# Patient Record
Sex: MALE | Race: WHITE | Employment: OTHER | URBAN - METROPOLITAN AREA
[De-identification: names, ages, dates, MRNs, and addresses within clinical notes are randomized per-mention and may not be internally consistent; named-entity substitution may affect disease eponyms.]

---

## 2017-01-11 DIAGNOSIS — E78.00 PURE HYPERCHOLESTEROLEMIA: ICD-10-CM

## 2017-01-11 RX ORDER — ATORVASTATIN CALCIUM 20 MG/1
20 TABLET, FILM COATED ORAL DAILY
Qty: 90 TABLET | Refills: 0 | Status: SHIPPED | OUTPATIENT
Start: 2017-01-11 | End: 2017-05-13 | Stop reason: SDUPTHER

## 2017-02-03 RX ORDER — NIZATIDINE 150 MG/1
150 CAPSULE ORAL NIGHTLY
Qty: 90 CAPSULE | Refills: 1 | Status: SHIPPED | OUTPATIENT
Start: 2017-02-03 | End: 2017-10-26 | Stop reason: SDUPTHER

## 2017-04-06 RX ORDER — CLOPIDOGREL BISULFATE 75 MG/1
75 TABLET ORAL DAILY
Qty: 90 TABLET | Refills: 1 | Status: SHIPPED | OUTPATIENT
Start: 2017-04-06 | End: 2017-11-28 | Stop reason: SDUPTHER

## 2017-05-13 DIAGNOSIS — E78.00 PURE HYPERCHOLESTEROLEMIA: ICD-10-CM

## 2017-05-15 RX ORDER — ATORVASTATIN CALCIUM 20 MG/1
20 TABLET, FILM COATED ORAL DAILY
Qty: 90 TABLET | Refills: 0 | Status: SHIPPED | OUTPATIENT
Start: 2017-05-15 | End: 2017-08-22 | Stop reason: SDUPTHER

## 2017-06-27 DIAGNOSIS — I10 UNSPECIFIED ESSENTIAL HYPERTENSION: ICD-10-CM

## 2017-06-27 RX ORDER — METOPROLOL SUCCINATE 50 MG/1
TABLET, EXTENDED RELEASE ORAL
Qty: 90 TABLET | Refills: 0 | Status: SHIPPED | OUTPATIENT
Start: 2017-06-27 | End: 2018-09-25 | Stop reason: SDUPTHER

## 2017-06-28 DIAGNOSIS — I10 UNSPECIFIED ESSENTIAL HYPERTENSION: ICD-10-CM

## 2017-06-28 RX ORDER — LISINOPRIL 10 MG/1
10 TABLET ORAL DAILY
Qty: 90 TABLET | Refills: 0 | Status: SHIPPED | OUTPATIENT
Start: 2017-06-28 | End: 2017-12-20 | Stop reason: SDUPTHER

## 2017-08-22 DIAGNOSIS — E78.00 PURE HYPERCHOLESTEROLEMIA: ICD-10-CM

## 2017-08-23 RX ORDER — ATORVASTATIN CALCIUM 20 MG/1
TABLET, FILM COATED ORAL
Qty: 90 TABLET | Refills: 0 | Status: SHIPPED | OUTPATIENT
Start: 2017-08-23 | End: 2018-01-10 | Stop reason: SDUPTHER

## 2017-09-26 ENCOUNTER — TELEPHONE (OUTPATIENT)
Dept: FAMILY MEDICINE CLINIC | Age: 67
End: 2017-09-26

## 2017-09-26 ENCOUNTER — OFFICE VISIT (OUTPATIENT)
Dept: FAMILY MEDICINE CLINIC | Age: 67
End: 2017-09-26

## 2017-09-26 VITALS
TEMPERATURE: 97.8 F | SYSTOLIC BLOOD PRESSURE: 125 MMHG | RESPIRATION RATE: 20 BRPM | OXYGEN SATURATION: 97 % | BODY MASS INDEX: 31.77 KG/M2 | HEIGHT: 68 IN | WEIGHT: 209.6 LBS | DIASTOLIC BLOOD PRESSURE: 66 MMHG | HEART RATE: 60 BPM

## 2017-09-26 DIAGNOSIS — M50.30 DEGENERATION OF CERVICAL INTERVERTEBRAL DISC: ICD-10-CM

## 2017-09-26 DIAGNOSIS — E78.00 PURE HYPERCHOLESTEROLEMIA: ICD-10-CM

## 2017-09-26 DIAGNOSIS — Z00.00 ROUTINE GENERAL MEDICAL EXAMINATION AT A HEALTH CARE FACILITY: Primary | ICD-10-CM

## 2017-09-26 DIAGNOSIS — K21.9 GASTROESOPHAGEAL REFLUX DISEASE WITHOUT ESOPHAGITIS: ICD-10-CM

## 2017-09-26 DIAGNOSIS — Z23 NEED FOR PNEUMOCOCCAL VACCINATION: ICD-10-CM

## 2017-09-26 DIAGNOSIS — R73.03 PREDIABETES: ICD-10-CM

## 2017-09-26 DIAGNOSIS — R97.20 ELEVATED PSA: ICD-10-CM

## 2017-09-26 DIAGNOSIS — S01.01XA SCALP LACERATION, INITIAL ENCOUNTER: ICD-10-CM

## 2017-09-26 DIAGNOSIS — I25.10 CORONARY ARTERY DISEASE INVOLVING NATIVE CORONARY ARTERY OF NATIVE HEART WITHOUT ANGINA PECTORIS: ICD-10-CM

## 2017-09-26 DIAGNOSIS — M48.02 SPINAL STENOSIS IN CERVICAL REGION: ICD-10-CM

## 2017-09-26 LAB
A/G RATIO: 1.9 (ref 1.1–2.2)
ALBUMIN SERPL-MCNC: 4.7 G/DL (ref 3.4–5)
ALP BLD-CCNC: 81 U/L (ref 40–129)
ALT SERPL-CCNC: 72 U/L (ref 10–40)
ANION GAP SERPL CALCULATED.3IONS-SCNC: 13 MMOL/L (ref 3–16)
AST SERPL-CCNC: 52 U/L (ref 15–37)
BILIRUB SERPL-MCNC: 1.1 MG/DL (ref 0–1)
BUN BLDV-MCNC: 16 MG/DL (ref 7–20)
CALCIUM SERPL-MCNC: 9.9 MG/DL (ref 8.3–10.6)
CHLORIDE BLD-SCNC: 98 MMOL/L (ref 99–110)
CHOLESTEROL, TOTAL: 202 MG/DL (ref 0–199)
CO2: 28 MMOL/L (ref 21–32)
CREAT SERPL-MCNC: 1 MG/DL (ref 0.8–1.3)
GFR AFRICAN AMERICAN: >60
GFR NON-AFRICAN AMERICAN: >60
GLOBULIN: 2.5 G/DL
GLUCOSE BLD-MCNC: 97 MG/DL (ref 70–99)
HDLC SERPL-MCNC: 42 MG/DL (ref 40–60)
LDL CHOLESTEROL CALCULATED: 135 MG/DL
POTASSIUM SERPL-SCNC: 4.6 MMOL/L (ref 3.5–5.1)
PROSTATE SPECIFIC ANTIGEN: 5.93 NG/ML (ref 0–4)
SODIUM BLD-SCNC: 139 MMOL/L (ref 136–145)
TOTAL PROTEIN: 7.2 G/DL (ref 6.4–8.2)
TRIGL SERPL-MCNC: 127 MG/DL (ref 0–150)
VLDLC SERPL CALC-MCNC: 25 MG/DL

## 2017-09-26 PROCEDURE — G0009 ADMIN PNEUMOCOCCAL VACCINE: HCPCS | Performed by: FAMILY MEDICINE

## 2017-09-26 PROCEDURE — 90670 PCV13 VACCINE IM: CPT | Performed by: FAMILY MEDICINE

## 2017-09-26 PROCEDURE — G0439 PPPS, SUBSEQ VISIT: HCPCS | Performed by: FAMILY MEDICINE

## 2017-09-26 PROCEDURE — 90714 TD VACC NO PRESV 7 YRS+ IM: CPT | Performed by: FAMILY MEDICINE

## 2017-09-26 PROCEDURE — 90471 IMMUNIZATION ADMIN: CPT | Performed by: FAMILY MEDICINE

## 2017-09-26 RX ORDER — SILDENAFIL CITRATE 20 MG/1
TABLET ORAL
Qty: 90 TABLET | Refills: 1 | Status: SHIPPED | OUTPATIENT
Start: 2017-09-26

## 2017-09-27 LAB
ESTIMATED AVERAGE GLUCOSE: 122.6 MG/DL
HBA1C MFR BLD: 5.9 %

## 2017-10-13 ENCOUNTER — TELEPHONE (OUTPATIENT)
Dept: FAMILY MEDICINE CLINIC | Age: 67
End: 2017-10-13

## 2017-10-13 NOTE — TELEPHONE ENCOUNTER
A  scanned  request for a  Prior Authorization for medication  Sildenafil  is attached to this encounter. Please initiate  this request with the patients insurance company.  Thank  You

## 2017-10-13 NOTE — TELEPHONE ENCOUNTER
PA submitted in completion via CoverMyMeds. Awaiting Payor response via office fax. Thank you! CoverMyMeds PA [place]: Maida Jensen is reviewing your PA request. Typically an electronic response will be received within 72 hours. To check for an update later, open this request from your dashboard.

## 2017-10-26 RX ORDER — NIZATIDINE 150 MG/1
CAPSULE ORAL
Qty: 90 CAPSULE | Refills: 1 | Status: SHIPPED | OUTPATIENT
Start: 2017-10-26 | End: 2017-11-08 | Stop reason: SDUPTHER

## 2017-11-08 RX ORDER — NIZATIDINE 150 MG/1
CAPSULE ORAL
Qty: 90 CAPSULE | Refills: 1 | Status: SHIPPED | OUTPATIENT
Start: 2017-11-08 | End: 2018-12-04 | Stop reason: ALTCHOICE

## 2017-11-08 NOTE — TELEPHONE ENCOUNTER
From: Yvette Mata  Sent: 11/7/2017 1:14 PM EST  Subject: Medication Renewal Request    Yvette Mata would like a refill of the following medications:  nizatidine (AXID) 150 MG capsule Bella Domingo MD]    Preferred pharmacy: [facility] , [address] [phone] - F [phone]    Comment:

## 2017-11-29 RX ORDER — CLOPIDOGREL BISULFATE 75 MG/1
TABLET ORAL
Qty: 90 TABLET | Refills: 1 | Status: SHIPPED | OUTPATIENT
Start: 2017-11-29 | End: 2018-06-26 | Stop reason: SDUPTHER

## 2017-12-01 ENCOUNTER — OFFICE VISIT (OUTPATIENT)
Dept: FAMILY MEDICINE CLINIC | Age: 67
End: 2017-12-01

## 2017-12-01 VITALS
OXYGEN SATURATION: 98 % | WEIGHT: 214 LBS | BODY MASS INDEX: 32.54 KG/M2 | TEMPERATURE: 98.2 F | DIASTOLIC BLOOD PRESSURE: 82 MMHG | HEART RATE: 61 BPM | SYSTOLIC BLOOD PRESSURE: 138 MMHG

## 2017-12-01 DIAGNOSIS — H65.01 RIGHT ACUTE SEROUS OTITIS MEDIA, RECURRENCE NOT SPECIFIED: ICD-10-CM

## 2017-12-01 DIAGNOSIS — I25.10 CORONARY ARTERY DISEASE INVOLVING NATIVE CORONARY ARTERY OF NATIVE HEART WITHOUT ANGINA PECTORIS: ICD-10-CM

## 2017-12-01 DIAGNOSIS — R97.20 ELEVATED PSA: ICD-10-CM

## 2017-12-01 DIAGNOSIS — J01.10 ACUTE FRONTAL SINUSITIS, RECURRENCE NOT SPECIFIED: Primary | ICD-10-CM

## 2017-12-01 PROCEDURE — 3017F COLORECTAL CA SCREEN DOC REV: CPT | Performed by: FAMILY MEDICINE

## 2017-12-01 PROCEDURE — G8598 ASA/ANTIPLAT THER USED: HCPCS | Performed by: FAMILY MEDICINE

## 2017-12-01 PROCEDURE — 99214 OFFICE O/P EST MOD 30 MIN: CPT | Performed by: FAMILY MEDICINE

## 2017-12-01 PROCEDURE — 1123F ACP DISCUSS/DSCN MKR DOCD: CPT | Performed by: FAMILY MEDICINE

## 2017-12-01 PROCEDURE — G8427 DOCREV CUR MEDS BY ELIG CLIN: HCPCS | Performed by: FAMILY MEDICINE

## 2017-12-01 PROCEDURE — 4040F PNEUMOC VAC/ADMIN/RCVD: CPT | Performed by: FAMILY MEDICINE

## 2017-12-01 PROCEDURE — G8484 FLU IMMUNIZE NO ADMIN: HCPCS | Performed by: FAMILY MEDICINE

## 2017-12-01 PROCEDURE — 1036F TOBACCO NON-USER: CPT | Performed by: FAMILY MEDICINE

## 2017-12-01 PROCEDURE — G8417 CALC BMI ABV UP PARAM F/U: HCPCS | Performed by: FAMILY MEDICINE

## 2017-12-01 RX ORDER — CEFUROXIME AXETIL 250 MG/1
250 TABLET ORAL 2 TIMES DAILY
COMMUNITY
End: 2018-12-04 | Stop reason: ALTCHOICE

## 2017-12-01 RX ORDER — CEFUROXIME AXETIL 500 MG/1
500 TABLET ORAL 2 TIMES DAILY
Qty: 14 TABLET | Refills: 0 | Status: SHIPPED | OUTPATIENT
Start: 2017-12-01 | End: 2017-12-08

## 2017-12-01 RX ORDER — PREDNISONE 20 MG/1
TABLET ORAL
Qty: 12 TABLET | Refills: 0 | Status: SHIPPED | OUTPATIENT
Start: 2017-12-01 | End: 2017-12-11

## 2017-12-01 NOTE — PROGRESS NOTES
cough.  Diagnoses and all orders for this visit:    Acute frontal sinusitis, recurrence not specified  -     Increase CefUROXime (CEFTIN) 500 MG tablet; Take 1 tablet by mouth 2 times daily for 7 days        -     Push fluids - water. Netti pot prn.         -     PredniSONE (DELTASONE) 20 MG tablet; 60 mg qd X 2d; 40 mg qd X 2d; 20 mg qd X 2d. Right acute serous otitis media, recurrence not specified        -    Restart Flonase NS 2 sprays each nostril qhs. Elevated PSA        -    Follow up with Urologist.   Coronary artery disease involving native coronary artery of native heart without angina pectoris        -    Stable. F/u if no improvement 7d/ prn increased symptoms.

## 2017-12-08 ENCOUNTER — OFFICE VISIT (OUTPATIENT)
Dept: FAMILY MEDICINE CLINIC | Age: 67
End: 2017-12-08

## 2017-12-08 VITALS
DIASTOLIC BLOOD PRESSURE: 80 MMHG | SYSTOLIC BLOOD PRESSURE: 134 MMHG | HEIGHT: 69 IN | HEART RATE: 61 BPM | BODY MASS INDEX: 30.75 KG/M2 | WEIGHT: 207.6 LBS | TEMPERATURE: 98.5 F | OXYGEN SATURATION: 97 %

## 2017-12-08 DIAGNOSIS — J01.10 ACUTE FRONTAL SINUSITIS, RECURRENCE NOT SPECIFIED: Primary | ICD-10-CM

## 2017-12-08 PROCEDURE — 99213 OFFICE O/P EST LOW 20 MIN: CPT | Performed by: FAMILY MEDICINE

## 2017-12-08 PROCEDURE — 4040F PNEUMOC VAC/ADMIN/RCVD: CPT | Performed by: FAMILY MEDICINE

## 2017-12-08 PROCEDURE — 1036F TOBACCO NON-USER: CPT | Performed by: FAMILY MEDICINE

## 2017-12-08 PROCEDURE — 1123F ACP DISCUSS/DSCN MKR DOCD: CPT | Performed by: FAMILY MEDICINE

## 2017-12-08 PROCEDURE — G8598 ASA/ANTIPLAT THER USED: HCPCS | Performed by: FAMILY MEDICINE

## 2017-12-08 PROCEDURE — G8417 CALC BMI ABV UP PARAM F/U: HCPCS | Performed by: FAMILY MEDICINE

## 2017-12-08 PROCEDURE — G8484 FLU IMMUNIZE NO ADMIN: HCPCS | Performed by: FAMILY MEDICINE

## 2017-12-08 PROCEDURE — G8427 DOCREV CUR MEDS BY ELIG CLIN: HCPCS | Performed by: FAMILY MEDICINE

## 2017-12-08 PROCEDURE — 3017F COLORECTAL CA SCREEN DOC REV: CPT | Performed by: FAMILY MEDICINE

## 2017-12-08 RX ORDER — LEVOFLOXACIN 500 MG/1
500 TABLET, FILM COATED ORAL DAILY
Qty: 10 TABLET | Refills: 0 | Status: SHIPPED | OUTPATIENT
Start: 2017-12-08 | End: 2019-11-12 | Stop reason: SDUPTHER

## 2017-12-08 RX ORDER — PROMETHAZINE HYDROCHLORIDE AND CODEINE PHOSPHATE 6.25; 1 MG/5ML; MG/5ML
5 SYRUP ORAL 4 TIMES DAILY PRN
Qty: 240 ML | Refills: 0 | Status: SHIPPED | OUTPATIENT
Start: 2017-12-08 | End: 2017-12-15

## 2017-12-08 ASSESSMENT — PATIENT HEALTH QUESTIONNAIRE - PHQ9
SUM OF ALL RESPONSES TO PHQ QUESTIONS 1-9: 0
SUM OF ALL RESPONSES TO PHQ9 QUESTIONS 1 & 2: 0
2. FEELING DOWN, DEPRESSED OR HOPELESS: 0
1. LITTLE INTEREST OR PLEASURE IN DOING THINGS: 0

## 2017-12-08 NOTE — PROGRESS NOTES
Patient is here for nasal congestion and post nasal drip X 2 -3 weeks. Nasal discharge is yellow and blood tinged. Cough is productive of yellow sputum and blood tinged. Sleeping okay with cough medication , which he is taking every 4-6 hours. No shortness of breath . Some wheezing. No fever . No fever reducer taken. No headaches. Right ear pain / pressure. Muffled hearing. He is off Prednisone and Ceftin as of yesterday. ROS: All other systems were reviewed and are negative . Patient's allergies and medications were reviewed. Patient's past medical, surgical, social , and family history were reviewed. OBJECTIVE:  /80   Pulse 61   Temp 98.5 °F (36.9 °C)   Ht 5' 9\" (1.753 m)   Wt 207 lb 9.6 oz (94.2 kg)   SpO2 97%   BMI 30.66 kg/m²   General: NAD, cooperative, alert and oriented X 3. Mood / affect is good. good insight. well hydrated. HEENT: PERRLA, EOMI, TMs - clear. Nasopharynx clear. Non tender. Neck : no lymphadenopathy, supple, FROM  CV: Regular rate and rhythm , no murmurs/ rub/ gallop. No edema. Lungs : CTA bilaterally, breathing comfortably  Abdomen: positive bowel sounds, soft , non tender, non distended. No hepatosplenomegaly. No CVA tenderness. Skin: no rashes. Non tender. ASSESSMENT/  PLAN:  Nikos Ortiz was seen today for sinusitis. Diagnoses and all orders for this visit:    Acute frontal sinusitis, recurrence not specified  -     Levofloxacin (LEVAQUIN) 500 MG tablet; Take 1 tablet by mouth daily for 10 days        -     Push fluids - water. Netti pot prn.   -     Promethazine-codeine (PHENERGAN WITH CODEINE) 6.25-10 MG/5ML syrup; Take 5 mLs by mouth 4 times daily as needed for Cough . Side effects of medication discussed including sedation and addiction potential.     F/u if no improvement 7-10d/ prn increased symptoms.

## 2017-12-20 DIAGNOSIS — I10 ESSENTIAL HYPERTENSION: ICD-10-CM

## 2017-12-21 RX ORDER — LISINOPRIL 10 MG/1
10 TABLET ORAL DAILY
Qty: 90 TABLET | Refills: 0 | Status: SHIPPED | OUTPATIENT
Start: 2017-12-21 | End: 2018-01-03 | Stop reason: SDUPTHER

## 2018-01-03 DIAGNOSIS — I10 ESSENTIAL HYPERTENSION: ICD-10-CM

## 2018-01-03 RX ORDER — LISINOPRIL 10 MG/1
10 TABLET ORAL DAILY
Qty: 90 TABLET | Refills: 0 | Status: SHIPPED | OUTPATIENT
Start: 2018-01-03 | End: 2018-08-07 | Stop reason: SDUPTHER

## 2018-01-03 NOTE — TELEPHONE ENCOUNTER
From: Gris Stoner  Sent: 1/1/2018 10:39 AM EST  Subject: Medication Renewal Request    Gris Stoner would like a refill of the following medications:  lisinopril (PRINIVIL;ZESTRIL) 10 MG tablet Darby Hernandez MD]    Preferred pharmacy: [facility] [place] [address] [phone] - F [phone]    Comment:      Medication renewals requested in this message routed separately:  atorvastatin (LIPITOR) 20 MG tablet Zeferino Hughes MD]

## 2018-01-10 DIAGNOSIS — E78.00 PURE HYPERCHOLESTEROLEMIA: ICD-10-CM

## 2018-01-10 RX ORDER — ATORVASTATIN CALCIUM 20 MG/1
TABLET, FILM COATED ORAL
Qty: 90 TABLET | Refills: 0 | Status: SHIPPED | OUTPATIENT
Start: 2018-01-10 | End: 2018-04-15 | Stop reason: SDUPTHER

## 2018-01-10 NOTE — TELEPHONE ENCOUNTER
From: Brice Montes  Sent: 1/10/2018 9:13 AM EST  Subject: Medication Renewal Request    Brice Montes would like a refill of the following medications:  atorvastatin (LIPITOR) 20 MG tablet Asa Kline MD]    Preferred pharmacy: [place] [address] [facility] [address] [phone] - F [phone]    Comment:

## 2018-02-12 ENCOUNTER — TELEPHONE (OUTPATIENT)
Dept: FAMILY MEDICINE CLINIC | Age: 68
End: 2018-02-12

## 2018-02-12 RX ORDER — NIZATIDINE 150 MG/1
CAPSULE ORAL
Qty: 90 CAPSULE | Refills: 1 | Status: CANCELLED | OUTPATIENT
Start: 2018-02-12

## 2018-02-12 RX ORDER — RANITIDINE 150 MG/1
150 TABLET ORAL 2 TIMES DAILY
Qty: 60 TABLET | Refills: 3 | Status: SHIPPED | OUTPATIENT
Start: 2018-02-12 | End: 2018-06-01 | Stop reason: SDUPTHER

## 2018-02-12 NOTE — TELEPHONE ENCOUNTER
From: Donnie Villaseñor  Sent: 2/12/2018 1:20 PM EST  Subject: Medication Renewal Request    Donnie Villaseñor would like a refill of the following medications:  nizatidine (AXID) 150 MG capsule Halle Durán MD]    Preferred pharmacy: [email or web address], [place] - [address] [phone] - F [phone]    Comment:  My insurance will not cover this prescription. They say it's not necessary. [employer] is forwarding a letter of authorization. I am going out of town for 3 weeks and need a refill. Thank you.

## 2018-02-12 NOTE — TELEPHONE ENCOUNTER
Office has been notified that pt is requiring Prior Authorization for the following medication:    NIZATIDINE    Please initiate this request through CoverMyMeds, contacting the following Payor/Insurance:    MEDICARE    Please see below, or the documentation attached to this encounter for any additional information that may assist in processing PA:    --     Thank you!

## 2018-04-15 DIAGNOSIS — E78.00 PURE HYPERCHOLESTEROLEMIA: ICD-10-CM

## 2018-04-16 RX ORDER — ATORVASTATIN CALCIUM 20 MG/1
TABLET, FILM COATED ORAL
Qty: 90 TABLET | Refills: 0 | Status: SHIPPED | OUTPATIENT
Start: 2018-04-16 | End: 2018-08-29 | Stop reason: SDUPTHER

## 2018-05-11 ENCOUNTER — OFFICE VISIT (OUTPATIENT)
Dept: ORTHOPEDIC SURGERY | Age: 68
End: 2018-05-11

## 2018-05-11 VITALS
HEIGHT: 69 IN | HEART RATE: 63 BPM | DIASTOLIC BLOOD PRESSURE: 73 MMHG | BODY MASS INDEX: 30.66 KG/M2 | WEIGHT: 207 LBS | SYSTOLIC BLOOD PRESSURE: 122 MMHG

## 2018-05-11 DIAGNOSIS — M79.672 LEFT FOOT PAIN: ICD-10-CM

## 2018-05-11 DIAGNOSIS — M72.2 PLANTAR FASCIITIS, BILATERAL: ICD-10-CM

## 2018-05-11 DIAGNOSIS — M79.671 RIGHT FOOT PAIN: Primary | ICD-10-CM

## 2018-05-11 PROCEDURE — G8427 DOCREV CUR MEDS BY ELIG CLIN: HCPCS | Performed by: ORTHOPAEDIC SURGERY

## 2018-05-11 PROCEDURE — 4040F PNEUMOC VAC/ADMIN/RCVD: CPT | Performed by: ORTHOPAEDIC SURGERY

## 2018-05-11 PROCEDURE — G8417 CALC BMI ABV UP PARAM F/U: HCPCS | Performed by: ORTHOPAEDIC SURGERY

## 2018-05-11 PROCEDURE — G8598 ASA/ANTIPLAT THER USED: HCPCS | Performed by: ORTHOPAEDIC SURGERY

## 2018-05-11 PROCEDURE — 1123F ACP DISCUSS/DSCN MKR DOCD: CPT | Performed by: ORTHOPAEDIC SURGERY

## 2018-05-11 PROCEDURE — 3017F COLORECTAL CA SCREEN DOC REV: CPT | Performed by: ORTHOPAEDIC SURGERY

## 2018-05-11 PROCEDURE — 1036F TOBACCO NON-USER: CPT | Performed by: ORTHOPAEDIC SURGERY

## 2018-05-11 PROCEDURE — 99214 OFFICE O/P EST MOD 30 MIN: CPT | Performed by: ORTHOPAEDIC SURGERY

## 2018-05-15 ENCOUNTER — HOSPITAL ENCOUNTER (OUTPATIENT)
Dept: PHYSICAL THERAPY | Age: 68
Discharge: HOME | End: 2018-05-31
Admitting: ORTHOPAEDIC SURGERY

## 2018-05-22 ENCOUNTER — HOSPITAL ENCOUNTER (OUTPATIENT)
Dept: PHYSICAL THERAPY | Age: 68
Discharge: HOME | End: 2018-05-23
Admitting: ORTHOPAEDIC SURGERY

## 2018-05-24 ENCOUNTER — HOSPITAL ENCOUNTER (OUTPATIENT)
Dept: PHYSICAL THERAPY | Age: 68
Discharge: HOME | End: 2018-05-25
Admitting: ORTHOPAEDIC SURGERY

## 2018-05-29 ENCOUNTER — HOSPITAL ENCOUNTER (OUTPATIENT)
Dept: PHYSICAL THERAPY | Age: 68
Discharge: HOME | End: 2018-05-30
Admitting: ORTHOPAEDIC SURGERY

## 2018-06-01 ENCOUNTER — HOSPITAL ENCOUNTER (OUTPATIENT)
Dept: PHYSICAL THERAPY | Age: 68
Discharge: HOME | End: 2018-06-30
Attending: ORTHOPAEDIC SURGERY | Admitting: ORTHOPAEDIC SURGERY

## 2018-06-01 RX ORDER — RANITIDINE 150 MG/1
TABLET ORAL
Qty: 180 TABLET | Refills: 1 | Status: SHIPPED | OUTPATIENT
Start: 2018-06-01 | End: 2020-05-08 | Stop reason: ALTCHOICE

## 2018-06-04 ENCOUNTER — HOSPITAL ENCOUNTER (OUTPATIENT)
Dept: PHYSICAL THERAPY | Age: 68
Discharge: HOME | End: 2018-06-05
Admitting: ORTHOPAEDIC SURGERY

## 2018-06-12 ENCOUNTER — HOSPITAL ENCOUNTER (OUTPATIENT)
Dept: PHYSICAL THERAPY | Age: 68
Discharge: HOME | End: 2018-06-13
Admitting: ORTHOPAEDIC SURGERY

## 2018-06-13 LAB — PROSTATE SPECIFIC ANTIGEN: 4.47 NG/ML (ref 0–4)

## 2018-06-26 ENCOUNTER — HOSPITAL ENCOUNTER (OUTPATIENT)
Dept: PHYSICAL THERAPY | Age: 68
Discharge: HOME | End: 2018-06-27
Admitting: ORTHOPAEDIC SURGERY

## 2018-06-26 RX ORDER — CLOPIDOGREL BISULFATE 75 MG/1
TABLET ORAL
Qty: 90 TABLET | Refills: 0 | Status: SHIPPED | OUTPATIENT
Start: 2018-06-26 | End: 2018-09-23 | Stop reason: SDUPTHER

## 2018-07-01 ENCOUNTER — HOSPITAL ENCOUNTER (OUTPATIENT)
Dept: PHYSICAL THERAPY | Age: 68
Discharge: HOME | End: 2018-07-01
Attending: ORTHOPAEDIC SURGERY | Admitting: ORTHOPAEDIC SURGERY

## 2018-07-03 ENCOUNTER — HOSPITAL ENCOUNTER (OUTPATIENT)
Dept: PHYSICAL THERAPY | Age: 68
Discharge: HOME | End: 2018-07-04
Admitting: ORTHOPAEDIC SURGERY

## 2018-07-03 NOTE — FLOWSHEET NOTE
[facility] Orthopaedics and Sports Rehabilitation, [facility]     Physical Therapy Daily Treatment Note  Date:  7/3/2018    Patient Name:  Nalini Bautista    :  1950  MRN: [id number]  Restrictions/Precautions:    Medical/Treatment Diagnosis Information:  · Diagnosis: M72.2 (ICD-10-CM) - Plantar fasciitis, bilateral  · Treatment Diagnosis: B foot pain, decreased TCJ mobility, HS/GS tightness  Insurance/Certification information:  PT Insurance Information: PT BENEFITS 2018 FACILITY/ MEDICARE PRIMARY/ PAYS 80%/ 2,010 CAP FOR 2018/ [employer] SECONDARY INS/ 18 PAG/ PW COMPLETE 5-15-18 PAG  Physician Information:  Referring Practitioner: Yuriy Blair MD  Plan of care signed (Y/N):     Date of Patient follow up with Physician:  Not scheduled at this time    G-Code (if applicable):      Date G-Code Applied:  5/15/18  PT G-Codes  Functional Assessment Tool Used: LEFS  Score: 65/80=81.25% (18.75% deficit)  18 score 65/80=81.25% (18.75% deficit)  Functional Limitation: Mobility: Walking and moving around  Mobility: Walking and Moving Around Current Status (): At least 1 percent but less than 20 percent impaired, limited or restricted  Mobility: Walking and Moving Around Goal Status (): 0 percent impaired, limited or restricted    Progress Note: [x]  Yes  []  No  Next due by: Visit #10       Latex Allergy:  [x]NO      []YES  Preferred Language for Healthcare:   [x]English       []other:    Visit # Insurance Allowable   7 MEDICARE     Pain level:  0/10     SUBJECTIVE: Pt states his feet have been feeling good. He did two 1/2 mile walks and two 2 mile walks, notes some tenderness but is not having pain, no symptoms that linger into the next day. Did a 10 mile bike ride, no discomfort with feet. If he has discomfort it is mostly when barefoot, but overall his symptoms are vastly improved and discomfort is only intermittent. Pt states that he does not feel confident to run yet.  \"Every day life I'm fine. \"    OBJECTIVE:            ROM PROM AROM Comments     Left Right Left Right     Dorsiflexion     0 G  1 S 0 G  2 S     Plantarflexion     50 65     Inversion     30 45     Eversion     13 23     Knee ext     0 0     Knee flex     130 128        Strength Left Right Comments   Dorsiflexion 4+/5 4+/5     Plantarflexion 4+/5 4+/5     Inversion 4+/5 4+/5     Eversion 4+/5 4+/5     Knee Flex 4+/5 4+/5     Knee Ext 4+/5 4+/5     Seated ER 4+/5 4+/5     Seated IR 4+/5 4+/5     Hip Ext 4+/5 4+/5     Hip ABD 4+/5 4+/5        Flexibility Left Right Comments   Ely's - -     Gerard - -     Hector - -     SLR-HS 50 deg 55 deg        Reflexes/Sensation: Not assessed               []Dermatomes/Myotomes intact               []Reflexes equal and normal bilaterally              []Other:     Joint mobility:               [x]Normal, metatarsal joints                      [x]Hypo, B talocrural joint              []Hyper     Palpation: +TTP B plantarfascia      Functional Mobility/Transfers: Independent     Posture: No major deviations noted     Balance:   SLS EO: 26\" L, 60\" R   SLS EC: 5\" on L, 4\" on R      Gait: Charlotte legged. RLE in ER.                        RESTRICTIONS/PRECAUTIONS: None    Exercises/Interventions:   Exercise/Equipment Resistance/Repetitions Other comments   Soft Tissue Massage/  Mobilizations     Proximal calf  Tennis ball behind knee, flex knee, DF/PF ankle   Calf Roll with tennis ball   TCJ mobs Mini squats with toes elevated  on incline board   Plantarfascia  Tennis ball under foot             Stretching     Hamstring Supine with strap   Supine CBA With strap   Stair stretch- gastroc    Stair stretch- soleus    Great toe extension Great toe against wall        PRE's     Dorsiflexion     Plantarflexion     Inversion     Eversion     SLR     Calf Raises     Step Up     Knee Extension     Hamstring Curls     Leg Press     Doming 10x10\" B    Toe Curls 3x50 B    Rock  x2 B    Clamshells Blue loop without increased symptoms or restriction. Progressing  5. Patient will tolerate progressive return to running through the return to run program.     Progressing     Progression Towards Functional goals:  [] Patient is progressing as expected towards functional goals listed. [] Progression is slowed due to complexities listed. [] Progression has been slowed due to co-morbidities. [x] Plan just implemented, too soon to assess goals progression  [] Other:     ASSESSMENT:  Continues to be challenged by SLS. Good tolerance to addition of squats with band around feet to promote improved foot intrinsic activation. Pt educated on return to run program but at reduced intensity, 4 cycles instead of 5. Will determine PT progressions based on running tolerance, alternative interventions such as dry needling or cortisone injection. Pt requires PT follow up to address ROM, strength and functional mobility deficits. Treatment/Activity Tolerance:  [x] Patient tolerated treatment well [] Patient limited by fatique  [] Patient limited by pain  [] Patient limited by other medical complications  [] Other:     Prognosis: [x] Good [] Fair  [] Poor    Patient Requires Follow-up: [x] Yes  [] No    PLAN: See eval  [x] Continue per plan of care [] Alter current plan (see comments)  [] Plan of care initiated [] Hold pending MD visit [] Discharge    Electronically signed by: Sandra Swanson PT, DPT  Physical Therapist  [id number]  [email or web address]

## 2018-07-13 ENCOUNTER — HOSPITAL ENCOUNTER (OUTPATIENT)
Dept: PHYSICAL THERAPY | Age: 68
Discharge: HOME | End: 2018-07-14
Admitting: ORTHOPAEDIC SURGERY

## 2018-07-13 NOTE — FLOWSHEET NOTE
Balance:     Rocker Board 2x30\" DL perform A/P and M/L   BOSU     SLS   EC 3x20\"    Aeromat     Foam Roll     Plyoback     Tandem Stance 3x10 4# ball toss, B Airex   Around the world  Cones at AL, A, AM, M, PM, P        Bike     Treadmill                 Plan for next session: progress as tolerated    Patient Education:  5/15/18 Pt educated on diagnosis, prognosis and PT plan of care. Educated on [address]. Pt questions were addressed and answered. Therapeutic Exercise and NMR EXR:  [x] (34144) Provided verbal/tactile cueing for activities related to strengthening, flexibility, endurance, ROM for improvements in LE, proximal hip, and core control with self care, mobility, lifting, ambulation.  [] (39975) Provided verbal/tactile cueing for activities related to improving balance, coordination, kinesthetic sense, posture, motor skill, proprioception  to assist with LE, proximal hip, and core control in self care, mobility, lifting, ambulation and eccentric single leg control.      NMR and Therapeutic Activities:    [] (13861 or 88871) Provided verbal/tactile cueing for activities related to improving balance, coordination, kinesthetic sense, posture, motor skill, proprioception and motor activation to allow for proper function of core, proximal hip and LE with self care and ADLs  [] (94397) Gait Re-education- Provided training and instruction to the patient for proper LE, core and proximal hip recruitment and positioning and eccentric body weight control with ambulation re-education including up and down stairs     Home Exercise Program:   5/22/18 Added SLS, tandem balance, toe curls, doming   [x] (08008) Reviewed/Progressed HEP activities related to strengthening, flexibility, endurance, ROM of core, proximal hip and LE for functional self-care, mobility, lifting and ambulation/stair navigation   [] (13434)Reviewed/Progressed HEP activities related to improving balance, coordination, kinesthetic sense, posture, motor

## 2018-07-16 ENCOUNTER — HOSPITAL ENCOUNTER (OUTPATIENT)
Dept: PHYSICAL THERAPY | Age: 68
Setting detail: THERAPIES SERIES
Discharge: HOME | End: 2018-07-16
Payer: MEDICARE

## 2018-07-16 PROCEDURE — 97110 THERAPEUTIC EXERCISES: CPT | Performed by: PHYSICAL THERAPIST

## 2018-07-16 PROCEDURE — 97112 NEUROMUSCULAR REEDUCATION: CPT | Performed by: PHYSICAL THERAPIST

## 2018-07-16 PROCEDURE — 97140 MANUAL THERAPY 1/> REGIONS: CPT | Performed by: PHYSICAL THERAPIST

## 2018-07-16 NOTE — FLOWSHEET NOTE
[facility]     Physical Therapy Daily Treatment Note  Date:  2018    Patient Name:  Kenda Siemens    :  1950  MRN: [id number]  Restrictions/Precautions:    Medical/Treatment Diagnosis Information:  · Diagnosis: M72.2 (ICD-10-CM) - Plantar fasciitis, bilateral  · Treatment Diagnosis: B foot pain, decreased TCJ mobility, HS/GS tightness  Insurance/Certification information:  PT Insurance Information: PT BENEFITS 2018 FACILITY/ MEDICARE PRIMARY/ PAYS 80%/ 2,010 CAP FOR 2018/ [employer] SECONDARY INS/ 18 PAG/ PW COMPLETE 5-15-18 PAG  Physician Information:  Referring Practitioner: Wan Freire MD  Plan of care signed (Y/N):     Date of Patient follow up with Physician:  Not scheduled at this time    G-Code (if applicable):      Date G-Code Applied:  5/15/18  PT G-Codes  Functional Assessment Tool Used: LEFS  Score: 65/80=81.25% (18.75% deficit)  18 score 65/80=81.25% (18.75% deficit)  Functional Limitation: Mobility: Walking and moving around  Mobility: Walking and Moving Around Current Status (): At least 1 percent but less than 20 percent impaired, limited or restricted  Mobility: Walking and Moving Around Goal Status (): 0 percent impaired, limited or restricted    Progress Note: [x]  Yes  []  No  Next due by: Visit #10       Latex Allergy:  [x]NO      []YES  Preferred Language for Healthcare:   [x]English       []other:    Visit # Insurance Allowable   9 MEDICARE     Pain level:  0/10     SUBJECTIVE: Pt states that he is doing well with continued decreased pain. Is now performing the 2nd stage of return to run program without pain and only notes minimal aching t/o that dissipates with rest.  Reports compliance with HEP without any c/o pain or discomfort. Denies any pain after this visit. Is excited to be trying dry needling this date.      OBJECTIVE:            ROM PROM AROM Comments     Left Right Left Right   band around middle of feet             Balance:     Rocker Board 2x30\" DL perform A/P and M/L   BOSU     SLS   EC 3x20\"    Aeromat     Foam Roll     Plyoback     Tandem Stance 3x10 4# ball toss, B Airex   Around the world  Cones at AL, A, AM, M, PM, P        Bike     Treadmill                 Plan for next session: progress as tolerated    Patient Education:  5/15/18 Pt educated on diagnosis, prognosis and PT plan of care. Educated on [address]. Pt questions were addressed and answered. Therapeutic Exercise and NMR EXR:  [x] (01743) Provided verbal/tactile cueing for activities related to strengthening, flexibility, endurance, ROM for improvements in LE, proximal hip, and core control with self care, mobility, lifting, ambulation.  [] (81172) Provided verbal/tactile cueing for activities related to improving balance, coordination, kinesthetic sense, posture, motor skill, proprioception  to assist with LE, proximal hip, and core control in self care, mobility, lifting, ambulation and eccentric single leg control.      NMR and Therapeutic Activities:    [] (52213 or 06143) Provided verbal/tactile cueing for activities related to improving balance, coordination, kinesthetic sense, posture, motor skill, proprioception and motor activation to allow for proper function of core, proximal hip and LE with self care and ADLs  [] (20238) Gait Re-education- Provided training and instruction to the patient for proper LE, core and proximal hip recruitment and positioning and eccentric body weight control with ambulation re-education including up and down stairs     Home Exercise Program:   5/22/18 Added SLS, tandem balance, toe curls, doming   [x] (20965) Reviewed/Progressed HEP activities related to strengthening, flexibility, endurance, ROM of core, proximal hip and LE for functional self-care, mobility, lifting and ambulation/stair navigation   [] (62937)Reviewed/Progressed HEP activities related to improving balance,

## 2018-07-27 ENCOUNTER — APPOINTMENT (OUTPATIENT)
Dept: PHYSICAL THERAPY | Age: 68
End: 2018-07-27
Payer: MEDICARE

## 2018-08-07 DIAGNOSIS — I10 ESSENTIAL HYPERTENSION: ICD-10-CM

## 2018-08-07 RX ORDER — LISINOPRIL 10 MG/1
10 TABLET ORAL DAILY
Qty: 90 TABLET | Refills: 0 | Status: SHIPPED | OUTPATIENT
Start: 2018-08-07 | End: 2018-12-17 | Stop reason: SDUPTHER

## 2018-08-09 RX ORDER — RANITIDINE 150 MG/1
TABLET ORAL
Qty: 60 TABLET | Refills: 5 | Status: SHIPPED | OUTPATIENT
Start: 2018-08-09 | End: 2018-12-04 | Stop reason: SDUPTHER

## 2018-08-10 ENCOUNTER — HOSPITAL ENCOUNTER (OUTPATIENT)
Dept: PHYSICAL THERAPY | Age: 68
Setting detail: THERAPIES SERIES
Discharge: HOME | End: 2018-08-10
Payer: MEDICARE

## 2018-08-10 PROCEDURE — 97110 THERAPEUTIC EXERCISES: CPT | Performed by: PHYSICAL THERAPIST

## 2018-08-10 PROCEDURE — 97112 NEUROMUSCULAR REEDUCATION: CPT | Performed by: PHYSICAL THERAPIST

## 2018-08-10 PROCEDURE — 97140 MANUAL THERAPY 1/> REGIONS: CPT | Performed by: PHYSICAL THERAPIST

## 2018-08-10 NOTE — FLOWSHEET NOTE
Modalities:  None    Charges:  Timed Code Treatment Minutes: 38   Total Treatment Minutes: 70     [] EVAL  [x] WN(84450) x  1   [] IONTO  [x] NMR (61848) x  1   [] VASO  [x] Manual (75046) x  1    [] Other:  [] TA x       [] Mech Traction (14304)  [] ES(attended) (32689)      [] ES (un) (15761):     GOALS:   Patient stated goal: Return to running     Therapist goals for Patient:   Short Term Goals: To be achieved in: 2 weeks 5/29/18  1. Independent in HEP and progression per patient tolerance, in order to prevent re-injury. MET  2. Patient will have a decrease in pain to facilitate improvement in movement, function, and ADLs as indicated by Functional Deficits. MET     Long Term Goals: To be achieved in: 6 weeks 6/26/18  1. Disability index score of 10% or less for the LEFS to assist with reaching prior level of function. Progressing  2. Patient will demonstrate increased AROM to 5-10 deg or greater for B G/S and HS to allow for proper joint functioning as indicated by patients Functional Deficits. Progressing   3. Patient will demonstrate an increase in SLS tolerance, able to maintain balance for 30\" or greater with EO and 20\" or greater with EC for improved stability and control. Progressing  4. Patient will return to ADLs and IADLs and functional activities without increased symptoms or restriction. Progressing  5. Patient will tolerate progressive return to running through the return to run program.     Progressing     Progression Towards Functional goals:  [] Patient is progressing as expected towards functional goals listed. [] Progression is slowed due to complexities listed. [] Progression has been slowed due to co-morbidities. [x] Plan just implemented, too soon to assess goals progression  [] Other:     ASSESSMENT:     Pt tolerated dry needling well with minimal to no local muscle twitches appreciated. DN is no longer indicated at this time.       Pt demonstrates improved B ankle ROM, even though deficits persist pt's mobility is improved compared to at IE. Pt also demonstrates improved SL and DL balance, though is still challenged achieving 30\" with EC. Pt has been able to work independently over the last month without exacerbation of symptoms and continued progression of RTR program. As a result, pt demonstrates that he no longer requires skilled PT at this time and rather continued compliance with HEP for independent strength and ROM improvement. DN is no longer indicated at this time d/t lack of trigger points noted, further indicating positive response and lack of need for skilled PT. Pt will be DC'd from care and f/u should issues arise, pt must see MD prior to returning to PT. Pt in agreement with plan. All questions and concerns were addressed. Treatment/Activity Tolerance:  [x] Patient tolerated treatment well [] Patient limited by fatique  [] Patient limited by pain  [] Patient limited by other medical complications  [] Other:     Prognosis: [x] Good [] Fair  [] Poor    Patient Requires Follow-up: [x] Yes  [] No    PLAN: See eval  [x] Continue per plan of care [] Alter current plan (see comments)  [] Plan of care initiated [] Hold pending MD visit [] Discharge    Electronically signed by: Rosalind Camarillo PT, DPT  Physical Therapist  [id number]  [email or web address]        Ban Lomas. Janusz Brown DPT, [id number]  Physical Therapist  [email or web address]

## 2018-08-29 DIAGNOSIS — E78.00 PURE HYPERCHOLESTEROLEMIA: ICD-10-CM

## 2018-08-30 RX ORDER — ATORVASTATIN CALCIUM 20 MG/1
TABLET, FILM COATED ORAL
Qty: 90 TABLET | Refills: 0 | Status: SHIPPED | OUTPATIENT
Start: 2018-08-30 | End: 2018-12-17 | Stop reason: SDUPTHER

## 2018-09-24 RX ORDER — CLOPIDOGREL BISULFATE 75 MG/1
TABLET ORAL
Qty: 90 TABLET | Refills: 0 | Status: SHIPPED | OUTPATIENT
Start: 2018-09-24 | End: 2018-12-04 | Stop reason: SDUPTHER

## 2018-09-25 DIAGNOSIS — I25.10 CORONARY ARTERY DISEASE INVOLVING NATIVE CORONARY ARTERY OF NATIVE HEART WITHOUT ANGINA PECTORIS: Primary | ICD-10-CM

## 2018-09-25 DIAGNOSIS — I10 ESSENTIAL HYPERTENSION: ICD-10-CM

## 2018-09-26 RX ORDER — METOPROLOL SUCCINATE 50 MG/1
50 TABLET, EXTENDED RELEASE ORAL DAILY
Qty: 90 TABLET | Refills: 0 | Status: SHIPPED | OUTPATIENT
Start: 2018-09-26 | End: 2021-03-21 | Stop reason: DRUGHIGH

## 2018-09-26 RX ORDER — CLOPIDOGREL BISULFATE 75 MG/1
75 TABLET ORAL DAILY
Qty: 90 TABLET | Refills: 0 | Status: SHIPPED | OUTPATIENT
Start: 2018-09-26 | End: 2018-12-03 | Stop reason: SDUPTHER

## 2018-12-03 DIAGNOSIS — I25.10 CORONARY ARTERY DISEASE INVOLVING NATIVE CORONARY ARTERY OF NATIVE HEART WITHOUT ANGINA PECTORIS: ICD-10-CM

## 2018-12-03 RX ORDER — CLOPIDOGREL BISULFATE 75 MG/1
75 TABLET ORAL DAILY
Qty: 90 TABLET | Refills: 1 | Status: SHIPPED | OUTPATIENT
Start: 2018-12-03 | End: 2019-01-29 | Stop reason: SDUPTHER

## 2018-12-04 ENCOUNTER — OFFICE VISIT (OUTPATIENT)
Dept: FAMILY MEDICINE CLINIC | Age: 68
End: 2018-12-04
Payer: MEDICARE

## 2018-12-04 VITALS
DIASTOLIC BLOOD PRESSURE: 86 MMHG | RESPIRATION RATE: 12 BRPM | TEMPERATURE: 97 F | BODY MASS INDEX: 31.64 KG/M2 | OXYGEN SATURATION: 97 % | HEART RATE: 57 BPM | HEIGHT: 69 IN | WEIGHT: 213.6 LBS | SYSTOLIC BLOOD PRESSURE: 144 MMHG

## 2018-12-04 DIAGNOSIS — G47.33 OBSTRUCTIVE SLEEP APNEA: ICD-10-CM

## 2018-12-04 DIAGNOSIS — I25.10 CORONARY ARTERY DISEASE INVOLVING NATIVE CORONARY ARTERY OF NATIVE HEART WITHOUT ANGINA PECTORIS: ICD-10-CM

## 2018-12-04 DIAGNOSIS — Z23 NEED FOR SHINGLES VACCINE: ICD-10-CM

## 2018-12-04 DIAGNOSIS — M48.02 SPINAL STENOSIS IN CERVICAL REGION: ICD-10-CM

## 2018-12-04 DIAGNOSIS — R21 RASH: ICD-10-CM

## 2018-12-04 DIAGNOSIS — R73.03 PREDIABETES: ICD-10-CM

## 2018-12-04 DIAGNOSIS — E78.00 PURE HYPERCHOLESTEROLEMIA: ICD-10-CM

## 2018-12-04 DIAGNOSIS — M50.30 DEGENERATION OF CERVICAL INTERVERTEBRAL DISC: ICD-10-CM

## 2018-12-04 DIAGNOSIS — R97.20 ELEVATED PSA: ICD-10-CM

## 2018-12-04 DIAGNOSIS — M19.072 OSTEOARTHRITIS OF LEFT FOOT, UNSPECIFIED OSTEOARTHRITIS TYPE: ICD-10-CM

## 2018-12-04 DIAGNOSIS — Z00.00 ROUTINE GENERAL MEDICAL EXAMINATION AT A HEALTH CARE FACILITY: Primary | ICD-10-CM

## 2018-12-04 DIAGNOSIS — H91.93 HEARING DIFFICULTY OF BOTH EARS: ICD-10-CM

## 2018-12-04 DIAGNOSIS — Z23 NEEDS FLU SHOT: ICD-10-CM

## 2018-12-04 LAB
A/G RATIO: 1.9 (ref 1.1–2.2)
ALBUMIN SERPL-MCNC: 4.9 G/DL (ref 3.4–5)
ALP BLD-CCNC: 83 U/L (ref 40–129)
ALT SERPL-CCNC: 77 U/L (ref 10–40)
ANION GAP SERPL CALCULATED.3IONS-SCNC: 12 MMOL/L (ref 3–16)
AST SERPL-CCNC: 56 U/L (ref 15–37)
BILIRUB SERPL-MCNC: 0.8 MG/DL (ref 0–1)
BUN BLDV-MCNC: 19 MG/DL (ref 7–20)
CALCIUM SERPL-MCNC: 10.1 MG/DL (ref 8.3–10.6)
CHLORIDE BLD-SCNC: 101 MMOL/L (ref 99–110)
CHOLESTEROL, TOTAL: 210 MG/DL (ref 0–199)
CO2: 28 MMOL/L (ref 21–32)
CREAT SERPL-MCNC: 0.9 MG/DL (ref 0.8–1.3)
GFR AFRICAN AMERICAN: >60
GFR NON-AFRICAN AMERICAN: >60
GLOBULIN: 2.6 G/DL
GLUCOSE BLD-MCNC: 101 MG/DL (ref 70–99)
HDLC SERPL-MCNC: 42 MG/DL (ref 40–60)
LDL CHOLESTEROL CALCULATED: 147 MG/DL
POTASSIUM SERPL-SCNC: 4.7 MMOL/L (ref 3.5–5.1)
PROSTATE SPECIFIC ANTIGEN: 5.39 NG/ML (ref 0–4)
SODIUM BLD-SCNC: 141 MMOL/L (ref 136–145)
TOTAL PROTEIN: 7.5 G/DL (ref 6.4–8.2)
TRIGL SERPL-MCNC: 104 MG/DL (ref 0–150)
VLDLC SERPL CALC-MCNC: 21 MG/DL

## 2018-12-04 PROCEDURE — 90662 IIV NO PRSV INCREASED AG IM: CPT | Performed by: FAMILY MEDICINE

## 2018-12-04 PROCEDURE — 90732 PPSV23 VACC 2 YRS+ SUBQ/IM: CPT | Performed by: FAMILY MEDICINE

## 2018-12-04 PROCEDURE — 92551 PURE TONE HEARING TEST AIR: CPT | Performed by: FAMILY MEDICINE

## 2018-12-04 PROCEDURE — G0439 PPPS, SUBSEQ VISIT: HCPCS | Performed by: FAMILY MEDICINE

## 2018-12-04 PROCEDURE — G0009 ADMIN PNEUMOCOCCAL VACCINE: HCPCS | Performed by: FAMILY MEDICINE

## 2018-12-04 PROCEDURE — G8482 FLU IMMUNIZE ORDER/ADMIN: HCPCS | Performed by: FAMILY MEDICINE

## 2018-12-04 PROCEDURE — G0008 ADMIN INFLUENZA VIRUS VAC: HCPCS | Performed by: FAMILY MEDICINE

## 2018-12-04 RX ORDER — CLOTRIMAZOLE AND BETAMETHASONE DIPROPIONATE 10; .64 MG/G; MG/G
CREAM TOPICAL
Qty: 45 G | Refills: 1 | Status: SHIPPED | OUTPATIENT
Start: 2018-12-04 | End: 2020-03-31

## 2018-12-04 RX ORDER — ATORVASTATIN CALCIUM 40 MG/1
40 TABLET, FILM COATED ORAL DAILY
Qty: 90 TABLET | Refills: 1 | Status: SHIPPED | OUTPATIENT
Start: 2018-12-04 | End: 2019-06-24

## 2018-12-04 ASSESSMENT — PATIENT HEALTH QUESTIONNAIRE - PHQ9
SUM OF ALL RESPONSES TO PHQ QUESTIONS 1-9: 0
1. LITTLE INTEREST OR PLEASURE IN DOING THINGS: 0
2. FEELING DOWN, DEPRESSED OR HOPELESS: 0
SUM OF ALL RESPONSES TO PHQ QUESTIONS 1-9: 0
SUM OF ALL RESPONSES TO PHQ9 QUESTIONS 1 & 2: 0

## 2018-12-04 NOTE — PROGRESS NOTES
facility-administered medications for this visit. Allergies   Allergen Reactions    Amoxicillin Hives and Swelling       Social History   Substance Use Topics    Smoking status: Never Smoker    Smokeless tobacco: Never Used    Alcohol use 1.8 - 3.6 oz/week     3 - 6 Cans of beer per week        Family History   Problem Relation Age of Onset    Heart Disease Mother     Heart Disease Father         CAD    Diabetes Father     High Blood Pressure Father     Hypertension Father           Patient's allergies and medications were reviewed. Patient's past medical, surgical, social , and family history were reviewed. ROS:  Feeling well. No dyspnea or chest pain on exertion. No abdominal pain, change in bowel habits, black or bloody stools. No urinary tract or prostatic symptoms. No neurological complaints. See patient physical/  ROS questionnaire. OBJECTIVE:   BP (!) 144/86   Pulse 57   Temp 97 °F (36.1 °C) (Oral)   Resp 12   Ht 5' 9.25\" (1.759 m)   Wt 213 lb 9.6 oz (96.9 kg)   SpO2 97%   BMI 31.32 kg/m²   There were no vitals filed for this visit. The patient appears well, alert, oriented x 3, in no distress. HEENT : normocephalic, atraumatic, PERRLA, EOMI, tympanic membranes and nasopharynx are normal.  Neck supple. No adenopathy or thyromegaly. Upper extremities : DTRs 2+ biceps/ triceps/ brachioradialis bilateral.  FROM. Strength 5/5. Lungs are clear, good air entry, no wheezes, rhonchi or rales. Cardiovascular: regular rate  And rhythm. S1 and S2 are normal, no murmurs,rubs, and gallops. Abdomen is soft without tenderness, guarding, mass or organomegaly. Normal bowel sounds. Lower extremities : DTRs 2+ knees and ankles bilateral.  FROM. Strength 5/5. Negative straight leg-raise. No edema or erythema bilateral. normal peripheral pulses. Neuro: Cranial nerves 2-12 are normal. Deep tendon reflexes are 2+ and equal to all extremities.   No focal sensory, or motor deficit

## 2018-12-05 LAB
ESTIMATED AVERAGE GLUCOSE: 131.2 MG/DL
HBA1C MFR BLD: 6.2 %

## 2018-12-17 DIAGNOSIS — E78.00 PURE HYPERCHOLESTEROLEMIA: ICD-10-CM

## 2018-12-17 DIAGNOSIS — I10 ESSENTIAL HYPERTENSION: ICD-10-CM

## 2018-12-17 RX ORDER — LISINOPRIL 10 MG/1
10 TABLET ORAL DAILY
Qty: 90 TABLET | Refills: 1 | Status: SHIPPED | OUTPATIENT
Start: 2018-12-17 | End: 2019-08-13 | Stop reason: SDUPTHER

## 2018-12-17 RX ORDER — ATORVASTATIN CALCIUM 20 MG/1
TABLET, FILM COATED ORAL
Qty: 90 TABLET | Refills: 1 | Status: SHIPPED | OUTPATIENT
Start: 2018-12-17 | End: 2019-08-30 | Stop reason: SDUPTHER

## 2018-12-18 DIAGNOSIS — E78.00 PURE HYPERCHOLESTEROLEMIA: ICD-10-CM

## 2018-12-18 DIAGNOSIS — I10 ESSENTIAL HYPERTENSION: ICD-10-CM

## 2018-12-18 RX ORDER — LISINOPRIL 10 MG/1
10 TABLET ORAL DAILY
Qty: 90 TABLET | Refills: 0 | Status: SHIPPED | OUTPATIENT
Start: 2018-12-18 | End: 2019-06-04 | Stop reason: SDUPTHER

## 2018-12-18 RX ORDER — ATORVASTATIN CALCIUM 20 MG/1
20 TABLET, FILM COATED ORAL DAILY
Qty: 90 TABLET | Refills: 0 | Status: SHIPPED | OUTPATIENT
Start: 2018-12-18 | End: 2019-06-24 | Stop reason: SDUPTHER

## 2018-12-26 RX ORDER — CLOPIDOGREL BISULFATE 75 MG/1
TABLET ORAL
Qty: 90 TABLET | Refills: 3 | Status: SHIPPED | OUTPATIENT
Start: 2018-12-26 | End: 2020-01-23 | Stop reason: SDUPTHER

## 2019-01-29 DIAGNOSIS — I25.10 CORONARY ARTERY DISEASE INVOLVING NATIVE CORONARY ARTERY OF NATIVE HEART WITHOUT ANGINA PECTORIS: ICD-10-CM

## 2019-01-29 RX ORDER — CLOPIDOGREL BISULFATE 75 MG/1
75 TABLET ORAL DAILY
Qty: 90 TABLET | Refills: 1 | Status: SHIPPED | OUTPATIENT
Start: 2019-01-29 | End: 2019-06-24 | Stop reason: SDUPTHER

## 2019-04-08 RX ORDER — RANITIDINE 150 MG/1
TABLET ORAL
Qty: 60 TABLET | Refills: 2 | Status: SHIPPED | OUTPATIENT
Start: 2019-04-08 | End: 2019-06-04 | Stop reason: SDUPTHER

## 2019-04-29 ENCOUNTER — HOSPITAL ENCOUNTER (OUTPATIENT)
Age: 69
Discharge: HOME | End: 2019-04-29
Payer: MEDICARE

## 2019-04-29 ENCOUNTER — OFFICE VISIT (OUTPATIENT)
Dept: FAMILY MEDICINE CLINIC | Age: 69
End: 2019-04-29
Payer: MEDICARE

## 2019-04-29 ENCOUNTER — HOSPITAL ENCOUNTER (OUTPATIENT)
Dept: GENERAL RADIOLOGY | Age: 69
Discharge: HOME | End: 2019-04-29
Payer: MEDICARE

## 2019-04-29 VITALS
HEART RATE: 52 BPM | OXYGEN SATURATION: 100 % | SYSTOLIC BLOOD PRESSURE: 132 MMHG | BODY MASS INDEX: 32.2 KG/M2 | RESPIRATION RATE: 20 BRPM | TEMPERATURE: 98.2 F | WEIGHT: 219.6 LBS | DIASTOLIC BLOOD PRESSURE: 84 MMHG

## 2019-04-29 DIAGNOSIS — I10 ESSENTIAL HYPERTENSION: ICD-10-CM

## 2019-04-29 DIAGNOSIS — R73.03 PREDIABETES: ICD-10-CM

## 2019-04-29 DIAGNOSIS — E78.00 PURE HYPERCHOLESTEROLEMIA: ICD-10-CM

## 2019-04-29 DIAGNOSIS — R97.20 ELEVATED PSA: ICD-10-CM

## 2019-04-29 DIAGNOSIS — M54.50 BILATERAL LOW BACK PAIN WITHOUT SCIATICA, UNSPECIFIED CHRONICITY: ICD-10-CM

## 2019-04-29 DIAGNOSIS — M70.61 TROCHANTERIC BURSITIS OF RIGHT HIP: ICD-10-CM

## 2019-04-29 DIAGNOSIS — G57.03 PIRIFORMIS SYNDROME OF BOTH SIDES: ICD-10-CM

## 2019-04-29 DIAGNOSIS — M54.50 BILATERAL LOW BACK PAIN WITHOUT SCIATICA, UNSPECIFIED CHRONICITY: Primary | ICD-10-CM

## 2019-04-29 LAB
A/G RATIO: 2.1 (ref 1.1–2.2)
ALBUMIN SERPL-MCNC: 4.6 G/DL (ref 3.4–5)
ALP BLD-CCNC: 85 U/L (ref 40–129)
ALT SERPL-CCNC: 79 U/L (ref 10–40)
ANION GAP SERPL CALCULATED.3IONS-SCNC: 11 MMOL/L (ref 3–16)
AST SERPL-CCNC: 39 U/L (ref 15–37)
BILIRUB SERPL-MCNC: 0.5 MG/DL (ref 0–1)
BUN BLDV-MCNC: 16 MG/DL (ref 7–20)
CALCIUM SERPL-MCNC: 9.7 MG/DL (ref 8.3–10.6)
CHLORIDE BLD-SCNC: 102 MMOL/L (ref 99–110)
CHOLESTEROL, TOTAL: 158 MG/DL (ref 0–199)
CO2: 28 MMOL/L (ref 21–32)
CREAT SERPL-MCNC: 1.1 MG/DL (ref 0.8–1.3)
GFR AFRICAN AMERICAN: >60
GFR NON-AFRICAN AMERICAN: >60
GLOBULIN: 2.2 G/DL
GLUCOSE BLD-MCNC: 139 MG/DL (ref 70–99)
HDLC SERPL-MCNC: 37 MG/DL (ref 40–60)
LDL CHOLESTEROL CALCULATED: 100 MG/DL
POTASSIUM SERPL-SCNC: 4.7 MMOL/L (ref 3.5–5.1)
PROSTATE SPECIFIC ANTIGEN: 4.78 NG/ML (ref 0–4)
SODIUM BLD-SCNC: 141 MMOL/L (ref 136–145)
TOTAL PROTEIN: 6.8 G/DL (ref 6.4–8.2)
TRIGL SERPL-MCNC: 106 MG/DL (ref 0–150)
VLDLC SERPL CALC-MCNC: 21 MG/DL

## 2019-04-29 PROCEDURE — G8417 CALC BMI ABV UP PARAM F/U: HCPCS | Performed by: FAMILY MEDICINE

## 2019-04-29 PROCEDURE — 99214 OFFICE O/P EST MOD 30 MIN: CPT | Performed by: FAMILY MEDICINE

## 2019-04-29 PROCEDURE — 72100 X-RAY EXAM L-S SPINE 2/3 VWS: CPT

## 2019-04-29 PROCEDURE — 3017F COLORECTAL CA SCREEN DOC REV: CPT | Performed by: FAMILY MEDICINE

## 2019-04-29 PROCEDURE — G8427 DOCREV CUR MEDS BY ELIG CLIN: HCPCS | Performed by: FAMILY MEDICINE

## 2019-04-29 PROCEDURE — 1123F ACP DISCUSS/DSCN MKR DOCD: CPT | Performed by: FAMILY MEDICINE

## 2019-04-29 PROCEDURE — 1036F TOBACCO NON-USER: CPT | Performed by: FAMILY MEDICINE

## 2019-04-29 PROCEDURE — G8598 ASA/ANTIPLAT THER USED: HCPCS | Performed by: FAMILY MEDICINE

## 2019-04-29 PROCEDURE — 4040F PNEUMOC VAC/ADMIN/RCVD: CPT | Performed by: FAMILY MEDICINE

## 2019-04-29 NOTE — PROGRESS NOTES
Patient is here for low back pain with radiation to upper legs bilateral since 11/18, but increased pain the past couple of weeks. He had been doing some lifting with yard work . No bowel or bladder incontinence. He walked in [place] for 3-4 miles daily with slight problems initially ,but was able to walk and seemed to loosen up. Leaning forward aggravates the pain . H/o piriformis issues in the past.  Stopped running in 12/18 after flare in 11/18. He has continued to walk. Last saw chiropractor 3 weeks ago. Drove yesterday for 6 hours without back pain , with 1 stop for gas. He drove the whole way. Review of Systems    ROS: All other systems were reviewed and are negative . Patient's allergies and medications were reviewed. Patient's past medical, surgical, social , and family history were reviewed. OBJECTIVE:  /84   Pulse 52   Temp 98.2 °F (36.8 °C) (Oral)   Resp 20   Wt 219 lb 9.6 oz (99.6 kg)   SpO2 100%   BMI 32.20 kg/m²     Physical Exam    General: NAD, cooperative, alert and oriented X 3. Mood / affect is good. good insight. well hydrated. Neck : no lymphadenopathy, supple, FROM  CV: Regular rate and rhythm , no murmurs/ rub/ gallop. No edema. Lungs : CTA bilaterally, breathing comfortably  Abdomen: positive bowel sounds, soft , non tender, non distended. No hepatosplenomegaly. No CVA tenderness. Back: non tender. No edema or erythema. Minimal pain with flexion, rotation or hyperextension. Lower extremities : DTRs 2+ knees and ankles bilateral.  FROM. Strength 5/5. Negative straight leg-raise. No edema or erythema bilateral. Right hip: tenderness to lateral hip - inferior to greater trochanter. No edema or erythema. Skin: no rashes. Non tender. ASSESSMENT/  PLAN:  1. Bilateral low back pain without sciatica, unspecified chronicity  - Moist heat . ROM exercises. Modify activities.    - Ambulatory referral to Physical Therapy  - [facility] - [facility]  - XR LUMBAR SPINE (2-3 VIEWS); Future and follow up after completed/ prn.     2. Piriformis syndrome of both sides  - Moist heat . ROM exercises. Modify activities. - Aleve 1-2 po bid prn .  - Ambulatory referral to Physical Therapy  - [facility] - [facility]    3. Trochanteric bursitis of right hip  - Moist heat . ROM exercises. Modify activities. - Aleve 1-2 po bid prn.   - Ambulatory referral to Physical Therapy  - [facility] - [facility]    4. Elevated PSA  - PSA, Prostatic Specific Antigen; Future    5. Essential hypertension  - Controlled and continue Metoprolol XL 50 mg qd, Lisinopril 10 mg qd and low sodium diet. - Comprehensive Metabolic Panel; Future    6. Pure hypercholesterolemia  - continue Lipitor 20 mg qd and low sodium diet. - Comprehensive Metabolic Panel; Future,  Lipid Panel; Future    7. Prediabetes  - Continue dietary/ lifestyle modifications, including decreased concentrated sweets and carbohydrates. - Comprehensive Metabolic Panel; Future, Hemoglobin A1C; Future     Follow up 6 months/ prn.

## 2019-04-29 NOTE — PATIENT INSTRUCTIONS
for each leg. Hip flexor stretch    1. Kneel on the floor with one knee bent and one leg behind you. Place your forward knee over your foot. Keep your other knee touching the floor. 2. Slowly push your hips forward until you feel a stretch in the upper thigh of your rear leg. 3. Hold the stretch for at least 15 to 30 seconds. Repeat with your other leg. 4. Do 2 to 4 times on each side. Wall sit    1. Stand with your back 10 to 12 inches away from a wall. 2. Lean into the wall until your back is flat against it. 3. Slowly slide down until your knees are slightly bent, pressing your lower back into the wall. 4. Hold for about 6 seconds, then slide back up the wall. 5. Repeat 8 to 12 times. Follow-up care is a key part of your treatment and safety. Be sure to make and go to all appointments, and call your doctor if you are having problems. It's also a good idea to know your test results and keep a list of the medicines you take. Where can you learn more? Go to [email or web address] and sign in to your [employer] account. Enter W391 in the [employer] box to learn more about \"Low Back Pain: Exercises. \"     If you do not have an account, please click on the \"Sign Up Now\" link. Current as of: September 20, 2018  Content Version: 11.9  © 4283-1011 [employer]. Care instructions adapted under license by [facility] [employer] [facility] ([facility]). If you have questions about a medical condition or this instruction, always ask your healthcare professional. [place] [address] any warranty or liability for your use of this information. Patient Education        Hip Bursitis: Exercises  Your Care Instructions  Here are some examples of typical rehabilitation exercises for your condition. Start each exercise slowly. Ease off the exercise if you start to have pain.   Your doctor or physical therapist will tell you when you can start these exercises and which ones will work best for you. How to do the exercises  Hip rotator stretch    1. Lie on your back with both knees bent and your feet flat on the floor. 2. Put the ankle of your affected leg on your opposite thigh near your knee. 3. Use your hand to gently push your knee away from your body until you feel a gentle stretch around your hip. 4. Hold the stretch for 15 to 30 seconds. 5. Repeat 2 to 4 times. 6. Repeat steps 1 through 5, but this time use your hand to gently pull your knee toward your opposite shoulder. Iliotibial band stretch    1. Lean sideways against a wall. If you are not steady on your feet, hold on to a chair or counter. 2. Stand on the leg with the affected hip, with that leg close to the wall. Then cross your other leg in front of it. 3. Let your affected hip drop out to the side of your body and against wall. Then lean away from your affected hip until you feel a stretch. 4. Hold the stretch for 15 to 30 seconds. 5. Repeat 2 to 4 times. Straight-leg raises to the outside    1. Lie on your side, with your affected hip on top. 2. Tighten the front thigh muscles of your top leg to keep your knee straight. 3. Keep your hip and your leg straight in line with the rest of your body, and keep your knee pointing forward. Do not drop your hip back. 4. Lift your top leg straight up toward the ceiling, about 12 inches off the floor. Hold for about 6 seconds, then slowly lower your leg. 5. Repeat 8 to 12 times. Clamshell    1. Lie on your side, with your affected hip on top and your head propped on a pillow. Keep your feet and knees together and your knees bent. 2. Raise your top knee, but keep your feet together. Do not let your hips roll back. Your legs should open up like a clamshell. 3. Hold for 6 seconds. 4. Slowly lower your knee back down. Rest for 10 seconds. 5. Repeat 8 to 12 times. Follow-up care is a key part of your treatment and safety.  Be sure to make and go to all appointments, and abdominal strengthening    1. Lie on your back with your knees bent and your feet flat on the floor. 2. Tighten your belly muscles by pulling your belly button in toward your spine. 3. Lift one foot off the floor and bring your knee toward your chest, so that your knee is straight above your hip and your leg is bent like the letter \"L. \"  4. Lift the other knee up to the same position. 5. Lower one leg at a time to the starting position. 6. Keep alternating legs until you have lifted each leg 8 to 12 times. 7. Be sure to keep your belly muscles tight and your back still as you are moving your legs. Be sure to breathe normally. Follow-up care is a key part of your treatment and safety. Be sure to make and go to all appointments, and call your doctor if you are having problems. It's also a good idea to know your test results and keep a list of the medicines you take. Current as of: September 20, 2018  Content Version: 11.9  © 20064518-0755 [employer]. Care instructions adapted under license by [facility] ([facility]). If you have questions about a medical condition or this instruction, always ask your healthcare professional. [place] [address] any warranty or liability for your use of this information.

## 2019-04-30 DIAGNOSIS — E11.9 TYPE 2 DIABETES MELLITUS WITHOUT COMPLICATION, WITHOUT LONG-TERM CURRENT USE OF INSULIN (HCC): Primary | ICD-10-CM

## 2019-04-30 LAB
ESTIMATED AVERAGE GLUCOSE: 145.6 MG/DL
HBA1C MFR BLD: 6.7 %

## 2019-05-02 ENCOUNTER — HOSPITAL ENCOUNTER (OUTPATIENT)
Dept: PHYSICAL THERAPY | Age: 69
Setting detail: THERAPIES SERIES
Discharge: HOME | End: 2019-05-02
Payer: MEDICARE

## 2019-05-02 PROCEDURE — 97110 THERAPEUTIC EXERCISES: CPT | Performed by: PHYSICAL THERAPIST

## 2019-05-02 PROCEDURE — 97161 PT EVAL LOW COMPLEX 20 MIN: CPT | Performed by: PHYSICAL THERAPIST

## 2019-05-02 NOTE — FLOWSHEET NOTE
[facility] [address] [place] [address] [facility]    [facility] Therapy Daily Treatment Note  Date:  2019    Patient Name:  Trav Shukla    :  1950  MRN: [id number]  Restrictions/Precautions:    Medical/Treatment Diagnosis Information:  · Diagnosis: M54.5 (ICD-10-CM) - Bilateral low back pain without sciatica, unspecified chronicity; G57.01, G57.02 (ICD-10-CM) - Piriformis syndrome of both sides;M70.61 (ICD-10-CM) - Trochanteric bursitis of right hip  · Treatment Diagnosis: M48.06, M54.5, M25.651, M25.562, M25.551, M25.552; pain and tightness leading to impaired activity participation  Insurance/Certification information:  PT Insurance Information: MEDICARE  Physician Information:  Referring Practitioner:  Shae Greene MD  Plan of care signed (Y/N):     Date of Patient follow up with Physician: as needed    G-Code (if applicable):         JENNIFER  19 score =10% deficit    Progress Note: [x]  Yes  []  No  Next due by: Visit #10       Latex Allergy:  [x]NO      []YES  Preferred Language for Healthcare:   [x]English       []other:    Visit # Insurance Allowable   1 MEDICARE     Pain level:  2/10     SUBJECTIVE:  See eval    OBJECTIVE:             Standing Exam Normal Abnormal N/A Comments   Toe walk       x     Heel Walk     x     Side bending   x   + pain B, R > L  Lateral hip pain   Pelvic Height x         Fwd Bend- (aberrant juttering or innominate mvmt)   x   + pain at 10 deg, reports tightness   Extension x         Trendelenburg x         Kemps/Quadrant     x     Stork   x   + hypomobility   SLS/SLS w rotation   x   + joint pain B   Roxy sign   x   + pain B  (no pain when in sitting)               Seated Exam Normal Abnormal N/A Comments   Pelvic Height x         Seated Rotation   x   Significant restriction in R rotation   Seated flexion x         B hip IR   x   + for tightness                           Supine Exam Normal Abnormal N/A Comments   Hip flexion x       Abduction   x   + tightness   ER   x   Mild tightness   IR   x   Significant tightness, pain   SHAYAN/Kris   x   + for mild pain   FADIR   x   + B   Hip scour x         SLR x         Crossed SLR x         Supine to sit           SI distraction/ compression   x   + generalized LBP with compression   Hip thrust     x     Leg length       R 36 in  L 35.5 in               Prone Exam Normal Abnormal N/A Comments   Prone knee bend   x   R leg long to even   Prone hip IR     x Unable to tolerate prone knee bend position long enough to do test   B Achilles reflex/Pheasant     x     PA/Spring   x   + pain T10-L5   Prone Instability test     x     Sacral Spring/thrust   x   + pain base and borders                              ROM LEFT RIGHT Comments   Lumbar Flex     Limited d/t pain   Lumbar Ext     WFL   Side Bend Limited by 25% Limited by 50% + pain, R > L   Rotation WFL Limited by 75% +pain on R with R rotation, increased with overpressure                          ROM LEFT RIGHT Comments   Hip Flexion 135 125     Hip Abd 25 20 *Unable to maintain true ABD B, pt immediately starts to ER   Hip ER 40 33     Hip IR 20 10     Hip Extension 5 5     Knee Ext 0 0     Knee Flex 130 126     Hamstring Flex SLR 65 deg SLR 60 deg                                      Strength LEFT RIGHT Comments   Multifidus     Not assessed   Transverse Ab     Not assessed   Hip Flexors 4+/5 4+/5 Increased posterior pain  + groin discomfort   Hip Abductors 4+/5 4+/5     Hip Extensors 4+/5 4+/5     Seated hip ER 4+/5 4+/5     Seated hip IR 4+/5 4+/5     Knee flex 4+/5 4+/5     Knee ext 4+/5 4+/5                            Myotomes Normal Abnormal Comments   Hip flexion (L1-L2) x       Knee extension (L2-L4) x       Dorsiflexion (L4-L5) x       Great Toe Ext (L5) x       Ankle Eversion (S1-S2) x       Ankle PF(S1-S2) x             Dermatomes Normal Abnormal Comments   inguinal area (L1)  x       anterior mid-thigh (L2) x       distal ant thigh/med prognosis and PT plan of care. Educated on [address]. Pt questions were addressed and answered. Therapeutic Exercise and NMR EXR:  [x] (39196) Provided verbal/tactile cueing for activities related to strengthening, flexibility, endurance, ROM for improvements in LE, proximal hip, and core control with self care, mobility, lifting, ambulation.  [] (59455) Provided verbal/tactile cueing for activities related to improving balance, coordination, kinesthetic sense, posture, motor skill, proprioception  to assist with LE, proximal hip, and core control in self care, mobility, lifting, ambulation and eccentric single leg control.      NMR and Therapeutic Activities:    [] (98758 or 75432) Provided verbal/tactile cueing for activities related to improving balance, coordination, kinesthetic sense, posture, motor skill, proprioception and motor activation to allow for proper function of core, proximal hip and LE with self care and ADLs  [] (98198) Gait Re-education- Provided training and instruction to the patient for proper LE, core and proximal hip recruitment and positioning and eccentric body weight control with ambulation re-education including up and down stairs     Home Exercise Program:    [x] (33448) Reviewed/Progressed HEP activities related to strengthening, flexibility, endurance, ROM of core, proximal hip and LE for functional self-care, mobility, lifting and ambulation/stair navigation   [] (49870)Reviewed/Progressed HEP activities related to improving balance, coordination, kinesthetic sense, posture, motor skill, proprioception of core, proximal hip and LE for self care, mobility, lifting, and ambulation/stair navigation      Manual Treatments:    [] (52474) Provided manual therapy to mobilize LE, proximal hip and/or LS spine soft tissue/joints for the purpose of modulating pain, promoting relaxation,  increasing ROM, reducing/eliminating soft tissue swelling/inflammation/restriction, improving soft tissue extensibility and allowing for proper ROM for normal function with self care, mobility, lifting and ambulation. Modalities: None     Charges:  Timed Code Treatment Minutes: 40   Total Treatment Minutes: 90   Time in: 12:44  Time out: 2:16    [x] EVAL  [x] LF(64909) x  3   [] IONTO  [] NMR (12961) x      [] VASO  [] Manual (79217) x       [] Other:  [] TA x       [] Mech Traction (02682)  [] ES(attended) (50152)      [] ES (un) (01428):     GOALS:   Patient stated goal: Get back to walking and running, movement without pain     Therapist goals for Patient:   Short Term Goals: To be achieved in: 2 weeks  1. Independent in HEP and progression per patient tolerance, in order to prevent re-injury. 2. Patient will have a decrease in pain to facilitate improvement in movement, function, and ADLs as indicated by Functional Deficits.     Long Term Goals: To be achieved in: 6 weeks  1. Disability index score of 0% for the JENNIFER to assist with reaching prior level of function. 2. Patient will demonstrate increased AROM to WNL, good LS mobility, good hip ROM to allow for proper joint functioning as indicated by patients Functional Deficits. 3. Patient will tolerate walking for >1 hour without increased symptoms or restriction. 4. Patient will return to ADLs, IADLs and functional activities without increased symptoms or restriction. 5. Patient will tolerate progressive return to running. Progression Towards Functional goals:  [] Patient is progressing as expected towards functional goals listed. [] Progression is slowed due to complexities listed. [] Progression has been slowed due to co-morbidities.   [x] Plan just implemented, too soon to assess goals progression  [] Other:     ASSESSMENT:  See eval    Treatment/Activity Tolerance:  [x] Patient tolerated treatment well [] Patient limited by fatique  [] Patient limited by pain  [] Patient limited by other medical complications  [x] Other:  Pt reported a reduction in symptoms by completion of session. Prognosis: [x] Good [] Fair  [] Poor    Patient Requires Follow-up: [x] Yes  [] No    PLAN: See eval  [] Continue per plan of care [] Alter current plan (see comments)  [x] Plan of care initiated [] Hold pending MD visit [] Discharge    Electronically signed by: Mary Ellen Todd PT, DPT  Physical Therapist  [id number]  [email or web address]    *Note: If patient does not return for scheduled / recommended follow up visit, this note will serve as their discharge note from physical therapy.

## 2019-05-02 NOTE — PLAN OF CARE
The [address] [place] [address] and [facility] [address]22      Physical Therapy Certification    Dear Referring Practitioner: Brendan Vela MD,    We had the pleasure of evaluating the following patient for physical therapy services at [address] [place] [address]. A summary of our findings can be found in the initial assessment below. This includes our plan of care. If you have any questions or concerns regarding these findings, please do not hesitate to contact me at the office phone number checked above. Thank you for the referral.       Physician Signature:_______________________________Date:__________________  By signing above (or electronic signature), therapists plan is approved by physician      Patient: Timothy Taveras   : 1950   MRN: [id number]  Referring Physician: Referring Practitioner: Brendan Vela MD      Evaluation Date: 2019      Medical Diagnosis Information:  Diagnosis: M54.5 (ICD-10-CM) - Bilateral low back pain without sciatica, unspecified chronicity; G57.01, G57.02 (ICD-10-CM) - Piriformis syndrome of both sides;M70.61 (ICD-10-CM) - Trochanteric bursitis of right hip   Treatment Diagnosis: M48.06, M54.5, M25.651, M25.562, M25.551, M25.552; pain and tightness leading to impaired activity participation                                         Insurance information: PT Insurance Information: MEDICARE     Precautions/ Contra-indications:   Latex Allergy:  [x]NO      []YES  Preferred Language for Healthcare:   [x]English       []other:    SUBJECTIVE: Patient stated complaint: Pt presents to PT with B back/glute/hip pain. Pt was training for a half marathon to take place in 2018. In September he started to notice B back/hip/piriformis pain, R > L. Pt states that he has always been very tight and when he used to get massages they would always need to focus on glutes/piriformis.  Pt states that he was up to running about 7 miles when the symptoms started, Height x      Fwd Bend- (aberrant juttering or innominate mvmt)  x  + pain at 10 deg, reports tightness   Extension x      Trendelenburg x      Kemps/Quadrant   x    Stork  x  + hypomobility   SLS/SLS w rotation  x  + joint pain B   Roxy sign  x  + pain B  (no pain when in sitting)          Seated Exam Normal Abnormal N/A Comments   Pelvic Height x      Seated Rotation  x  Significant restriction in R rotation   Seated flexion x      B hip IR  x  + for tightness                 Supine Exam Normal Abnormal N/A Comments   Hip flexion x      Abduction  x  + tightness   ER  x  Mild tightness   IR  x  Significant tightness, pain   SHAYAN/Kris  x  + for mild pain   FADIR  x  + B   Hip scour x      SLR x      Crossed SLR x      Supine to sit       SI distraction/ compression  x  + generalized LBP with compression   Hip thrust   x    Leg length    R 36 in  L 35.5 in          Prone Exam Normal Abnormal N/A Comments   Prone knee bend  x  R leg long to even   Prone hip IR   x Unable to tolerate prone knee bend position long enough to do test   B Achilles reflex/Pheasant   x    PA/Spring  x  + pain T10-L5   Prone Instability test   x    Sacral Spring/thrust  x  + pain base and borders                   ROM LEFT RIGHT Comments   Lumbar Flex   Limited d/t pain   Lumbar Ext   WFL   Side Bend Limited by 25% Limited by 50% + pain, R > L   Rotation WFL Limited by 75% +pain on R with R rotation, increased with overpressure                 ROM LEFT RIGHT Comments   Hip Flexion 135 125    Hip Abd 25 20 *Unable to maintain true ABD B, pt immediately starts to ER   Hip ER 40 33    Hip IR 20 10    Hip Extension 5 5    Knee Ext 0 0    Knee Flex 130 126    Hamstring Flex SLR 65 deg SLR 60 deg                        Strength LEFT RIGHT Comments   Multifidus   Not assessed   Transverse Ab   Not assessed   Hip Flexors 4+/5 4+/5 Increased posterior pain  + groin discomfort   Hip Abductors 4+/5 4+/5    Hip Extensors 4+/5 4+/5    Seated hip ER 4+/5 4+/5    Seated hip IR 4+/5 4+/5    Knee flex 4+/5 4+/5    Knee ext 4+/5 4+/5                   Myotomes Normal Abnormal Comments   Hip flexion (L1-L2) x     Knee extension (L2-L4) x     Dorsiflexion (L4-L5) x     Great Toe Ext (L5) x     Ankle Eversion (S1-S2) x     Ankle PF(S1-S2) x         Dermatomes Normal Abnormal Comments   inguinal area (L1)  x     anterior mid-thigh (L2) x     distal ant thigh/med knee (L3) x     medial lower leg and foot (L4) x     lateral lower leg and foot (L5) x     posterior calf (S1) x     medial calcaneus (S2) x         Neural dynamic tension testing Normal Abnormal Comments   Slump Test  - Degree of knee flexion:  x     SLR       0-30 x     30-70  x + for tightness   Femoral nerve (L2-4) x       Not assessed  Reflexes Normal Abnormal Comments   S1-2 Seated achilles      S1-2 Prone knee bend      L3-4 Patellar tendon      C5-6 Biceps      C6 Brachioradialis      C7-8 Triceps      Clonus      Babinski      Mckeon's        Joint mobility: Decreased B hip; decreased PA mobility lower thoracic and lumbar   []Normal    [x]Hypo   []Hyper    Palpation: SP of T10-L5, Based and sides of sacrum, lumbar paraspinals, glute med/max/piriformis, proximal bicep femoris    Functional Mobility/Transfers: Independent    Posture: Rounded shoulders    Gait: + B out toeing                        [x] Patient history, allergies, meds reviewed. Medical chart reviewed. See intake form. Review Of Systems (ROS):  [x]Performed Review of systems (Integumentary, CardioPulmonary, Neurological) by intake and observation. Intake form has been scanned into medical record. Patient has been instructed to contact their primary care physician regarding ROS issues if not already being addressed at this time.       Co-morbidities/Complexities (which will affect course of rehabilitation):   [x]None           Arthritic conditions   []Rheumatoid arthritis (M05.9)  []Osteoarthritis (M19.91)   Cardiovascular conditions []Hypertension (I10)  []Hyperlipidemia (E78.5)  []Angina pectoris (I20)  []Atherosclerosis (I70)   Musculoskeletal conditions   []Disc pathology   []Congenital spine pathologies   []Prior surgical intervention  []Osteoporosis (M81.8)  []Osteopenia (M85.8)   Endocrine conditions   []Hypothyroid (E03.9)  []Hyperthyroid Gastrointestinal conditions   []Constipation (S37.15)   Metabolic conditions   []Morbid obesity (E66.01)  []Diabetes type 1(E10.65) or 2 (E11.65)   []Neuropathy (G60.9)     Pulmonary conditions   []Asthma (J45)  []Coughing   []COPD (J44.9)   Psychological Disorders  []Anxiety (F41.9)  []Depression (F32.9)   []Other:   []Other:           Barriers to/and or personal factors that will affect rehab potential:              []Age  []Sex              [x]Motivation/Lack of Motivation                        []Co-Morbidities              []Cognitive Function, education/learning barriers              []Environmental, home barriers              []profession/work barriers  [x]past PT/medical experience  []other:  Justification: Pt is active, motivated to get better and has responded well to PT in the past    Falls Risk Assessment (30 days):   [x] Falls Risk assessed and no intervention required.   [] Falls Risk assessed and Patient requires intervention due to being higher risk   TUG score (>12s at risk):     [] Falls education provided, including       ASSESSMENT:   Functional Impairments:     [x]Noted lumbar/proximal hip hypomobility   []Noted lumbosacral and/or generalized hypermobility   [x]Decreased Lumbosacral/hip/LE functional ROM   [x]Decreased core/proximal hip strength and neuromuscular control    [x]Decreased LE functional strength    []Abnormal reflexes/sensation/myotomal/dermatomal deficits  [x]Reduced balance/proprioceptive control    []other:      Functional Activity Limitations (from functional questionnaire and intake)   [x]Reduced ability to tolerate prolonged functional positions   [x]Reduced ability or difficulty with changes of positions or transfers between positions   []Reduced ability to maintain good posture and demonstrate good body mechanics with sitting, bending, and lifting   []Reduced ability to sleep   [] Reduced ability or tolerance with driving and/or computer work   []Reduced ability to perform lifting, reaching, carrying tasks   []Reduced ability to squat   [x]Reduced ability to forward bend   [x]Reduced ability to ambulate prolonged functional periods/distances/surfaces   []Reduced ability to ascend/descend stairs   []other:       Participation Restrictions   []Reduced participation in self care activities   []Reduced participation in home management activities   []Reduced participation in work activities   [x]Reduced participation in social activities. [x]Reduced participation in sport/recreational activities. Classification:   []Signs/symptoms consistent with Lumbar instability/stabilization subgroup. []Signs/symptoms consistent with Lumbar mobilization/manipulation subgroup, myotomes and dermatomes intact. Meets manipulation criteria. []Signs/symptoms consistent with Lumbar direction specific/centralization subgroup   []Signs/symptoms consistent with Lumbar traction subgroup     []Signs/symptoms consistent with lumbar facet dysfunction   [x]Signs/symptoms consistent with lumbar stenosis type dysfunction   []Signs/symptoms consistent with nerve root involvement including myotome & dermatome dysfunction   []Signs/symptoms consistent with post-surgical status including: decreased ROM, strength and function.    [x]signs/symptoms consistent with pathology which may benefit from Dry needling     [x]other: muscle tightness, mobility deficits     Prognosis/Rehab Potential:      []Excellent   [x]Good    []Fair   []Poor    Tolerance of evaluation/treatment:    []Excellent   [x]Good    []Fair   []Poor    Physical Therapy Evaluation Complexity Justification  [x] A history of present problem with:  [x] no personal factors and/or comorbidities that impact the plan of care;  []1-2 personal factors and/or comorbidities that impact the plan of care  []3 personal factors and/or comorbidities that impact the plan of care  [x] An examination of body systems using standardized tests and measures addressing any of the following: body structures and functions (impairments), activity limitations, and/or participation restrictions;:  [] a total of 1-2 or more elements   [] a total of 3 or more elements   [x] a total of 4 or more elements   [x] A clinical presentation with:  [x] stable and/or uncomplicated characteristics   [] evolving clinical presentation with changing characteristics  [] unstable and unpredictable characteristics;   [x] Clinical decision making of [x] low, [] moderate, [] high complexity using standardized patient assessment instrument and/or measurable assessment of functional outcome. [] EVAL (LOW) 29918 (typically 20 minutes face-to-face)  [] EVAL (MOD) 54227 (typically 30 minutes face-to-face)  [] EVAL (HIGH) 63172 (typically 45 minutes face-to-face)  [] RE-EVAL     PLAN:     Frequency/Duration:  1-2 days per week for 6 Weeks:  Interventions:  [x]  Therapeutic exercise including: strength training, ROM, for LE, Glutes and core   [x]  NMR activation and proprioception for glutes , LE and Core   [x]  Manual therapy as indicated for Hip complex, LE and spine to include: Dry Needling/IASTM, STM, PROM, Gr I-IV mobilizations, manipulation. [x]  Modalities as needed that may include: thermal agents, E-stim, Biofeedback, US, iontophoresis as indicated  [x]  Patient education on joint protection, postural re-education, activity modification, progression of HEP. HEP instruction:   Initiated 5/2/19. Printed hand out given. Pt demonstrated proper form of each exercise and expressed verbal understanding of frequency and duration.     GOALS:  Patient stated goal: Get back to walking and running, movement without pain    Therapist goals for Patient:   Short Term Goals: To be achieved in: 2 weeks  1. Independent in HEP and progression per patient tolerance, in order to prevent re-injury. 2. Patient will have a decrease in pain to facilitate improvement in movement, function, and ADLs as indicated by Functional Deficits. Long Term Goals: To be achieved in: 6 weeks  1. Disability index score of 0% for the JENNIFER to assist with reaching prior level of function. 2. Patient will demonstrate increased AROM to WNL, good LS mobility, good hip ROM to allow for proper joint functioning as indicated by patients Functional Deficits. 3. Patient will tolerate walking for >1 hour without increased symptoms or restriction. 4. Patient will return to ADLs, IADLs and functional activities without increased symptoms or restriction. 5. Patient will tolerate progressive return to running. Electronically signed by:  Juan Jose Anderson PT, DPT  Physical Therapist  [id number]  [email or web address]

## 2019-05-08 ENCOUNTER — HOSPITAL ENCOUNTER (OUTPATIENT)
Dept: PHYSICAL THERAPY | Age: 69
Setting detail: THERAPIES SERIES
Discharge: HOME | End: 2019-05-08
Payer: MEDICARE

## 2019-05-08 PROCEDURE — 97110 THERAPEUTIC EXERCISES: CPT | Performed by: PHYSICAL THERAPIST

## 2019-05-08 PROCEDURE — 97140 MANUAL THERAPY 1/> REGIONS: CPT | Performed by: PHYSICAL THERAPIST

## 2019-05-08 NOTE — FLOWSHEET NOTE
The [address] [place] [address] and Sports Rehabilitation, [facility]    Physical Therapy Daily Treatment Note  Date:  2019    Patient Name:  Fely Goldstein    :  1950  MRN: [id number]  Restrictions/Precautions:    Medical/Treatment Diagnosis Information:  · Diagnosis: M54.5 (ICD-10-CM) - Bilateral low back pain without sciatica, unspecified chronicity; G57.01, G57.02 (ICD-10-CM) - Piriformis syndrome of both sides;M70.61 (ICD-10-CM) - Trochanteric bursitis of right hip  · Treatment Diagnosis: M48.06, M54.5, M25.651, M25.562, M25.551, M25.552; pain and tightness leading to impaired activity participation  Insurance/Certification information:  PT Insurance Information: MEDICARE  Physician Information:  Referring Practitioner: Susanne Mixon MD  Plan of care signed (Y/N):     Date of Patient follow up with Physician: as needed    G-Code (if applicable):         JENNIFER  19 score =10% deficit    Progress Note: [x]  Yes  []  No  Next due by: Visit #10       Latex Allergy:  [x]NO      []YES  Preferred Language for Healthcare:   [x]English       []other:    Visit # Insurance Allowable   2 MEDICARE     Pain level:  2/10     SUBJECTIVE: Pt states that he has been feeling sore, muscle soreness- 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 Pt states he felt fine while working, but as the evening progressed he noted that his back/hips got tighter and tighter to the point where he he had trouble sleeping. He felt relief after stretching the last few days. Pt states that foam rolling is very painful.     OBJECTIVE:         Standing Exam Normal Abnormal N/A Comments   Toe walk       x     Heel Walk     x     Side bending   x   + pain B, R > L  Lateral hip pain   Pelvic Height x         Fwd Bend- (aberrant juttering or innominate mvmt)   x   + pain at 10 deg, reports tightness   Extension x         Trendelenburg x         Kemps/Quadrant     x     Stork   x   + hypomobility SLS/SLS w rotation   x   + joint pain B   Roxy sign   x   + pain B  (no pain when in sitting)               Seated Exam Normal Abnormal N/A Comments   Pelvic Height x         Seated Rotation   x   Significant restriction in R rotation   Seated flexion x         B hip IR   x   + for tightness                           Supine Exam Normal Abnormal N/A Comments   Hip flexion x         Abduction   x   + tightness   ER   x   Mild tightness   IR   x   Significant tightness, pain   SHAYAN/Kris   x   + for mild pain   FADIR   x   + B   Hip scour x         SLR x         Crossed SLR x         Supine to sit           SI distraction/ compression   x   + generalized LBP with compression   Hip thrust     x     Leg length       R 36 in  L 35.5 in               Prone Exam Normal Abnormal N/A Comments   Prone knee bend   x   R leg long to even   Prone hip IR     x Unable to tolerate prone knee bend position long enough to do test   B Achilles reflex/Pheasant     x     PA/Spring   x   + pain T10-L5   Prone Instability test     x     Sacral Spring/thrust   x   + pain base and borders                              ROM LEFT RIGHT Comments   Lumbar Flex     Limited d/t pain   Lumbar Ext     WFL   Side Bend Limited by 25% Limited by 50% + pain, R > L   Rotation WFL Limited by 75% +pain on R with R rotation, increased with overpressure                          ROM LEFT RIGHT Comments   Hip Flexion 135 125     Hip Abd 25 20 *Unable to maintain true ABD B, pt immediately starts to ER   Hip ER 40 33     Hip IR 20 10     Hip Extension 5 5     Knee Ext 0 0     Knee Flex 130 126     Hamstring Flex SLR 65 deg SLR 60 deg                                      Strength LEFT RIGHT Comments   Multifidus     Not assessed   Transverse Ab     Not assessed   Hip Flexors 4+/5 4+/5 Increased posterior pain  + groin discomfort   Hip Abductors 4+/5 4+/5     Hip Extensors 4+/5 4+/5     Seated hip ER 4+/5 4+/5     Seated hip IR 4+/5 4+/5     Knee flex 4+/5 sense, posture, motor skill, proprioception of core, proximal hip and LE for self care, mobility, lifting, and ambulation/stair navigation      Manual Treatments:    Tiger tail QL B, (following hot pack)  [x] (17489) Provided manual therapy to mobilize LE, proximal hip and/or LS spine soft tissue/joints for the purpose of modulating pain, promoting relaxation,  increasing ROM, reducing/eliminating soft tissue swelling/inflammation/restriction, improving soft tissue extensibility and allowing for proper ROM for normal function with self care, mobility, lifting and ambulation. Modalities: 10' hot back low back/glutes beginning of session     Charges:  Timed Code Treatment Minutes: 38   Total Treatment Minutes: 38   Time in: 8:00  Time out: 9:17    [] EVAL  [x] YD(01983) x  2   [] IONTO  [] NMR (21467) x      [] VASO  [x] Manual (04520) x  1    [] Other:  [] TA x       [] Mech Traction (17408)  [] ES(attended) (73504)      [] ES (un) (89262):     GOALS:   Patient stated goal: Get back to walking and running, movement without pain     Therapist goals for Patient:   Short Term Goals: To be achieved in: 2 weeks  1. Independent in HEP and progression per patient tolerance, in order to prevent re-injury. 2. Patient will have a decrease in pain to facilitate improvement in movement, function, and ADLs as indicated by Functional Deficits.     Long Term Goals: To be achieved in: 6 weeks  1. Disability index score of 0% for the JENNIFER to assist with reaching prior level of function. 2. Patient will demonstrate increased AROM to WNL, good LS mobility, good hip ROM to allow for proper joint functioning as indicated by patients Functional Deficits. 3. Patient will tolerate walking for >1 hour without increased symptoms or restriction. 4. Patient will return to ADLs, IADLs and functional activities without increased symptoms or restriction. 5. Patient will tolerate progressive return to running.      Progression Towards Functional goals:  [] Patient is progressing as expected towards functional goals listed. [] Progression is slowed due to complexities listed. [] Progression has been slowed due to co-morbidities. [x] Plan just implemented, too soon to assess goals progression  [] Other:     ASSESSMENT:  Good tolerance to hot pack and tiger tail to QL. Improved tolerance to foam rolling with education and demonstration on proper use/position of foam roll and how to use body to off load pressure, pt reported that it was much more tolerable. Pt is able to pinpoint specific spot in L glute/piriformis region that is more tender and painful than other areas. Pt tolerated clamshells well on R side, reported muscle burning and fatigue, but did report pain by end of second set on L side, stated that it felt different then the muscle burn on the R. Good tolerance to bridges. Pt required initial VC for proper set up on clams but then able to complete correctly. Pt has palpable trigger points in glutes, pt responds well to [facility] and trigger point release, but his symptom relief is short term. This is a chronic problem that pt has been attempting to resolve for a long time. Pt would benefit from dry needling treatment. PT will discuss DN with MD and ask for referral. Pt requires PT follow up to address ROM, strength and functional mobility deficits. Treatment/Activity Tolerance:  [x] Patient tolerated treatment well [] Patient limited by fatique  [] Patient limited by pain  [] Patient limited by other medical complications  [x] Other:  Pt reported a reduction in symptoms by completion of session. Prognosis: [x] Good [] Fair  [] Poor    Patient Requires Follow-up: [x] Yes  [] No    PLAN: See eval  [x] Continue per plan of care [] Alter current plan (see comments)  [] Plan of care initiated [] Hold pending MD visit [] Discharge    Electronically signed by: Rashida Holloway PT, DPT  Physical Therapist  [id number]  [email or web address]    *Note: If patient does not return for scheduled / recommended follow up visit, this note will serve as their discharge note from physical therapy.

## 2019-05-09 DIAGNOSIS — M70.61 TROCHANTERIC BURSITIS OF RIGHT HIP: ICD-10-CM

## 2019-05-09 DIAGNOSIS — G57.03 PIRIFORMIS SYNDROME OF BOTH SIDES: Primary | ICD-10-CM

## 2019-05-14 ENCOUNTER — HOSPITAL ENCOUNTER (OUTPATIENT)
Dept: PHYSICAL THERAPY | Age: 69
Setting detail: THERAPIES SERIES
Discharge: HOME | End: 2019-05-14
Payer: MEDICARE

## 2019-05-14 PROCEDURE — 97530 THERAPEUTIC ACTIVITIES: CPT | Performed by: PHYSICAL THERAPIST

## 2019-05-14 PROCEDURE — 97110 THERAPEUTIC EXERCISES: CPT | Performed by: PHYSICAL THERAPIST

## 2019-05-14 PROCEDURE — 97150 GROUP THERAPEUTIC PROCEDURES: CPT | Performed by: PHYSICAL THERAPIST

## 2019-05-14 NOTE — FLOWSHEET NOTE
[facility]    Physical Therapy Daily Treatment Note  Date:  2019    Patient Name:  Bari Jones    :  1950  MRN: [id number]  Restrictions/Precautions:    Medical/Treatment Diagnosis Information:  · Diagnosis: M54.5 (ICD-10-CM) - Bilateral low back pain without sciatica, unspecified chronicity; G57.01, G57.02 (ICD-10-CM) - Piriformis syndrome of both sides;M70.61 (ICD-10-CM) - Trochanteric bursitis of right hip  · Treatment Diagnosis: M48.06, M54.5, M25.651, M25.562, M25.551, M25.552; pain and tightness leading to impaired activity participation  Insurance/Certification information:  PT Insurance Information: MEDICARE  Physician Information:  Referring Practitioner: Janeth Gaitan MD  Plan of care signed (Y/N):     Date of Patient follow up with Physician: as needed    G-Code (if applicable):         JENNIFER  19 score =10% deficit    Progress Note: [x]  Yes  []  No  Next due by: Visit #10       Latex Allergy:  [x]NO      []YES  Preferred Language for Healthcare:   [x]English       []other:    Visit # Insurance Allowable   3 MEDICARE     Pain level:  2/10     SUBJECTIVE: Pt states that he has only muscle soreness today but overall he has been having a little more pain. Pt states that he has been trying to go lighter on the foam rolling as advised but states that he doesn't feel like he is accomplishing anything if he doesn't make it hurt.     OBJECTIVE:         Standing Exam Normal Abnormal N/A Comments   Toe walk       x     Heel Walk     x     Side bending   x   + pain B, R > L  Lateral hip pain   Pelvic Height x         Fwd Bend- (aberrant juttering or innominate mvmt)   x   + pain at 10 deg, reports tightness   Extension x         Trendelenburg x         Kemps/Quadrant     x     Stork   x   + hypomobility   SLS/SLS w rotation   x   + joint pain B   Roxy sign   x   + pain B  (no pain when in sitting)               Seated Exam Normal Abnormal N/A Comments   Pelvic Height x         Seated Rotation   x   Significant restriction in R rotation   Seated flexion x         B hip IR   x   + for tightness                           Supine Exam Normal Abnormal N/A Comments   Hip flexion x         Abduction   x   + tightness   ER   x   Mild tightness   IR   x   Significant tightness, pain   SHAYAN/Kris   x   + for mild pain   FADIR   x   + B   Hip scour x         SLR x         Crossed SLR x         Supine to sit           SI distraction/ compression   x   + generalized LBP with compression   Hip thrust     x     Leg length       R 36 in  L 35.5 in               Prone Exam Normal Abnormal N/A Comments   Prone knee bend   x   R leg long to even   Prone hip IR     x Unable to tolerate prone knee bend position long enough to do test   B Achilles reflex/Pheasant     x     PA/Spring   x   + pain T10-L5   Prone Instability test     x     Sacral Spring/thrust   x   + pain base and borders                              ROM LEFT RIGHT Comments   Lumbar Flex     Limited d/t pain   Lumbar Ext     WFL   Side Bend Limited by 25% Limited by 50% + pain, R > L   Rotation WFL Limited by 75% +pain on R with R rotation, increased with overpressure                          ROM LEFT RIGHT Comments   Hip Flexion 135 125     Hip Abd 25 20 *Unable to maintain true ABD B, pt immediately starts to ER   Hip ER 40 33     Hip IR 20 10     Hip Extension 5 5     Knee Ext 0 0     Knee Flex 130 126     Hamstring Flex SLR 65 deg SLR 60 deg                                      Strength LEFT RIGHT Comments   Multifidus     Not assessed   Transverse Ab     Not assessed   Hip Flexors 4+/5 4+/5 Increased posterior pain  + groin discomfort   Hip Abductors 4+/5 4+/5     Hip Extensors 4+/5 4+/5     Seated hip ER 4+/5 4+/5     Seated hip IR 4+/5 4+/5     Knee flex 4+/5 4+/5     Knee ext 4+/5 4+/5                            Myotomes Normal Abnormal Comments   Hip flexion (L1-L2) x       Knee Cable Column          Leg Press  RANGE:        Bike     Treadmill            Plan for next session: progress as tolerated    Patient Education:  5/2/19 Pt educated on diagnosis, prognosis and PT plan of care. Educated on [address]. Pt questions were addressed and answered. Therapeutic Exercise and NMR EXR:  [x] (74047) Provided verbal/tactile cueing for activities related to strengthening, flexibility, endurance, ROM for improvements in LE, proximal hip, and core control with self care, mobility, lifting, ambulation.  [] (43319) Provided verbal/tactile cueing for activities related to improving balance, coordination, kinesthetic sense, posture, motor skill, proprioception  to assist with LE, proximal hip, and core control in self care, mobility, lifting, ambulation and eccentric single leg control.      NMR and Therapeutic Activities:    [] (91879 or 74698) Provided verbal/tactile cueing for activities related to improving balance, coordination, kinesthetic sense, posture, motor skill, proprioception and motor activation to allow for proper function of core, proximal hip and LE with self care and ADLs  [] (96326) Gait Re-education- Provided training and instruction to the patient for proper LE, core and proximal hip recruitment and positioning and eccentric body weight control with ambulation re-education including up and down stairs     Home Exercise Program:    [x] (47740) Reviewed/Progressed HEP activities related to strengthening, flexibility, endurance, ROM of core, proximal hip and LE for functional self-care, mobility, lifting and ambulation/stair navigation   [] (84506)Reviewed/Progressed HEP activities related to improving balance, coordination, kinesthetic sense, posture, motor skill, proprioception of core, proximal hip and LE for self care, mobility, lifting, and ambulation/stair navigation      Manual Treatments:      Paraspinal and glute stretching  [x] (94701) Provided manual therapy to mobilize LE, proximal hip and/or LS spine soft tissue/joints for the purpose of modulating pain, promoting relaxation,  increasing ROM, reducing/eliminating soft tissue swelling/inflammation/restriction, improving soft tissue extensibility and allowing for proper ROM for normal function with self care, mobility, lifting and ambulation. Modalities:     Charges:  Timed Code Treatment Minutes: 38   Total Treatment Minutes: 38   Time in: 9:05  Time out: 10:14    [] EVAL  [x] GF(74927) x  1   [] IONTO  [] NMR (07272) x      [] VASO  [x] Manual (17333) x  1    [x] Other: Group x1  [] TA x       [] Mech Traction (57051)  [] ES(attended) (90293)      [] ES (un) (68014):     GOALS:   Patient stated goal: Get back to walking and running, movement without pain     Therapist goals for Patient:   Short Term Goals: To be achieved in: 2 weeks  1. Independent in HEP and progression per patient tolerance, in order to prevent re-injury. 2. Patient will have a decrease in pain to facilitate improvement in movement, function, and ADLs as indicated by Functional Deficits.     Long Term Goals: To be achieved in: 6 weeks  1. Disability index score of 0% for the JENNIFER to assist with reaching prior level of function. 2. Patient will demonstrate increased AROM to WNL, good LS mobility, good hip ROM to allow for proper joint functioning as indicated by patients Functional Deficits. 3. Patient will tolerate walking for >1 hour without increased symptoms or restriction. 4. Patient will return to ADLs, IADLs and functional activities without increased symptoms or restriction. 5. Patient will tolerate progressive return to running. Progression Towards Functional goals:  [] Patient is progressing as expected towards functional goals listed. [] Progression is slowed due to complexities listed. [] Progression has been slowed due to co-morbidities.   [x] Plan just implemented, too soon to assess goals progression  [] Other:     ASSESSMENT:  MD referral for DN has been obtained, see referral tab. Will initiate DN at [facility] as trained PT was unable to do so this date d/t an evaluation being scheduled at the same time. Pt tolerated PT directed stretching well, reported that it was comfortable. Pt challenged by bridges this date, stated that he felt fatigue initially but then he began to have pain, the same pain that he feels \"throughout the entire day. \" Lengthy time discussing difference between pain and fatigue during exercises. Pt states that joss felt fatiguing but he was able to continue where as martin started to feel as though it was something he didn't want to do, or shouldn't do. Pt challenged by ab brace, required cueing to activate TA while avoiding RA activation. Pt did report increase in back tightness/pain, this could be d/t pt compensating with quad and glute activation. Pt requires PT follow up to address ROM, strength and functional mobility deficits. Treatment/Activity Tolerance:  [x] Patient tolerated treatment well [] Patient limited by fatique  [] Patient limited by pain  [] Patient limited by other medical complications  [x] Other:  Pt reported a reduction in symptoms by completion of session. Prognosis: [x] Good [] Fair  [] Poor    Patient Requires Follow-up: [x] Yes  [] No    PLAN: See eval  [x] Continue per plan of care [] Alter current plan (see comments)  [] Plan of care initiated [] Hold pending MD visit [] Discharge    Electronically signed by: Kristine Box PT, DPT  Physical Therapist  [id number]  [email or web address]    *Note: If patient does not return for scheduled / recommended follow up visit, this note will serve as their discharge note from physical therapy.

## 2019-05-17 ENCOUNTER — APPOINTMENT (OUTPATIENT)
Dept: PHYSICAL THERAPY | Age: 69
End: 2019-05-17
Payer: MEDICARE

## 2019-05-21 ENCOUNTER — HOSPITAL ENCOUNTER (OUTPATIENT)
Dept: PHYSICAL THERAPY | Age: 69
Setting detail: THERAPIES SERIES
Discharge: HOME | End: 2019-05-21
Payer: MEDICARE

## 2019-05-21 PROCEDURE — 97112 NEUROMUSCULAR REEDUCATION: CPT | Performed by: PHYSICAL THERAPIST

## 2019-05-21 PROCEDURE — 97110 THERAPEUTIC EXERCISES: CPT | Performed by: PHYSICAL THERAPIST

## 2019-05-21 PROCEDURE — 97140 MANUAL THERAPY 1/> REGIONS: CPT

## 2019-05-21 PROCEDURE — 9990000033 HC CUSTOM FOOT ORTHOSIS: Performed by: PHYSICAL THERAPIST

## 2019-05-21 NOTE — FLOWSHEET NOTE
[facility] [address] [place] [address] [facility]    Physical Therapy Daily Treatment Note  Date:  2019    Patient Name:  Prescott Gaucher    :  1950  MRN: [id number]  Restrictions/Precautions:    Medical/Treatment Diagnosis Information:  · Diagnosis: M54.5 (ICD-10-CM) - Bilateral low back pain without sciatica, unspecified chronicity; G57.01, G57.02 (ICD-10-CM) - Piriformis syndrome of both sides;M70.61 (ICD-10-CM) - Trochanteric bursitis of right hip  · Treatment Diagnosis: M48.06, M54.5, M25.651, M25.562, M25.551, M25.552; pain and tightness leading to impaired activity participation  Insurance/Certification information:  PT Insurance Information: MEDICARE  Physician Information:  Referring Practitioner: Indiana Ames MD  Plan of care signed (Y/N):     Date of Patient follow up with Physician: as needed    G-Code (if applicable):         JENNIFER  19 score =10% deficit    Progress Note: [x]  Yes  []  No  Next due by: Visit #10       Latex Allergy:  [x]NO      []YES  Preferred Language for Healthcare:   [x]English       []other:    Visit # Insurance Allowable   4 MEDICARE     Pain level:  2/10     SUBJECTIVE: Pt states that he has good days and bad days, sometimes he has a lot of pain and sometimes not much at all. Pt states that his back feels \"bad\" probably \"80% of the time. \"    OBJECTIVE:         Standing Exam Normal Abnormal N/A Comments   Toe walk       x     Heel Walk     x     Side bending   x   + pain B, R > L  Lateral hip pain   Pelvic Height x         Fwd Bend- (aberrant juttering or innominate mvmt)   x   + pain at 10 deg, reports tightness   Extension x         Trendelenburg x         Kemps/Quadrant     x     Stork   x   + hypomobility   SLS/SLS w rotation   x   + joint pain B   Roxy sign   x   + pain B  (no pain when in sitting)               Seated Exam Normal Abnormal N/A Comments   Pelvic Height x         Seated Rotation   x   Significant restriction in R rotation   Seated flexion x         B hip IR   x   + for tightness                           Supine Exam Normal Abnormal N/A Comments   Hip flexion x         Abduction   x   + tightness   ER   x   Mild tightness   IR   x   Significant tightness, pain   SHAYAN/Kris   x   + for mild pain   FADIR   x   + B   Hip scour x         SLR x         Crossed SLR x         Supine to sit           SI distraction/ compression   x   + generalized LBP with compression   Hip thrust     x     Leg length       R 36 in  L 35.5 in               Prone Exam Normal Abnormal N/A Comments   Prone knee bend   x   R leg long to even   Prone hip IR     x Unable to tolerate prone knee bend position long enough to do test   B Achilles reflex/Pheasant     x     PA/Spring   x   + pain T10-L5   Prone Instability test     x     Sacral Spring/thrust   x   + pain base and borders                              ROM LEFT RIGHT Comments   Lumbar Flex     Limited d/t pain   Lumbar Ext     WFL   Side Bend Limited by 25% Limited by 50% + pain, R > L   Rotation WFL Limited by 75% +pain on R with R rotation, increased with overpressure                          ROM LEFT RIGHT Comments   Hip Flexion 135 125     Hip Abd 25 20 *Unable to maintain true ABD B, pt immediately starts to ER   Hip ER 40 33     Hip IR 20 10     Hip Extension 5 5     Knee Ext 0 0     Knee Flex 130 126     Hamstring Flex SLR 65 deg SLR 60 deg                                      Strength LEFT RIGHT Comments   Multifidus     Not assessed   Transverse Ab     Not assessed   Hip Flexors 4+/5 4+/5 Increased posterior pain  + groin discomfort   Hip Abductors 4+/5 4+/5     Hip Extensors 4+/5 4+/5     Seated hip ER 4+/5 4+/5     Seated hip IR 4+/5 4+/5     Knee flex 4+/5 4+/5     Knee ext 4+/5 4+/5                            Myotomes Normal Abnormal Comments   Hip flexion (L1-L2) x       Knee extension (L2-L4) x       Dorsiflexion (L4-L5) x       Great Toe Ext (L5) x       Ankle Eversion (S1-S2) x       Ankle PF(S1-S2) x             Dermatomes Normal Abnormal Comments   inguinal area (L1)  x       anterior mid-thigh (L2) x       distal ant thigh/med knee (L3) x       medial lower leg and foot (L4) x       lateral lower leg and foot (L5) x       posterior calf (S1) x       medial calcaneus (S2) x             Neural dynamic tension testing Normal Abnormal Comments   Slump Test  - Degree of knee flexion:  x       SLR          0-30 x       30-70   x + for tightness   Femoral nerve (L2-4) x          Not assessed  Reflexes Normal Abnormal Comments   S1-2 Seated achilles         S1-2 Prone knee bend         L3-4 Patellar tendon         C5-6 Biceps         C6 Brachioradialis         C7-8 Triceps         Clonus         Babinski         Mckeon's            Joint mobility: Decreased B hip; decreased PA mobility lower thoracic and lumbar              []Normal               [x]Hypo              []Hyper     Palpation: SP of T10-L5, Based and sides of sacrum, lumbar paraspinals, glute med/max/piriformis, proximal bicep femoris     Functional Mobility/Transfers: Independent     Posture: Rounded shoulders     Gait: + B out toeing    RESTRICTIONS/PRECAUTIONS:     Exercises/Interventions:     Exercise/Equipment Resistance/Repetitions Other comments   WARM UP-Airdyne 5' before DN         Stretching     Hamstring     Hip Flexion     ITB     Grion     Quad     Foam roll    HEP    Figure 4 3x30\" B    Trunk rotation  Top knee supported on foam roll   Child's pose 3x30\" B    Knee to opposite shoulder 3x30\" B         SLR     Supine     Prone     Abduction     Adducton     SLR+          Core     TA brace 10x10\"    Hooklying heel lift 2x10          Pallof Press 5x10\" B, black                             CKC     Calf raises     Wall sits     Step ups     1 leg stand     Squatting     CC TKE     Balance     Bridges 10x10\" DL, ball between knees    Clamshells 3x10 blue loop, B         PRE     Extension  RANGE:   Flexion  RANGE: Cable Column          Leg Press  RANGE:        Bike     Treadmill            Plan for next session: progress as tolerated    Patient Education:  5/2/19 Pt educated on diagnosis, prognosis and PT plan of care. Educated on [address]. Pt questions were addressed and answered. Therapeutic Exercise and NMR EXR:  [x] (05757) Provided verbal/tactile cueing for activities related to strengthening, flexibility, endurance, ROM for improvements in LE, proximal hip, and core control with self care, mobility, lifting, ambulation.  [] (63541) Provided verbal/tactile cueing for activities related to improving balance, coordination, kinesthetic sense, posture, motor skill, proprioception  to assist with LE, proximal hip, and core control in self care, mobility, lifting, ambulation and eccentric single leg control.      NMR and Therapeutic Activities:    [] (45875 or 25731) Provided verbal/tactile cueing for activities related to improving balance, coordination, kinesthetic sense, posture, motor skill, proprioception and motor activation to allow for proper function of core, proximal hip and LE with self care and ADLs  [] (58486) Gait Re-education- Provided training and instruction to the patient for proper LE, core and proximal hip recruitment and positioning and eccentric body weight control with ambulation re-education including up and down stairs     Home Exercise Program:    [x] (17494) Reviewed/Progressed HEP activities related to strengthening, flexibility, endurance, ROM of core, proximal hip and LE for functional self-care, mobility, lifting and ambulation/stair navigation   [] (57719)Reviewed/Progressed HEP activities related to improving balance, coordination, kinesthetic sense, posture, motor skill, proprioception of core, proximal hip and LE for self care, mobility, lifting, and ambulation/stair navigation      Manual Treatments:      Paraspinal and glute stretching  [x] (32724) Provided manual therapy to mobilize LE, weeks  1. Independent in HEP and progression per patient tolerance, in order to prevent re-injury. 2. Patient will have a decrease in pain to facilitate improvement in movement, function, and ADLs as indicated by Functional Deficits.     Long Term Goals: To be achieved in: 6 weeks  1. Disability index score of 0% for the JENNIFER to assist with reaching prior level of function. 2. Patient will demonstrate increased AROM to WNL, good LS mobility, good hip ROM to allow for proper joint functioning as indicated by patients Functional Deficits. 3. Patient will tolerate walking for >1 hour without increased symptoms or restriction. 4. Patient will return to ADLs, IADLs and functional activities without increased symptoms or restriction. 5. Patient will tolerate progressive return to running. Progression Towards Functional goals:  [] Patient is progressing as expected towards functional goals listed. [] Progression is slowed due to complexities listed. [] Progression has been slowed due to co-morbidities. [x] Plan just implemented, too soon to assess goals progression  [] Other:     ASSESSMENT:  Completed dry needling to R glutes this date. Pt reported that his R hip felt looser during stretching following the dry needling compared to how he felt before, he also reported a noticeable difference with how his R and L side felt during the stretching. Pt tolerated exercise program well, reported mild tightness by completion of bridges, improved TA brace but continues to compensate with RF activation and pelvic tilt. Pt required cueing to \"hollow\" stomach during TA brace and to keep glutes relaxed. Pt challenged by pallof press, reported that he felt tightness in back and glute by completion as he could tell he was compensating with posterior muscles d/t weaker core. Pt to focus on TA brace and hooklying heel lifts at home, paloff press not added to HEP until core recruitment improves. Continue DN to tolerance.

## 2019-05-30 ENCOUNTER — HOSPITAL ENCOUNTER (OUTPATIENT)
Dept: PHYSICAL THERAPY | Age: 69
Setting detail: THERAPIES SERIES
Discharge: HOME | End: 2019-05-30
Payer: MEDICARE

## 2019-05-30 PROCEDURE — 97150 GROUP THERAPEUTIC PROCEDURES: CPT | Performed by: PHYSICAL THERAPIST

## 2019-05-30 PROCEDURE — 97112 NEUROMUSCULAR REEDUCATION: CPT | Performed by: PHYSICAL THERAPIST

## 2019-05-30 PROCEDURE — 97140 MANUAL THERAPY 1/> REGIONS: CPT

## 2019-05-30 NOTE — FLOWSHEET NOTE
The [address] [place] [address] [facility]    Physical Therapy Daily Treatment Note  Date:  2019    Patient Name:  Yuriy Flores    :  1950  MRN: [id number]  Restrictions/Precautions:    Medical/Treatment Diagnosis Information:  · Diagnosis: M54.5 (ICD-10-CM) - Bilateral low back pain without sciatica, unspecified chronicity; G57.01, G57.02 (ICD-10-CM) - Piriformis syndrome of both sides;M70.61 (ICD-10-CM) - Trochanteric bursitis of right hip  · Treatment Diagnosis: M48.06, M54.5, M25.651, M25.562, M25.551, M25.552; pain and tightness leading to impaired activity participation  Insurance/Certification information:  PT Insurance Information: MEDICARE  Physician Information:  Referring Practitioner: Nate Castillo MD  Plan of care signed (Y/N):     Date of Patient follow up with Physician: as needed    G-Code (if applicable):         JENNIFER  19 score =10% deficit    Progress Note: [x]  Yes  []  No  Next due by: Visit #10       Latex Allergy:  [x]NO      []YES  Preferred Language for Healthcare:   [x]English       []other:    Visit # Insurance Allowable   5 MEDICARE     Pain level:  2/10     SUBJECTIVE:  Pt states that he could notice a positive difference from the dry needling, could notice a difference between sides with only the R side being needled. Pt feels like the back is feeling better, but he is noticing more tightness through his HS. Carried some bags of mulch and noted tightness in back of legs but back felt okay.     OBJECTIVE:         Standing Exam Normal Abnormal N/A Comments   Toe walk       x     Heel Walk     x     Side bending   x   + pain B, R > L  Lateral hip pain   Pelvic Height x         Fwd Bend- (aberrant juttering or innominate mvmt)   x   + pain at 10 deg, reports tightness   Extension x         Trendelenburg x         Kemps/Quadrant     x     Stork   x   + hypomobility   SLS/SLS w rotation   x   + joint pain B   Roxy sign   x   + pain B  (no and pt can perform without LB/glute discomfort                            CKC     Calf raises     Wall sits     Step ups     1 leg stand     Squatting     CC TKE     Balance     Bridges    Clamshells         PRE     Extension  RANGE:   Flexion  RANGE:        Cable Column          Leg Press  RANGE:        Bike     Treadmill            Plan for next session: progress as tolerated    Patient Education:  5/2/19 Pt educated on diagnosis, prognosis and PT plan of care. Educated on [employer]. Pt questions were addressed and answered. Therapeutic Exercise and NMR EXR:  [x] (85566) Provided verbal/tactile cueing for activities related to strengthening, flexibility, endurance, ROM for improvements in LE, proximal hip, and core control with self care, mobility, lifting, ambulation.  [] (89776) Provided verbal/tactile cueing for activities related to improving balance, coordination, kinesthetic sense, posture, motor skill, proprioception  to assist with LE, proximal hip, and core control in self care, mobility, lifting, ambulation and eccentric single leg control.      NMR and Therapeutic Activities:    [] (58985 or 70661) Provided verbal/tactile cueing for activities related to improving balance, coordination, kinesthetic sense, posture, motor skill, proprioception and motor activation to allow for proper function of core, proximal hip and LE with self care and ADLs  [] (63451) Gait Re-education- Provided training and instruction to the patient for proper LE, core and proximal hip recruitment and positioning and eccentric body weight control with ambulation re-education including up and down stairs     Home Exercise Program:  Updated 5/30/19, printed handout given    [x] (53111) Reviewed/Progressed HEP activities related to strengthening, flexibility, endurance, ROM of core, proximal hip and LE for functional self-care, mobility, lifting and ambulation/stair navigation   [] (72760)Reviewed/Progressed HEP activities related to improving balance, coordination, kinesthetic sense, posture, motor skill, proprioception of core, proximal hip and LE for self care, mobility, lifting, and ambulation/stair navigation      Manual Treatments:        [x] (10202) Provided manual therapy to mobilize LE, proximal hip and/or LS spine soft tissue/joints for the purpose of modulating pain, promoting relaxation,  increasing ROM, reducing/eliminating soft tissue swelling/inflammation/restriction, improving soft tissue extensibility and allowing for proper ROM for normal function with self care, mobility, lifting and ambulation. PT reviewed precautions, contraindications, and indications with pt in addition to application of dry needling within established plan of care and expected outcomes; pt verbalized understanding with all questions answered. Pt had signed consent form for dry needling and PT obtained written consent with updated plan of care from MD before beginning. Dry needling manual therapy: consisted on the placement of 5 needles in the following muscles:  R gluteus natalie and piriformis. A 60 mm needle was inserted, piston, rotated, and coned to produce intramuscular mobilization. These techniques were used to restore functional range of motion, reduce muscle spasm and induce healing in the corresponding musculature. (59961)  Clean Technique was utilized today while applying Dry needling treatment. The treatment sites where cleaned with 70% solution of  isopropyl alcohol . The PT washed their hands and utilized treatment gloves along with hand  prior to inserting the needles. All needles where removed and discarded in the appropriate sharps container.       Performed by Linnea Dominguez, PT, DPT  15 min  5/30     Modalities:     Charges:  Timed Code Treatment Minutes: 39   Total Treatment Minutes: 72   Time in: 8:30  Time out: 9:42    [] EVAL  [] AA(98675) x      [] IONTO  [x] NMR (84785) x  1   [] VASO  [x] Manual (39364) x  1 limited by fatique  [] Patient limited by pain  [] Patient limited by other medical complications  [x] Other: 5/30  Good tolerance to dry needling this session. Multiple muscle twitches noted in R and L piriformis, gluteus natalie, and R proximal insertion of hamstring. No ill side effects noted. Monitor response next session. Prognosis: [x] Good [] Fair  [] Poor    Patient Requires Follow-up: [x] Yes  [] No    PLAN: See eval  [x] Continue per plan of care [] Alter current plan (see comments)  [] Plan of care initiated [] Hold pending MD visit [] Discharge    Electronically signed by: Georjean Scheuermann PT, DPT  Physical Therapist  [id number]  [email or web address]    *Note: If patient does not return for scheduled / recommended follow up visit, this note will serve as their discharge note from physical therapy.

## 2019-06-04 ENCOUNTER — HOSPITAL ENCOUNTER (OUTPATIENT)
Dept: PHYSICAL THERAPY | Age: 69
Setting detail: THERAPIES SERIES
Discharge: HOME | End: 2019-06-04
Payer: MEDICARE

## 2019-06-04 DIAGNOSIS — K21.9 GASTROESOPHAGEAL REFLUX DISEASE WITHOUT ESOPHAGITIS: Primary | ICD-10-CM

## 2019-06-04 DIAGNOSIS — I10 ESSENTIAL HYPERTENSION: ICD-10-CM

## 2019-06-04 PROCEDURE — 97110 THERAPEUTIC EXERCISES: CPT

## 2019-06-04 PROCEDURE — 97140 MANUAL THERAPY 1/> REGIONS: CPT

## 2019-06-04 RX ORDER — RANITIDINE 150 MG/1
150 TABLET ORAL 2 TIMES DAILY
Qty: 180 TABLET | Refills: 0 | Status: SHIPPED | OUTPATIENT
Start: 2019-06-04 | End: 2019-06-14 | Stop reason: SDUPTHER

## 2019-06-04 RX ORDER — LISINOPRIL 10 MG/1
10 TABLET ORAL DAILY
Qty: 90 TABLET | Refills: 0 | Status: SHIPPED | OUTPATIENT
Start: 2019-06-04 | End: 2019-06-24 | Stop reason: SDUPTHER

## 2019-06-04 NOTE — FLOWSHEET NOTE
The [address] [place] [address] [facility]    Physical Therapy Daily Treatment Note  Date:  2019    Patient Name:  Mabel Natarajan    :  1950  MRN: [id number]  Restrictions/Precautions:    Medical/Treatment Diagnosis Information:  · Diagnosis: M54.5 (ICD-10-CM) - Bilateral low back pain without sciatica, unspecified chronicity; G57.01, G57.02 (ICD-10-CM) - Piriformis syndrome of both sides;M70.61 (ICD-10-CM) - Trochanteric bursitis of right hip  · Treatment Diagnosis: M48.06, M54.5, M25.651, M25.562, M25.551, M25.552; pain and tightness leading to impaired activity participation  Insurance/Certification information:  PT Insurance Information: MEDICARE  Physician Information:  Referring Practitioner: Sherlyn Gill MD  Plan of care signed (Y/N):     Date of Patient follow up with Physician: as needed    G-Code (if applicable):         JENNIFER  19 score =10% deficit    Progress Note: [x]  Yes  []  No  Next due by: Visit #10       Latex Allergy:  [x]NO      []YES  Preferred Language for Healthcare:   [x]English       []other:    Visit # Insurance Allowable   6 MEDICARE     Pain level:  0/10 tightness present       SUBJECTIVE:   Patient states that he is feeling better but he has noticed that his hamstrings are very tight. He states that he has noticed he has felt it more on the outside of his hip and less in his glutes. He states that as the day progresses he gets tighter.      OBJECTIVE:         Standing Exam Normal Abnormal N/A Comments   Toe walk       x     Heel Walk     x     Side bending   x   + pain B, R > L  Lateral hip pain   Pelvic Height x         Fwd Bend- (aberrant juttering or innominate mvmt)   x   + pain at 10 deg, reports tightness   Extension x         Trendelenburg x         Kemps/Quadrant     x     Stork   x   + hypomobility   SLS/SLS w rotation   x   + joint pain B   Roxy sign   x   + pain B  (no pain when in sitting)               Seated Exam Normal Abnormal N/A Comments   Pelvic Height x         Seated Rotation   x   Significant restriction in R rotation   Seated flexion x         B hip IR   x   + for tightness                           Supine Exam Normal Abnormal N/A Comments   Hip flexion x         Abduction   x   + tightness   ER   x   Mild tightness   IR   x   Significant tightness, pain   SHAYAN/Kris   x   + for mild pain   FADIR   x   + B   Hip scour x         SLR x         Crossed SLR x         Supine to sit           SI distraction/ compression   x   + generalized LBP with compression   Hip thrust     x     Leg length       R 36 in  L 35.5 in               Prone Exam Normal Abnormal N/A Comments   Prone knee bend   x   R leg long to even   Prone hip IR     x Unable to tolerate prone knee bend position long enough to do test   B Achilles reflex/Pheasant     x     PA/Spring   x   + pain T10-L5   Prone Instability test     x     Sacral Spring/thrust   x   + pain base and borders                              ROM LEFT RIGHT Comments   Lumbar Flex     Limited d/t pain   Lumbar Ext     WFL   Side Bend Limited by 25% Limited by 50% + pain, R > L   Rotation WFL Limited by 75% +pain on R with R rotation, increased with overpressure                          ROM LEFT RIGHT Comments   Hip Flexion 135 125     Hip Abd 25 20 *Unable to maintain true ABD B, pt immediately starts to ER   Hip ER 40 33     Hip IR 20 10     Hip Extension 5 5     Knee Ext 0 0     Knee Flex 130 126     Hamstring Flex SLR 65 deg SLR 60 deg                                      Strength LEFT RIGHT Comments   Multifidus     Not assessed   Transverse Ab     Not assessed   Hip Flexors 4+/5 4+/5 Increased posterior pain  + groin discomfort   Hip Abductors 4+/5 4+/5     Hip Extensors 4+/5 4+/5     Seated hip ER 4+/5 4+/5     Seated hip IR 4+/5 4+/5     Knee flex 4+/5 4+/5     Knee ext 4+/5 4+/5                            Myotomes Normal Abnormal Comments   Hip flexion (L1-L2) x       Knee extension (L2-L4) x       Dorsiflexion (L4-L5) x       Great Toe Ext (L5) x       Ankle Eversion (S1-S2) x       Ankle PF(S1-S2) x             Dermatomes Normal Abnormal Comments   inguinal area (L1)  x       anterior mid-thigh (L2) x       distal ant thigh/med knee (L3) x       medial lower leg and foot (L4) x       lateral lower leg and foot (L5) x       posterior calf (S1) x       medial calcaneus (S2) x             Neural dynamic tension testing Normal Abnormal Comments   Slump Test  - Degree of knee flexion:  x       SLR          0-30 x       30-70   x + for tightness   Femoral nerve (L2-4) x          Not assessed  Reflexes Normal Abnormal Comments   S1-2 Seated achilles         S1-2 Prone knee bend         L3-4 Patellar tendon         C5-6 Biceps         C6 Brachioradialis         C7-8 Triceps         Clonus         Babinski         Mckeon's            Joint mobility: Decreased B hip; decreased PA mobility lower thoracic and lumbar              []Normal               [x]Hypo              []Hyper     Palpation: SP of T10-L5, Based and sides of sacrum, lumbar paraspinals, glute med/max/piriformis, proximal bicep femoris     Functional Mobility/Transfers: Independent     Posture: Rounded shoulders     Gait: + B out toeing    RESTRICTIONS/PRECAUTIONS:     Exercises/Interventions:     Exercise/Equipment Resistance/Repetitions Other comments   WARM UP-Airdyne 5' before DN         Stretching     Hamstring 3x30\" supine with strap, B    Hip Flexion     ITB     Grion     Quad     Foam roll    HEP    Figure 4 3x30\" B    Trunk rotation  Top knee supported on foam roll   Child's pose 3x30\" B    Knee to opposite shoulder 3x30\" B              SLR     Supine     Prone     Abduction     Adducton     SLR+          Core                           Hold until core strength improves and pt can perform without LB/glute discomfort                            CKC     Calf raises     Wall sits     Step ups     1 leg stand     Squatting CC TKE     Balance     Bridges    Clamshells         PRE     Extension  RANGE:   Flexion  RANGE:        Cable Column          Leg Press  RANGE:        Bike     Treadmill            Plan for next session: progress as tolerated    Patient Education:  5/2/19 Pt educated on diagnosis, prognosis and PT plan of care. Educated on [employer]. Pt questions were addressed and answered. Therapeutic Exercise and NMR EXR:  [x] (77768) Provided verbal/tactile cueing for activities related to strengthening, flexibility, endurance, ROM for improvements in LE, proximal hip, and core control with self care, mobility, lifting, ambulation.  [] (16211) Provided verbal/tactile cueing for activities related to improving balance, coordination, kinesthetic sense, posture, motor skill, proprioception  to assist with LE, proximal hip, and core control in self care, mobility, lifting, ambulation and eccentric single leg control.      NMR and Therapeutic Activities:    [] (53693 or 79617) Provided verbal/tactile cueing for activities related to improving balance, coordination, kinesthetic sense, posture, motor skill, proprioception and motor activation to allow for proper function of core, proximal hip and LE with self care and ADLs  [] (74284) Gait Re-education- Provided training and instruction to the patient for proper LE, core and proximal hip recruitment and positioning and eccentric body weight control with ambulation re-education including up and down stairs     Home Exercise Program:  Updated 5/30/19, printed handout given    [x] (96129) Reviewed/Progressed HEP activities related to strengthening, flexibility, endurance, ROM of core, proximal hip and LE for functional self-care, mobility, lifting and ambulation/stair navigation   [] (38576)Reviewed/Progressed HEP activities related to improving balance, coordination, kinesthetic sense, posture, motor skill, proprioception of core, proximal hip and LE for self care, mobility, lifting, and ambulation/stair navigation      Manual Treatments:         IASTM Bilateral Hamstring 15 min 6/4  [x] (06183) Provided manual therapy to mobilize LE, proximal hip and/or LS spine soft tissue/joints for the purpose of modulating pain, promoting relaxation,  increasing ROM, reducing/eliminating soft tissue swelling/inflammation/restriction, improving soft tissue extensibility and allowing for proper ROM for normal function with self care, mobility, lifting and ambulation. PT reviewed precautions, contraindications, and indications with pt in addition to application of dry needling within established plan of care and expected outcomes; pt verbalized understanding with all questions answered. Pt had signed consent form for dry needling and PT obtained written consent with updated plan of care from MD before beginning. Dry needling manual therapy: consisted on the placement of 5 needles in the following muscles:  R gluteus natalie and piriformis. A 60 mm needle was inserted, piston, rotated, and coned to produce intramuscular mobilization. These techniques were used to restore functional range of motion, reduce muscle spasm and induce healing in the corresponding musculature. (76156)  Clean Technique was utilized today while applying Dry needling treatment. The treatment sites where cleaned with 70% solution of  isopropyl alcohol . The PT washed their hands and utilized treatment gloves along with hand  prior to inserting the needles. All needles where removed and discarded in the appropriate sharps container.       Performed by Tia Galvan, PT, DPT  15 min  6/4    Modalities:     Charges:  Timed Code Treatment Minutes: 53   Total Treatment Minutes: 66   Time in: 11:01  Time out: 12:07     [] EVAL  [x] FB(08124) x  1   [] IONTO  [] NMR (30363) x      [] VASO  [x] Manual (13483) x  3    [] Other:   [] TA x       [] Mech Traction (25242)  [] ES(attended) (85196)      [] ES (un) (76488):     GOALS:

## 2019-06-11 ENCOUNTER — HOSPITAL ENCOUNTER (OUTPATIENT)
Dept: PHYSICAL THERAPY | Age: 69
Setting detail: THERAPIES SERIES
Discharge: HOME | End: 2019-06-11
Payer: MEDICARE

## 2019-06-11 PROCEDURE — 97110 THERAPEUTIC EXERCISES: CPT

## 2019-06-11 PROCEDURE — 97140 MANUAL THERAPY 1/> REGIONS: CPT

## 2019-06-11 NOTE — FLOWSHEET NOTE
[facility]    Physical Therapy Daily Treatment Note  Date:  2019    Patient Name:  Bari Jones    :  1950  MRN: [id number]  Restrictions/Precautions:    Medical/Treatment Diagnosis Information:  · Diagnosis: M54.5 (ICD-10-CM) - Bilateral low back pain without sciatica, unspecified chronicity; G57.01, G57.02 (ICD-10-CM) - Piriformis syndrome of both sides;M70.61 (ICD-10-CM) - Trochanteric bursitis of right hip  · Treatment Diagnosis: M48.06, M54.5, M25.651, M25.562, M25.551, M25.552; pain and tightness leading to impaired activity participation  Insurance/Certification information:  PT Insurance Information: MEDICARE  Physician Information:  Referring Practitioner: Janeth Gaitan MD  Plan of care signed (Y/N):     Date of Patient follow up with Physician: as needed    G-Code (if applicable):         JENNIFER  19 score 50=10% deficit    Progress Note: [x]  Yes  []  No  Next due by: Visit #10       Latex Allergy:  [x]NO      []YES  Preferred Language for Healthcare:   [x]English       []other:    Visit # Insurance Allowable   7 MEDICARE     Pain level:  0/10 tightness present       SUBJECTIVE:   Patient states that he is doing okay. He states that he had a lot of tightness over the weekend. He states that he stood a lot and thinks that may have been the contributing factor. He states that he felt good after his last session and thinks he is getting some relief from the needling.      OBJECTIVE:         Standing Exam Normal Abnormal N/A Comments   Toe walk       x     Heel Walk     x     Side bending   x   + pain B, R > L  Lateral hip pain   Pelvic Height x         Fwd Bend- (aberrant juttering or innominate mvmt)   x   + pain at 10 deg, reports tightness   Extension x         Trendelenburg x         Kemps/Quadrant     x     Stork   x   + hypomobility   SLS/SLS w rotation   x   + joint pain B   Roxy sign   x   + pain B  (no pain when in sitting)               Seated Exam Normal Abnormal N/A Comments   Pelvic Height x         Seated Rotation   x   Significant restriction in R rotation   Seated flexion x         B hip IR   x   + for tightness                           Supine Exam Normal Abnormal N/A Comments   Hip flexion x         Abduction   x   + tightness   ER   x   Mild tightness   IR   x   Significant tightness, pain   SHAYAN/Kris   x   + for mild pain   FADIR   x   + B   Hip scour x         SLR x         Crossed SLR x         Supine to sit           SI distraction/ compression   x   + generalized LBP with compression   Hip thrust     x     Leg length       R 36 in  L 35.5 in               Prone Exam Normal Abnormal N/A Comments   Prone knee bend   x   R leg long to even   Prone hip IR     x Unable to tolerate prone knee bend position long enough to do test   B Achilles reflex/Pheasant     x     PA/Spring   x   + pain T10-L5   Prone Instability test     x     Sacral Spring/thrust   x   + pain base and borders                              ROM LEFT RIGHT Comments   Lumbar Flex     Limited d/t pain   Lumbar Ext     WFL   Side Bend Limited by 25% Limited by 50% + pain, R > L   Rotation WFL Limited by 75% +pain on R with R rotation, increased with overpressure                          ROM LEFT RIGHT Comments   Hip Flexion 135 125     Hip Abd 25 20 *Unable to maintain true ABD B, pt immediately starts to ER   Hip ER 40 33     Hip IR 20 10     Hip Extension 5 5     Knee Ext 0 0     Knee Flex 130 126     Hamstring Flex SLR 65 deg SLR 60 deg                                      Strength LEFT RIGHT Comments   Multifidus     Not assessed   Transverse Ab     Not assessed   Hip Flexors 4+/5 4+/5 Increased posterior pain  + groin discomfort   Hip Abductors 4+/5 4+/5     Hip Extensors 4+/5 4+/5     Seated hip ER 4+/5 4+/5     Seated hip IR 4+/5 4+/5     Knee flex 4+/5 4+/5     Knee ext 4+/5 4+/5                            Myotomes Normal Abnormal Comments   Hip flexion (L1-L2) x       Knee extension (L2-L4) x       Dorsiflexion (L4-L5) x       Great Toe Ext (L5) x       Ankle Eversion (S1-S2) x       Ankle PF(S1-S2) x             Dermatomes Normal Abnormal Comments   inguinal area (L1)  x       anterior mid-thigh (L2) x       distal ant thigh/med knee (L3) x       medial lower leg and foot (L4) x       lateral lower leg and foot (L5) x       posterior calf (S1) x       medial calcaneus (S2) x             Neural dynamic tension testing Normal Abnormal Comments   Slump Test  - Degree of knee flexion:  x       SLR          0-30 x       30-70   x + for tightness   Femoral nerve (L2-4) x          Not assessed  Reflexes Normal Abnormal Comments   S1-2 Seated achilles         S1-2 Prone knee bend         L3-4 Patellar tendon         C5-6 Biceps         C6 Brachioradialis         C7-8 Triceps         Clonus         Babinski         Mckeon's            Joint mobility: Decreased B hip; decreased PA mobility lower thoracic and lumbar              []Normal               [x]Hypo              []Hyper     Palpation: SP of T10-L5, Based and sides of sacrum, lumbar paraspinals, glute med/max/piriformis, proximal bicep femoris     Functional Mobility/Transfers: Independent     Posture: Rounded shoulders     Gait: + B out toeing    RESTRICTIONS/PRECAUTIONS:     Exercises/Interventions:     Exercise/Equipment Resistance/Repetitions Other comments   WARM UP-Airdyne 5' before DN         Stretching     Hamstring 5x30\" supine with strap, B    Hip Flexion     ITB     Grion     Quad     Foam roll    HEP    Figure 4 3x30\" B    Trunk rotation  Top knee supported on foam roll   Child's pose 3x30\" B    Knee to opposite shoulder 3x30\" B              SLR     Supine     Prone     Abduction     Adducton     SLR+          Core                           Hold until core strength improves and pt can perform without LB/glute discomfort                            CKC     Calf raises     Wall sits Step ups     1 leg stand     Squatting     CC TKE     Balance     Bridges    Clamshells         PRE     Extension  RANGE:   Flexion  RANGE:        Cable Column          Leg Press  RANGE:        Bike     Treadmill            Plan for next session: progress as tolerated    Patient Education:  5/2/19 Pt educated on diagnosis, prognosis and PT plan of care. Educated on [address]. Pt questions were addressed and answered. Therapeutic Exercise and NMR EXR:  [x] (88785) Provided verbal/tactile cueing for activities related to strengthening, flexibility, endurance, ROM for improvements in LE, proximal hip, and core control with self care, mobility, lifting, ambulation.  [] (65622) Provided verbal/tactile cueing for activities related to improving balance, coordination, kinesthetic sense, posture, motor skill, proprioception  to assist with LE, proximal hip, and core control in self care, mobility, lifting, ambulation and eccentric single leg control.      NMR and Therapeutic Activities:    [] (99828 or 13765) Provided verbal/tactile cueing for activities related to improving balance, coordination, kinesthetic sense, posture, motor skill, proprioception and motor activation to allow for proper function of core, proximal hip and LE with self care and ADLs  [] (21658) Gait Re-education- Provided training and instruction to the patient for proper LE, core and proximal hip recruitment and positioning and eccentric body weight control with ambulation re-education including up and down stairs     Home Exercise Program:  Updated 5/30/19, printed handout given    [x] (89423) Reviewed/Progressed HEP activities related to strengthening, flexibility, endurance, ROM of core, proximal hip and LE for functional self-care, mobility, lifting and ambulation/stair navigation   [] (29807)Reviewed/Progressed HEP activities related to improving balance, coordination, kinesthetic sense, posture, motor skill, proprioception of core, proximal hip (57968)      [] ES (un) (30065):     GOALS:   Patient stated goal: Get back to walking and running, movement without pain     Therapist goals for Patient:   Short Term Goals: To be achieved in: 2 weeks  1. Independent in HEP and progression per patient tolerance, in order to prevent re-injury. 2. Patient will have a decrease in pain to facilitate improvement in movement, function, and ADLs as indicated by Functional Deficits.     Long Term Goals: To be achieved in: 6 weeks  1. Disability index score of 0% for the JENNIFER to assist with reaching prior level of function. 2. Patient will demonstrate increased AROM to WNL, good LS mobility, good hip ROM to allow for proper joint functioning as indicated by patients Functional Deficits. 3. Patient will tolerate walking for >1 hour without increased symptoms or restriction. 4. Patient will return to ADLs, IADLs and functional activities without increased symptoms or restriction. 5. Patient will tolerate progressive return to running. Progression Towards Functional goals:  [] Patient is progressing as expected towards functional goals listed. [] Progression is slowed due to complexities listed. [] Progression has been slowed due to co-morbidities. [x] Plan just implemented, too soon to assess goals progression  [] Other:     ASSESSMENT:  Pt overuses posterior chain to compensate for anterior chain weakness, therefore will hold bridges and clamshells at this time to decrease overload to posterior chain and shift PT focus to anterior chain/core activation and strength. Pt tolerated core program well, able to feel core staying engaged. Pt tolerate DN well, reported feeling looser afterwards and stretching felt better. Pt requires PT follow up to address ROM, strength and functional mobility deficits.     Treatment/Activity Tolerance:  [x] Patient tolerated treatment well [] Patient limited by fatique  [] Patient limited by pain  [] Patient limited by other medical complications  [x] Other: 6/11 Good tolerance to dry needling this session. Multiple muscle twitches noted in R and L gluteus natalie, and Bilateral proximal insertion of hamstrings and semitendinosus. No ill side effects noted. Patient reported feeling better at end of session. Monitor response next session. Prognosis: [x] Good [] Fair  [] Poor    Patient Requires Follow-up: [x] Yes  [] No    PLAN: See eval  [x] Continue per plan of care [] Alter current plan (see comments)  [] Plan of care initiated [] Hold pending MD visit [] Discharge    Electronically signed by: Tia Galvan PT, DPT      *Note: If patient does not return for scheduled / recommended follow up visit, this note will serve as their discharge note from physical therapy.

## 2019-06-14 DIAGNOSIS — K21.9 GASTROESOPHAGEAL REFLUX DISEASE WITHOUT ESOPHAGITIS: ICD-10-CM

## 2019-06-14 RX ORDER — RANITIDINE 150 MG/1
150 TABLET ORAL 2 TIMES DAILY
Qty: 180 TABLET | Refills: 1 | Status: SHIPPED | OUTPATIENT
Start: 2019-06-14 | End: 2019-06-24 | Stop reason: SDUPTHER

## 2019-06-20 ENCOUNTER — TELEPHONE (OUTPATIENT)
Dept: FAMILY MEDICINE CLINIC | Age: 69
End: 2019-06-20

## 2019-06-24 ENCOUNTER — OFFICE VISIT (OUTPATIENT)
Dept: FAMILY MEDICINE CLINIC | Age: 69
End: 2019-06-24
Payer: MEDICARE

## 2019-06-24 VITALS
TEMPERATURE: 96.7 F | RESPIRATION RATE: 12 BRPM | HEART RATE: 56 BPM | DIASTOLIC BLOOD PRESSURE: 80 MMHG | BODY MASS INDEX: 32.08 KG/M2 | OXYGEN SATURATION: 98 % | SYSTOLIC BLOOD PRESSURE: 135 MMHG | WEIGHT: 218.8 LBS

## 2019-06-24 DIAGNOSIS — M76.31 ILIOTIBIAL BAND SYNDROME OF BOTH SIDES: ICD-10-CM

## 2019-06-24 DIAGNOSIS — E78.00 PURE HYPERCHOLESTEROLEMIA: ICD-10-CM

## 2019-06-24 DIAGNOSIS — K59.00 CONSTIPATION, UNSPECIFIED CONSTIPATION TYPE: ICD-10-CM

## 2019-06-24 DIAGNOSIS — M76.32 ILIOTIBIAL BAND SYNDROME OF BOTH SIDES: ICD-10-CM

## 2019-06-24 DIAGNOSIS — I25.10 CORONARY ARTERY DISEASE INVOLVING NATIVE CORONARY ARTERY OF NATIVE HEART WITHOUT ANGINA PECTORIS: ICD-10-CM

## 2019-06-24 DIAGNOSIS — M70.61 TROCHANTERIC BURSITIS, RIGHT HIP: ICD-10-CM

## 2019-06-24 DIAGNOSIS — M51.36 DDD (DEGENERATIVE DISC DISEASE), LUMBAR: ICD-10-CM

## 2019-06-24 DIAGNOSIS — E11.9 TYPE 2 DIABETES MELLITUS WITHOUT COMPLICATION, WITHOUT LONG-TERM CURRENT USE OF INSULIN (HCC): Primary | ICD-10-CM

## 2019-06-24 DIAGNOSIS — G57.03 PIRIFORMIS SYNDROME OF BOTH SIDES: ICD-10-CM

## 2019-06-24 DIAGNOSIS — F34.1 DYSTHYMIC DISORDER: ICD-10-CM

## 2019-06-24 LAB — HBA1C MFR BLD: 6.3 %

## 2019-06-24 PROCEDURE — G8417 CALC BMI ABV UP PARAM F/U: HCPCS | Performed by: FAMILY MEDICINE

## 2019-06-24 PROCEDURE — G8598 ASA/ANTIPLAT THER USED: HCPCS | Performed by: FAMILY MEDICINE

## 2019-06-24 PROCEDURE — G8427 DOCREV CUR MEDS BY ELIG CLIN: HCPCS | Performed by: FAMILY MEDICINE

## 2019-06-24 PROCEDURE — 2022F DILAT RTA XM EVC RTNOPTHY: CPT | Performed by: FAMILY MEDICINE

## 2019-06-24 PROCEDURE — 99214 OFFICE O/P EST MOD 30 MIN: CPT | Performed by: FAMILY MEDICINE

## 2019-06-24 PROCEDURE — 4040F PNEUMOC VAC/ADMIN/RCVD: CPT | Performed by: FAMILY MEDICINE

## 2019-06-24 PROCEDURE — 83036 HEMOGLOBIN GLYCOSYLATED A1C: CPT | Performed by: FAMILY MEDICINE

## 2019-06-24 PROCEDURE — 3017F COLORECTAL CA SCREEN DOC REV: CPT | Performed by: FAMILY MEDICINE

## 2019-06-24 PROCEDURE — 3044F HG A1C LEVEL LT 7.0%: CPT | Performed by: FAMILY MEDICINE

## 2019-06-24 PROCEDURE — 1123F ACP DISCUSS/DSCN MKR DOCD: CPT | Performed by: FAMILY MEDICINE

## 2019-06-24 PROCEDURE — 1036F TOBACCO NON-USER: CPT | Performed by: FAMILY MEDICINE

## 2019-06-24 RX ORDER — LANCETS 30 GAUGE
EACH MISCELLANEOUS
Qty: 100 EACH | Refills: 3 | Status: SHIPPED | OUTPATIENT
Start: 2019-06-24 | End: 2021-03-18

## 2019-06-24 RX ORDER — METHYLPREDNISOLONE 4 MG/1
TABLET ORAL
Qty: 1 KIT | Refills: 0 | Status: SHIPPED | OUTPATIENT
Start: 2019-06-24 | End: 2019-06-30

## 2019-06-24 RX ORDER — SERTRALINE HYDROCHLORIDE 25 MG/1
25 TABLET, FILM COATED ORAL DAILY
COMMUNITY
End: 2019-06-24

## 2019-06-24 RX ORDER — GLUCOSAMINE HCL/CHONDROITIN SU 500-400 MG
CAPSULE ORAL
Qty: 100 STRIP | Refills: 5 | Status: SHIPPED | OUTPATIENT
Start: 2019-06-24 | End: 2021-03-18

## 2019-06-24 NOTE — PROGRESS NOTES
76 y. o.. male  for follow up of diabetes, hypertension, and hypercholesterolemia and low back pain and buttock and hamstring pain. He states it is more buttock and hamstring pain. He is doing Physical Therapy still once per week . He was doing needling 1x/ week and has an appointment tomorrow at 8 am.  No weakness. Some radiation to ankles and knees. Pain is minimal now- 3-4/10 . Yesterday it flared with unpacking the car. At times flared with the trip ,but was doing some lifting , hauling. Diabetic Review of Systems - medication compliance: compliant most of the time, diabetic diet compliance: noncompliant some of the time, home glucose monitoring: fasting values range - not checking - does not have glucometer. , further diabetic ROS: no polyuria or polydipsia, no chest pain, dyspnea or TIA's, no numbness, tingling or pain in extremities, last eye exam approximately 1/19 . Some constipation X 3 weeks. He recently got back from a fishing trip- which he got back yesterday at 10 pm.  Last bowel movement was Friday . Current Outpatient Medications   Medication Sig Dispense Refill    blood glucose monitor kit and supplies Test 1 times a day & as needed for symptoms of irregular blood glucose. 1 kit 0    Lancets MISC Check Blood sugar 1x/ day 100 each 3    blood glucose monitor strips Test 1 times a day & as needed for symptoms of irregular blood glucose. 100 strip 5    methylPREDNISolone (MEDROL DOSEPACK) 4 MG tablet Take by mouth. 1 kit 0    clopidogrel (PLAVIX) 75 MG tablet TAKE 1 TABLET BY MOUTH EVERY DAY 90 tablet 3    lisinopril (PRINIVIL;ZESTRIL) 10 MG tablet TAKE 1 TABLET BY MOUTH DAILY 90 tablet 1    atorvastatin (LIPITOR) 20 MG tablet TAKE 1 TABLET BY MOUTH EVERY DAY 90 tablet 1    clotrimazole-betamethasone (LOTRISONE) 1-0.05 % cream Apply topically 2 times daily prn rash to back . Avoid using on face.  45 g 1    metoprolol succinate (TOPROL XL) 50 MG extended release tablet Take 1 tablet by mouth daily (Patient taking differently: Take 50 mg by mouth daily Indications: Pt is taking 25 mg ) 90 tablet 0    ranitidine (ZANTAC) 150 MG tablet TAKE 1 TABLET BY MOUTH TWICE DAILY 180 tablet 1    Probiotic Product (PROBIOTIC DAILY PO) Take 1 tablet by mouth daily      sildenafil (REVATIO) 20 MG tablet Take 1 - 5 pills po prn ED. 90 tablet 1    aspirin 325 MG tablet Take 325 mg by mouth daily.  therapeutic multivitamin-minerals (THERAGRAN-M) tablet Take 1 tablet by mouth daily.  Ascorbic Acid (VITAMIN C) 1000 MG tablet Take 1,000 mg by mouth daily.  vitamin B-12 (CYANOCOBALAMIN) 1000 MCG tablet Take 1,000 mcg by mouth daily.  Magnesium 500 MG TABS Take 1 tablet by mouth daily 750mg      fish oil-omega-3 fatty acids 1000 MG capsule Take 2 g by mouth daily.  Glucosamine-Chondroitin 750-600 MG TABS Take 1 tablet by mouth 2 times daily.  calcium carbonate 600 MG TABS tablet Take 1 tablet by mouth daily.  sertraline (ZOLOFT) 50 MG tablet TAKE 1 TABLET BY MOUTH DAILY (Patient taking differently: Take 50 mg by mouth daily Indications: Pt is taking 25 mg ) 90 tablet 1     No current facility-administered medications for this visit. ROS: All other systems were reviewed and were negative . Patient's allergies and medications were reviewed. Patient's past medical, surgical, social , and family history were reviewed. OBJECTIVE:  /80   Pulse 56   Temp 96.7 °F (35.9 °C) (Oral)   Resp 12   Wt 218 lb 12.8 oz (99.2 kg)   SpO2 98%   BMI 32.08 kg/m²   General: NAD, cooperative, alert and oriented X 3, affect / mood is good. Good insight. well hydrated. Neck : no lymphadenopathy, supple, FROM  CV: Regular rate and rhythm , no murmurs/ rub/ gallop. No edema. Lungs : CTA bilaterally, breathing comfortably  Abdomen: positive bowel sounds, soft , non tender, non distended. No hepatosplenomegaly.    Feet: normal sensation to monofilament bilaterally, no ulcers. DP/ PT pulses normal.   Skin: no rashes. Non tender. ASSESSMENT:  1. Type 2 diabetes mellitus without complication, without long-term current use of insulin ([facility])  - HgbA1c= 6.3 today , which is improved from 6.7 in 4/19.   - Continue dietary/ lifestyle modifications, including decreased concentrated sweets and carbohydrates. -  DIABETES FOOT EXAM  - blood glucose monitor kit and supplies; Test 1 times a day & as needed for symptoms of irregular blood glucose. Dispense: 1 kit; Refill: 0  - Lancets MISC; Check Blood sugar 1x/ day  Dispense: 100 each; Refill: 3  - blood glucose monitor strips; Test 1 times a day & as needed for symptoms of irregular blood glucose. Dispense: 100 strip; Refill: 5  - POCT glycosylated hemoglobin (Hb A1C)    2. Pure hypercholesterolemia  - controlled and continue Lipitor 20 mg qd and low cholesterol diet. 3. Coronary artery disease involving native coronary artery of native heart without angina pectoris  - continue Lipitor 20 mg qd and Metoprolol XL 50 mg qd. 4. DDD (degenerative disc disease), lumbar  - Moist heat . ROM exercises. Modify activities. 5. Dysthymic disorder  - stable. Continue Zoloft qd.     6. Iliotibial band syndrome of both sides  - Moist heat . ROM exercises. Modify activities. - avoid NSAIDs. - methylPREDNISolone (MEDROL DOSEPACK) 4 MG tablet; Take by mouth. Dispense: 1 kit; Refill: 0  - hold on Physical Therapy , but discussed. 7. Piriformis syndrome of both sides  - Moist heat . ROM exercises. Modify activities. - methylPREDNISolone (MEDROL DOSEPACK) 4 MG tablet; Take by mouth. Dispense: 1 kit; Refill: 0    8. Trochanteric bursitis, right hip  - Moist heat . ROM exercises. Modify activities. - methylPREDNISolone (MEDROL DOSEPACK) 4 MG tablet; Take by mouth. Dispense: 1 kit; Refill: 0    9. Constipation, unspecified constipation type  - push fluids - water.  Increased daily dietary fiber.   - Miralax qd for 1-2 days and

## 2019-06-25 ENCOUNTER — HOSPITAL ENCOUNTER (OUTPATIENT)
Dept: PHYSICAL THERAPY | Age: 69
Setting detail: THERAPIES SERIES
Discharge: HOME | End: 2019-06-25
Payer: MEDICARE

## 2019-06-25 PROCEDURE — 97110 THERAPEUTIC EXERCISES: CPT

## 2019-06-25 PROCEDURE — 97140 MANUAL THERAPY 1/> REGIONS: CPT

## 2019-06-25 NOTE — FLOWSHEET NOTE
[facility]    Physical Therapy Daily Treatment Note  Date:  2019    Patient Name:  Yanni Hurd    :  1950  MRN: [id number]  Restrictions/Precautions:    Medical/Treatment Diagnosis Information:  · Diagnosis: M54.5 (ICD-10-CM) - Bilateral low back pain without sciatica, unspecified chronicity; G57.01, G57.02 (ICD-10-CM) - Piriformis syndrome of both sides;M70.61 (ICD-10-CM) - Trochanteric bursitis of right hip  · Treatment Diagnosis: M48.06, M54.5, M25.651, M25.562, M25.551, M25.552; pain and tightness leading to impaired activity participation  Insurance/Certification information:  PT Insurance Information: MEDICARE  Physician Information:  Referring Practitioner: Rhonda Rivers MD  Plan of care signed (Y/N):     Date of Patient follow up with Physician: as needed    G-Code (if applicable):         JENNIFER  19 score =10% deficit    Progress Note: [x]  Yes  []  No  Next due by: Visit #10       Latex Allergy:  [x]NO      []YES  Preferred Language for Healthcare:   [x]English       []other:    Visit # Insurance Allowable   8 MEDICARE     Pain level:  5/10 tightness present       SUBJECTIVE:   Patient states that he is doing okay. He states that he had a rough week since he was gone last week on his trip. He states he saw his PCP yesterday to make sure there wasn't anything else going on.      OBJECTIVE:         Standing Exam Normal Abnormal N/A Comments   Toe walk       x     Heel Walk     x     Side bending   x   + pain B, R > L  Lateral hip pain   Pelvic Height x         Fwd Bend- (aberrant juttering or innominate mvmt)   x   + pain at 10 deg, reports tightness   Extension x         Trendelenburg x         Kemps/Quadrant     x     Stork   x   + hypomobility   SLS/SLS w rotation   x   + joint pain B   Roxy sign   x   + pain B  (no pain when in sitting)               Seated Exam Normal Abnormal N/A Comments   Pelvic Height x         Seated Rotation   x   Significant restriction in R rotation   Seated flexion x         B hip IR   x   + for tightness                           Supine Exam Normal Abnormal N/A Comments   Hip flexion x         Abduction   x   + tightness   ER   x   Mild tightness   IR   x   Significant tightness, pain   SHAYAN/Kris   x   + for mild pain   FADIR   x   + B   Hip scour x         SLR x         Crossed SLR x         Supine to sit           SI distraction/ compression   x   + generalized LBP with compression   Hip thrust     x     Leg length       R 36 in  L 35.5 in               Prone Exam Normal Abnormal N/A Comments   Prone knee bend   x   R leg long to even   Prone hip IR     x Unable to tolerate prone knee bend position long enough to do test   B Achilles reflex/Pheasant     x     PA/Spring   x   + pain T10-L5   Prone Instability test     x     Sacral Spring/thrust   x   + pain base and borders                              ROM LEFT RIGHT Comments   Lumbar Flex     Limited d/t pain   Lumbar Ext     WFL   Side Bend Limited by 25% Limited by 50% + pain, R > L   Rotation WFL Limited by 75% +pain on R with R rotation, increased with overpressure                          ROM LEFT RIGHT Comments   Hip Flexion 135 125     Hip Abd 25 20 *Unable to maintain true ABD B, pt immediately starts to ER   Hip ER 40 33     Hip IR 20 10     Hip Extension 5 5     Knee Ext 0 0     Knee Flex 130 126     Hamstring Flex SLR 65 deg SLR 60 deg                                      Strength LEFT RIGHT Comments   Multifidus     Not assessed   Transverse Ab     Not assessed   Hip Flexors 4+/5 4+/5 Increased posterior pain  + groin discomfort   Hip Abductors 4+/5 4+/5     Hip Extensors 4+/5 4+/5     Seated hip ER 4+/5 4+/5     Seated hip IR 4+/5 4+/5     Knee flex 4+/5 4+/5     Knee ext 4+/5 4+/5                            Myotomes Normal Abnormal Comments   Hip flexion (L1-L2) x       Knee extension (L2-L4) x       Dorsiflexion (L4-L5) x     Great Toe Ext (L5) x       Ankle Eversion (S1-S2) x       Ankle PF(S1-S2) x             Dermatomes Normal Abnormal Comments   inguinal area (L1)  x       anterior mid-thigh (L2) x       distal ant thigh/med knee (L3) x       medial lower leg and foot (L4) x       lateral lower leg and foot (L5) x       posterior calf (S1) x       medial calcaneus (S2) x             Neural dynamic tension testing Normal Abnormal Comments   Slump Test  - Degree of knee flexion:  x       SLR          0-30 x       30-70   x + for tightness   Femoral nerve (L2-4) x          Not assessed  Reflexes Normal Abnormal Comments   S1-2 Seated achilles         S1-2 Prone knee bend         L3-4 Patellar tendon         C5-6 Biceps         C6 Brachioradialis         C7-8 Triceps         Clonus         Babinski         Mckeon's            Joint mobility: Decreased B hip; decreased PA mobility lower thoracic and lumbar              []Normal               [x]Hypo              []Hyper     Palpation: SP of T10-L5, Based and sides of sacrum, lumbar paraspinals, glute med/max/piriformis, proximal bicep femoris     Functional Mobility/Transfers: Independent     Posture: Rounded shoulders     Gait: + B out toeing    RESTRICTIONS/PRECAUTIONS:     Exercises/Interventions:     Exercise/Equipment Resistance/Repetitions Other comments   WARM UP-Airdyne 5' before DN         Stretching     Hamstring 5x30\" supine with strap, B    Hip Flexion     ITB     Grion     Quad     Foam roll    HEP    Figure 4 3x30\" B    Trunk rotation  Top knee supported on foam roll   Child's pose 3x30\" B    Knee to opposite shoulder 3x30\" B              SLR     Supine     Prone     Abduction     Adducton     SLR+          Core                           Hold until core strength improves and pt can perform without LB/glute discomfort                            CKC     Calf raises     Wall sits     Step ups     1 leg stand     Squatting     CC TKE     Balance     Bridges    [employer] IASTM Bilateral Hamstring 15 min 6/4  [x] (04490) Provided manual therapy to mobilize LE, proximal hip and/or LS spine soft tissue/joints for the purpose of modulating pain, promoting relaxation,  increasing ROM, reducing/eliminating soft tissue swelling/inflammation/restriction, improving soft tissue extensibility and allowing for proper ROM for normal function with self care, mobility, lifting and ambulation. PT reviewed precautions, contraindications, and indications with pt in addition to application of dry needling within established plan of care and expected outcomes; pt verbalized understanding with all questions answered. Pt had signed consent form for dry needling and PT obtained written consent with updated plan of care from MD before beginning. Dry needling manual therapy: consisted on the placement of 5 needles in the following muscles:  R gluteus natalie and piriformis. A 60 mm needle was inserted, piston, rotated, and coned to produce intramuscular mobilization. These techniques were used to restore functional range of motion, reduce muscle spasm and induce healing in the corresponding musculature. (04451)  Clean Technique was utilized today while applying Dry needling treatment. The treatment sites where cleaned with 70% solution of  isopropyl alcohol . The PT washed their hands and utilized treatment gloves along with hand  prior to inserting the needles. All needles where removed and discarded in the appropriate sharps container.       Performed by Sherrie Iverson, PT, DPT  25 min  6/25    Modalities:     Charges:  Timed Code Treatment Minutes: 45   Total Treatment Minutes: 48   Time in: 8:04  Time out: 8:52    [] EVAL  [x] BS(57042) x  1   [] IONTO  [] NMR (90691) x      [] VASO  [x] Manual (02877) x  2    [] Other:   [] TA x       [] Mech Traction (72934)  [] ES(attended) (17096)      [] ES (un) (47503):     GOALS:   Patient stated goal: Get back to walking and running,

## 2019-08-13 DIAGNOSIS — I10 ESSENTIAL HYPERTENSION: ICD-10-CM

## 2019-08-13 RX ORDER — LISINOPRIL 10 MG/1
10 TABLET ORAL DAILY
Qty: 90 TABLET | Refills: 0 | Status: SHIPPED | OUTPATIENT
Start: 2019-08-13 | End: 2019-09-03 | Stop reason: SDUPTHER

## 2019-08-29 ENCOUNTER — HOSPITAL ENCOUNTER (OUTPATIENT)
Dept: PHYSICAL THERAPY | Age: 69
Setting detail: THERAPIES SERIES
Discharge: HOME | End: 2019-08-29
Payer: MEDICARE

## 2019-08-29 PROCEDURE — 97140 MANUAL THERAPY 1/> REGIONS: CPT

## 2019-08-29 NOTE — FLOWSHEET NOTE
extension (L2-L4) x       Dorsiflexion (L4-L5) x       Great Toe Ext (L5) x       Ankle Eversion (S1-S2) x       Ankle PF(S1-S2) x             Dermatomes Normal Abnormal Comments   inguinal area (L1)  x       anterior mid-thigh (L2) x       distal ant thigh/med knee (L3) x       medial lower leg and foot (L4) x       lateral lower leg and foot (L5) x       posterior calf (S1) x       medial calcaneus (S2) x             Neural dynamic tension testing Normal Abnormal Comments   Slump Test  - Degree of knee flexion:  x       SLR          0-30 x       30-70   x + for tightness   Femoral nerve (L2-4) x          Not assessed  Reflexes Normal Abnormal Comments   S1-2 Seated achilles         S1-2 Prone knee bend         L3-4 Patellar tendon         C5-6 Biceps         C6 Brachioradialis         C7-8 Triceps         Clonus         Babinski         Mckeon's            Joint mobility: Decreased B hip; decreased PA mobility lower thoracic and lumbar              []Normal               [x]Hypo              []Hyper     Palpation: SP of T10-L5, Based and sides of sacrum, lumbar paraspinals, glute med/max/piriformis, proximal bicep femoris     Functional Mobility/Transfers: Independent     Posture: Rounded shoulders     Gait: + B out toeing    RESTRICTIONS/PRECAUTIONS:     Exercises/Interventions:     Exercise/Equipment Resistance/Repetitions Other comments   WARM UP-Airdyne 5' before DN         Stretching     Hamstring 5x30\" supine with strap, B    Hip Flexion     ITB     Grion     Quad     Foam roll    HEP    Figure 4 3x30\" B    Trunk rotation  Top knee supported on foam roll   Child's pose 3x30\" B    Knee to opposite shoulder 3x30\" B              SLR     Supine     Prone     Abduction     Adducton     SLR+          Core                           Hold until core strength improves and pt can perform without LB/glute discomfort                            CKC     Calf raises     Wall sits     Step ups     1 leg stand     Squatting

## 2019-08-30 DIAGNOSIS — E78.00 PURE HYPERCHOLESTEROLEMIA: ICD-10-CM

## 2019-09-03 DIAGNOSIS — I10 ESSENTIAL HYPERTENSION: ICD-10-CM

## 2019-09-03 RX ORDER — ATORVASTATIN CALCIUM 20 MG/1
TABLET, FILM COATED ORAL
Qty: 90 TABLET | Refills: 0 | Status: SHIPPED | OUTPATIENT
Start: 2019-09-03 | End: 2019-12-12 | Stop reason: SDUPTHER

## 2019-09-03 RX ORDER — LISINOPRIL 10 MG/1
10 TABLET ORAL DAILY
Qty: 90 TABLET | Refills: 0 | Status: SHIPPED | OUTPATIENT
Start: 2019-09-03 | End: 2020-06-16 | Stop reason: SDUPTHER

## 2019-09-11 ENCOUNTER — HOSPITAL ENCOUNTER (OUTPATIENT)
Dept: PHYSICAL THERAPY | Age: 69
Setting detail: THERAPIES SERIES
Discharge: HOME | End: 2019-09-11
Payer: MEDICARE

## 2019-09-11 PROCEDURE — 97140 MANUAL THERAPY 1/> REGIONS: CPT

## 2019-09-11 NOTE — FLOWSHEET NOTE
The [address] [place] [address] [facility]    Physical Therapy Daily Treatment Note  Date:  2019    Patient Name:  Kelsey Us    :  1950  MRN: [id number]  Restrictions/Precautions:    Medical/Treatment Diagnosis Information:  · Diagnosis: M54.5 (ICD-10-CM) - Bilateral low back pain without sciatica, unspecified chronicity; G57.01, G57.02 (ICD-10-CM) - Piriformis syndrome of both sides;M70.61 (ICD-10-CM) - Trochanteric bursitis of right hip  · Treatment Diagnosis: M48.06, M54.5, M25.651, M25.562, M25.551, M25.552; pain and tightness leading to impaired activity participation  Insurance/Certification information:  PT Insurance Information: MEDICARE  Physician Information:  Referring Practitioner: Birdie Alejandro MD  Plan of care signed (Y/N):     Date of Patient follow up with Physician: as needed    G-Code (if applicable):         JENNIFER  19 score =10% deficit    Progress Note: [x]  Yes  []  No  Next due by: Visit #10       Latex Allergy:  [x]NO      []YES  Preferred Language for Healthcare:   [x]English       []other:    Visit # Insurance Allowable   10 MEDICARE     Pain level:  2-3/10 tightness present       SUBJECTIVE:   Patient states that he is feeling okay. He states that the pain is coming and going and feels better when he is walking and moving around. He states that his pain is worse at night by the end of the day. He states that after his last session he had a massage after and he had relief for a few days. He states that he ran this past week and he had less pain.       OBJECTIVE:         Standing Exam Normal Abnormal N/A Comments   Toe walk       x     Heel Walk     x     Side bending   x   + pain B, R > L  Lateral hip pain   Pelvic Height x         Fwd Bend- (aberrant juttering or innominate mvmt)   x   + pain at 10 deg, reports tightness   Extension x         Trendelenburg x         Kemps/Quadrant     x     Stork   x   + hypomobility   SLS/SLS w rotation   x   + joint pain B   Roxy sign   x   + pain B  (no pain when in sitting)               Seated Exam Normal Abnormal N/A Comments   Pelvic Height x         Seated Rotation   x   Significant restriction in R rotation   Seated flexion x         B hip IR   x   + for tightness                           Supine Exam Normal Abnormal N/A Comments   Hip flexion x         Abduction   x   + tightness   ER   x   Mild tightness   IR   x   Significant tightness, pain   SHAYAN/Kris   x   + for mild pain   FADIR   x   + B   Hip scour x         SLR x         Crossed SLR x         Supine to sit           SI distraction/ compression   x   + generalized LBP with compression   Hip thrust     x     Leg length       R 36 in  L 35.5 in               Prone Exam Normal Abnormal N/A Comments   Prone knee bend   x   R leg long to even   Prone hip IR     x Unable to tolerate prone knee bend position long enough to do test   B Achilles reflex/Pheasant     x     PA/Spring   x   + pain T10-L5   Prone Instability test     x     Sacral Spring/thrust   x   + pain base and borders                              ROM LEFT RIGHT Comments   Lumbar Flex     Limited d/t pain   Lumbar Ext     WFL   Side Bend Limited by 25% Limited by 50% + pain, R > L   Rotation WFL Limited by 75% +pain on R with R rotation, increased with overpressure                          ROM LEFT RIGHT Comments   Hip Flexion 135 125     Hip Abd 25 20 *Unable to maintain true ABD B, pt immediately starts to ER   Hip ER 40 33     Hip IR 20 10     Hip Extension 5 5     Knee Ext 0 0     Knee Flex 130 126     Hamstring Flex SLR 65 deg SLR 60 deg                                      Strength LEFT RIGHT Comments   Multifidus     Not assessed   Transverse Ab     Not assessed   Hip Flexors 4+/5 4+/5 Increased posterior pain  + groin discomfort   Hip Abductors 4+/5 4+/5     Hip Extensors 4+/5 4+/5     Seated hip ER 4+/5 4+/5     Seated hip IR 4+/5 4+/5     Knee flex 4+/5 4+/5     Knee pain  [] Patient limited by other medical complications  [x] Other: 9/11 Good tolerance to dry needling this session. Multiple muscle twitches noted in R and L gluteus natalie. No ill side effects noted. Patient reported soreness at end of session. Follow-up next visit to response of massage and DN. Prognosis: [x] Good [] Fair  [] Poor    Patient Requires Follow-up: [x] Yes  [] No    PLAN: See eval  [x] Continue per plan of care [] Alter current plan (see comments)  [] Plan of care initiated [] Hold pending MD visit [] Discharge    Electronically signed by: Beau Louis PT, DPT      *Note: If patient does not return for scheduled / recommended follow up visit, this note will serve as their discharge note from physical therapy.

## 2019-09-25 ENCOUNTER — HOSPITAL ENCOUNTER (OUTPATIENT)
Dept: PHYSICAL THERAPY | Age: 69
Setting detail: THERAPIES SERIES
Discharge: HOME | End: 2019-09-25
Payer: MEDICARE

## 2019-09-25 PROCEDURE — 97140 MANUAL THERAPY 1/> REGIONS: CPT

## 2019-09-25 NOTE — FLOWSHEET NOTE
Seated Rotation   x   Significant restriction in R rotation   Seated flexion x         B hip IR   x   + for tightness                           Supine Exam Normal Abnormal N/A Comments   Hip flexion x         Abduction   x   + tightness   ER   x   Mild tightness   IR   x   Significant tightness, pain   SHAYAN/Kris   x   + for mild pain   FADIR   x   + B   Hip scour x         SLR x         Crossed SLR x         Supine to sit           SI distraction/ compression   x   + generalized LBP with compression   Hip thrust     x     Leg length       R 36 in  L 35.5 in               Prone Exam Normal Abnormal N/A Comments   Prone knee bend   x   R leg long to even   Prone hip IR     x Unable to tolerate prone knee bend position long enough to do test   B Achilles reflex/Pheasant     x     PA/Spring   x   + pain T10-L5   Prone Instability test     x     Sacral Spring/thrust   x   + pain base and borders                              ROM LEFT RIGHT Comments   Lumbar Flex     Limited d/t pain   Lumbar Ext     WFL   Side Bend Limited by 25% Limited by 50% + pain, R > L   Rotation WFL Limited by 75% +pain on R with R rotation, increased with overpressure                          ROM LEFT RIGHT Comments   Hip Flexion 135 125     Hip Abd 25 20 *Unable to maintain true ABD B, pt immediately starts to ER   Hip ER 40 33     Hip IR 20 10     Hip Extension 5 5     Knee Ext 0 0     Knee Flex 130 126     Hamstring Flex SLR 65 deg SLR 60 deg                                      Strength LEFT RIGHT Comments   Multifidus     Not assessed   Transverse Ab     Not assessed   Hip Flexors 4+/5 4+/5 Increased posterior pain  + groin discomfort   Hip Abductors 4+/5 4+/5     Hip Extensors 4+/5 4+/5     Seated hip ER 4+/5 4+/5     Seated hip IR 4+/5 4+/5     Knee flex 4+/5 4+/5     Knee ext 4+/5 4+/5                            Myotomes Normal Abnormal Comments   Hip flexion (L1-L2) x       Knee extension (L2-L4) x       Dorsiflexion (L4-L5) x     PRE     Extension  RANGE:   Flexion  RANGE:        Cable Column          Leg Press  RANGE:        Bike     Treadmill            Plan for next session: progress as tolerated    Patient Education:  5/2/19 Pt educated on diagnosis, prognosis and PT plan of care. Educated on [address]. Pt questions were addressed and answered. Therapeutic Exercise and NMR EXR:  [x] (54452) Provided verbal/tactile cueing for activities related to strengthening, flexibility, endurance, ROM for improvements in LE, proximal hip, and core control with self care, mobility, lifting, ambulation.  [] (41084) Provided verbal/tactile cueing for activities related to improving balance, coordination, kinesthetic sense, posture, motor skill, proprioception  to assist with LE, proximal hip, and core control in self care, mobility, lifting, ambulation and eccentric single leg control.      NMR and Therapeutic Activities:    [] (33701 or 57523) Provided verbal/tactile cueing for activities related to improving balance, coordination, kinesthetic sense, posture, motor skill, proprioception and motor activation to allow for proper function of core, proximal hip and LE with self care and ADLs  [] (93966) Gait Re-education- Provided training and instruction to the patient for proper LE, core and proximal hip recruitment and positioning and eccentric body weight control with ambulation re-education including up and down stairs     Home Exercise Program:  Updated 5/30/19, printed handout given    [x] (39832) Reviewed/Progressed HEP activities related to strengthening, flexibility, endurance, ROM of core, proximal hip and LE for functional self-care, mobility, lifting and ambulation/stair navigation   [] (46460)Reviewed/Progressed HEP activities related to improving balance, coordination, kinesthetic sense, posture, motor skill, proprioception of core, proximal hip and LE for self care, mobility, lifting, and ambulation/stair navigation      Manual Treatments:

## 2019-10-09 ENCOUNTER — HOSPITAL ENCOUNTER (OUTPATIENT)
Dept: PHYSICAL THERAPY | Age: 69
Setting detail: THERAPIES SERIES
Discharge: HOME | End: 2019-10-09
Payer: OTHER MISCELLANEOUS

## 2019-10-09 PROCEDURE — 97140 MANUAL THERAPY 1/> REGIONS: CPT

## 2019-10-14 DIAGNOSIS — E11.9 TYPE 2 DIABETES MELLITUS WITHOUT COMPLICATION, WITHOUT LONG-TERM CURRENT USE OF INSULIN (HCC): ICD-10-CM

## 2019-10-14 LAB
A/G RATIO: 3.4 (ref 1.1–2.2)
ALBUMIN SERPL-MCNC: 5.4 G/DL (ref 3.4–5)
ALP BLD-CCNC: 80 U/L (ref 40–129)
ALT SERPL-CCNC: 74 U/L (ref 10–40)
ANION GAP SERPL CALCULATED.3IONS-SCNC: 12 MMOL/L (ref 3–16)
AST SERPL-CCNC: 55 U/L (ref 15–37)
BILIRUB SERPL-MCNC: 0.7 MG/DL (ref 0–1)
BUN BLDV-MCNC: 19 MG/DL (ref 7–20)
CALCIUM SERPL-MCNC: 9.5 MG/DL (ref 8.3–10.6)
CHLORIDE BLD-SCNC: 101 MMOL/L (ref 99–110)
CO2: 25 MMOL/L (ref 21–32)
CREAT SERPL-MCNC: 1 MG/DL (ref 0.8–1.3)
GFR AFRICAN AMERICAN: >60
GFR NON-AFRICAN AMERICAN: >60
GLOBULIN: 1.6 G/DL
GLUCOSE BLD-MCNC: 153 MG/DL (ref 70–99)
POTASSIUM SERPL-SCNC: 4.5 MMOL/L (ref 3.5–5.1)
SODIUM BLD-SCNC: 138 MMOL/L (ref 136–145)
TOTAL PROTEIN: 7 G/DL (ref 6.4–8.2)

## 2019-10-15 ENCOUNTER — TELEPHONE (OUTPATIENT)
Dept: FAMILY MEDICINE CLINIC | Age: 69
End: 2019-10-15

## 2019-10-15 DIAGNOSIS — R79.89 ELEVATED LFTS: Primary | ICD-10-CM

## 2019-10-15 LAB
ESTIMATED AVERAGE GLUCOSE: 128.4 MG/DL
HBA1C MFR BLD: 6.1 %

## 2019-10-22 ENCOUNTER — HOSPITAL ENCOUNTER (OUTPATIENT)
Dept: PHYSICAL THERAPY | Age: 69
Setting detail: THERAPIES SERIES
Discharge: HOME | End: 2019-10-22
Payer: OTHER MISCELLANEOUS

## 2019-10-22 PROCEDURE — 97112 NEUROMUSCULAR REEDUCATION: CPT | Performed by: PHYSICAL THERAPIST

## 2019-10-22 PROCEDURE — 97140 MANUAL THERAPY 1/> REGIONS: CPT | Performed by: PHYSICAL THERAPIST

## 2019-10-22 PROCEDURE — 97110 THERAPEUTIC EXERCISES: CPT | Performed by: PHYSICAL THERAPIST

## 2019-10-28 DIAGNOSIS — K21.9 GASTROESOPHAGEAL REFLUX DISEASE WITHOUT ESOPHAGITIS: ICD-10-CM

## 2019-10-29 RX ORDER — RANITIDINE 150 MG/1
TABLET ORAL
Qty: 180 TABLET | Refills: 0 | Status: SHIPPED | OUTPATIENT
Start: 2019-10-29 | End: 2020-03-02

## 2019-10-30 ENCOUNTER — HOSPITAL ENCOUNTER (OUTPATIENT)
Dept: PHYSICAL THERAPY | Age: 69
Setting detail: THERAPIES SERIES
Discharge: HOME | End: 2019-10-30
Payer: OTHER MISCELLANEOUS

## 2019-10-30 PROCEDURE — 97110 THERAPEUTIC EXERCISES: CPT | Performed by: PHYSICAL THERAPIST

## 2019-10-30 PROCEDURE — 97140 MANUAL THERAPY 1/> REGIONS: CPT | Performed by: PHYSICAL THERAPIST

## 2019-10-31 ENCOUNTER — HOSPITAL ENCOUNTER (OUTPATIENT)
Dept: ULTRASOUND IMAGING | Age: 69
Discharge: HOME | End: 2019-10-31
Payer: MEDICARE

## 2019-10-31 DIAGNOSIS — R79.89 ELEVATED LFTS: ICD-10-CM

## 2019-10-31 PROCEDURE — 76705 ECHO EXAM OF ABDOMEN: CPT

## 2019-11-05 ENCOUNTER — HOSPITAL ENCOUNTER (OUTPATIENT)
Dept: PHYSICAL THERAPY | Age: 69
Setting detail: THERAPIES SERIES
Discharge: HOME | End: 2019-11-05
Payer: MEDICARE

## 2019-11-05 PROCEDURE — 97140 MANUAL THERAPY 1/> REGIONS: CPT | Performed by: PHYSICAL THERAPIST

## 2019-11-05 PROCEDURE — 97032 APPL MODALITY 1+ESTIM EA 15: CPT | Performed by: PHYSICAL THERAPIST

## 2019-11-05 PROCEDURE — 97110 THERAPEUTIC EXERCISES: CPT | Performed by: PHYSICAL THERAPIST

## 2019-11-12 ENCOUNTER — OFFICE VISIT (OUTPATIENT)
Dept: FAMILY MEDICINE CLINIC | Age: 69
End: 2019-11-12
Payer: MEDICARE

## 2019-11-12 VITALS
HEART RATE: 62 BPM | WEIGHT: 211.4 LBS | SYSTOLIC BLOOD PRESSURE: 134 MMHG | OXYGEN SATURATION: 97 % | BODY MASS INDEX: 30.99 KG/M2 | RESPIRATION RATE: 12 BRPM | DIASTOLIC BLOOD PRESSURE: 87 MMHG

## 2019-11-12 DIAGNOSIS — I25.10 CORONARY ARTERY DISEASE INVOLVING NATIVE CORONARY ARTERY OF NATIVE HEART WITHOUT ANGINA PECTORIS: ICD-10-CM

## 2019-11-12 DIAGNOSIS — J01.00 ACUTE MAXILLARY SINUSITIS, RECURRENCE NOT SPECIFIED: Primary | ICD-10-CM

## 2019-11-12 DIAGNOSIS — E78.00 PURE HYPERCHOLESTEROLEMIA: ICD-10-CM

## 2019-11-12 DIAGNOSIS — R79.89 ELEVATED LFTS: ICD-10-CM

## 2019-11-12 DIAGNOSIS — M48.02 SPINAL STENOSIS IN CERVICAL REGION: ICD-10-CM

## 2019-11-12 DIAGNOSIS — E11.9 TYPE 2 DIABETES MELLITUS WITHOUT COMPLICATION, WITHOUT LONG-TERM CURRENT USE OF INSULIN (HCC): ICD-10-CM

## 2019-11-12 LAB
CREATININE URINE: 152.1 MG/DL (ref 39–259)
MICROALBUMIN UR-MCNC: <1.2 MG/DL
MICROALBUMIN/CREAT UR-RTO: NORMAL MG/G (ref 0–30)

## 2019-11-12 PROCEDURE — G8427 DOCREV CUR MEDS BY ELIG CLIN: HCPCS | Performed by: FAMILY MEDICINE

## 2019-11-12 PROCEDURE — 1123F ACP DISCUSS/DSCN MKR DOCD: CPT | Performed by: FAMILY MEDICINE

## 2019-11-12 PROCEDURE — G8417 CALC BMI ABV UP PARAM F/U: HCPCS | Performed by: FAMILY MEDICINE

## 2019-11-12 PROCEDURE — 1036F TOBACCO NON-USER: CPT | Performed by: FAMILY MEDICINE

## 2019-11-12 PROCEDURE — G8598 ASA/ANTIPLAT THER USED: HCPCS | Performed by: FAMILY MEDICINE

## 2019-11-12 PROCEDURE — 4040F PNEUMOC VAC/ADMIN/RCVD: CPT | Performed by: FAMILY MEDICINE

## 2019-11-12 PROCEDURE — G8484 FLU IMMUNIZE NO ADMIN: HCPCS | Performed by: FAMILY MEDICINE

## 2019-11-12 PROCEDURE — 99214 OFFICE O/P EST MOD 30 MIN: CPT | Performed by: FAMILY MEDICINE

## 2019-11-12 PROCEDURE — 3044F HG A1C LEVEL LT 7.0%: CPT | Performed by: FAMILY MEDICINE

## 2019-11-12 PROCEDURE — 3017F COLORECTAL CA SCREEN DOC REV: CPT | Performed by: FAMILY MEDICINE

## 2019-11-12 PROCEDURE — 2022F DILAT RTA XM EVC RTNOPTHY: CPT | Performed by: FAMILY MEDICINE

## 2019-11-12 RX ORDER — LEVOFLOXACIN 500 MG/1
500 TABLET, FILM COATED ORAL DAILY
Qty: 10 TABLET | Refills: 0 | Status: SHIPPED | OUTPATIENT
Start: 2019-11-12 | End: 2019-11-22

## 2019-12-12 DIAGNOSIS — E78.00 PURE HYPERCHOLESTEROLEMIA: ICD-10-CM

## 2019-12-12 RX ORDER — ATORVASTATIN CALCIUM 20 MG/1
TABLET, FILM COATED ORAL
Qty: 90 TABLET | Refills: 0 | Status: SHIPPED | OUTPATIENT
Start: 2019-12-12 | End: 2020-03-02

## 2020-01-23 RX ORDER — CLOPIDOGREL BISULFATE 75 MG/1
75 TABLET ORAL DAILY
Qty: 90 TABLET | Refills: 3 | Status: SHIPPED | OUTPATIENT
Start: 2020-01-23 | End: 2020-02-24

## 2020-01-31 DIAGNOSIS — E78.00 PURE HYPERCHOLESTEROLEMIA: ICD-10-CM

## 2020-01-31 DIAGNOSIS — R79.89 ELEVATED LFTS: ICD-10-CM

## 2020-01-31 LAB
ALBUMIN SERPL-MCNC: 4.9 G/DL (ref 3.4–5)
ALP BLD-CCNC: 86 U/L (ref 40–129)
ALT SERPL-CCNC: 76 U/L (ref 10–40)
AST SERPL-CCNC: 39 U/L (ref 15–37)
BILIRUB SERPL-MCNC: 1 MG/DL (ref 0–1)
BILIRUBIN DIRECT: <0.2 MG/DL (ref 0–0.3)
BILIRUBIN, INDIRECT: ABNORMAL MG/DL (ref 0–1)
CHOLESTEROL, TOTAL: 122 MG/DL (ref 0–199)
HDLC SERPL-MCNC: 33 MG/DL (ref 40–60)
LDL CHOLESTEROL CALCULATED: 73 MG/DL
TOTAL PROTEIN: 7.3 G/DL (ref 6.4–8.2)
TRIGL SERPL-MCNC: 82 MG/DL (ref 0–150)
VLDLC SERPL CALC-MCNC: 16 MG/DL

## 2020-02-04 ENCOUNTER — OFFICE VISIT (OUTPATIENT)
Dept: FAMILY MEDICINE CLINIC | Age: 70
End: 2020-02-04
Payer: MEDICARE

## 2020-02-04 VITALS
DIASTOLIC BLOOD PRESSURE: 74 MMHG | SYSTOLIC BLOOD PRESSURE: 129 MMHG | TEMPERATURE: 97.7 F | BODY MASS INDEX: 30.88 KG/M2 | HEART RATE: 56 BPM | OXYGEN SATURATION: 97 % | WEIGHT: 210.6 LBS

## 2020-02-04 LAB — HBA1C MFR BLD: 6.5 %

## 2020-02-04 PROCEDURE — 4040F PNEUMOC VAC/ADMIN/RCVD: CPT | Performed by: FAMILY MEDICINE

## 2020-02-04 PROCEDURE — 1123F ACP DISCUSS/DSCN MKR DOCD: CPT | Performed by: FAMILY MEDICINE

## 2020-02-04 PROCEDURE — G8427 DOCREV CUR MEDS BY ELIG CLIN: HCPCS | Performed by: FAMILY MEDICINE

## 2020-02-04 PROCEDURE — 99214 OFFICE O/P EST MOD 30 MIN: CPT | Performed by: FAMILY MEDICINE

## 2020-02-04 PROCEDURE — G8484 FLU IMMUNIZE NO ADMIN: HCPCS | Performed by: FAMILY MEDICINE

## 2020-02-04 PROCEDURE — G8417 CALC BMI ABV UP PARAM F/U: HCPCS | Performed by: FAMILY MEDICINE

## 2020-02-04 PROCEDURE — 83036 HEMOGLOBIN GLYCOSYLATED A1C: CPT | Performed by: FAMILY MEDICINE

## 2020-02-04 PROCEDURE — 3044F HG A1C LEVEL LT 7.0%: CPT | Performed by: FAMILY MEDICINE

## 2020-02-04 PROCEDURE — 3017F COLORECTAL CA SCREEN DOC REV: CPT | Performed by: FAMILY MEDICINE

## 2020-02-04 PROCEDURE — 2022F DILAT RTA XM EVC RTNOPTHY: CPT | Performed by: FAMILY MEDICINE

## 2020-02-04 PROCEDURE — 1036F TOBACCO NON-USER: CPT | Performed by: FAMILY MEDICINE

## 2020-02-04 RX ORDER — ALPRAZOLAM 0.5 MG/1
TABLET ORAL
Qty: 2 TABLET | Refills: 0 | Status: SHIPPED | OUTPATIENT
Start: 2020-02-04 | End: 2020-03-04

## 2020-02-04 NOTE — PROGRESS NOTES
SUBJECTIVE:  71 y.o.. male  for follow up of diabetes, hypertension, and hypercholesterolemia. Diabetic Review of Systems - medication compliance: noncompliant some of the time, diabetic diet compliance: noncompliant some of the time, home glucose monitoring: is not performed, further diabetic ROS: no polyuria or polydipsia, no chest pain, dyspnea or TIA's, no numbness, tingling or pain in extremities, last eye exam approximately ? ago. Patient is here for follow up of elevated liver function tests . He stopped alcohol for 4 weeks and liver function tests improved some. He did have some beer after repeat liver function tests . He is having some balance issues off and on . He gets vertigo intermittent , but not full blown. Usually flared with positional changes. Mild headaches off and on . He had dental work done and did contribute to headaches. Usually he does not take anything given not severe. Off alcohol no change in headaches . Last eye exam was 1 year ago or more. He notices getting up from toilet can flare the vertigo. He did Physical Therapy , dry needling and stretching for pirformis issue. He then went to chiropractor for 12 weeks and seemed to get worse with radiation to legs now. He now recalls lifting something in 10/19 and likely flared some of his symptoms. He had Xrays at chiropractor. It was recommended to continue for $800 for another 4 weeks ,but did not want to pursue. No back pain now in the office with sitting here now. Flared on Sunday with prolonged standing with numbness / tingling to feet bilateral.  No weakness to legs/ feet. Some buttock pain bilateral.  Walking seems to be okay intermittently . Prolonged walking > 2 miles flares symptoms. He has a part time job. - [employer] and does walking . Prolonged sitting also flares the pain.         Current Outpatient Medications   Medication Sig Dispense Refill    sertraline (ZOLOFT) 50 MG tablet TAKE 1 TABLET BY were negative . Patient's allergies and medications were reviewed. Patient's past medical, surgical, social , and family history were reviewed. OBJECTIVE:  /74   Pulse 56   Temp 97.7 °F (36.5 °C) (Oral)   Wt 210 lb 9.6 oz (95.5 kg)   SpO2 97%   BMI 30.88 kg/m²   General: NAD, cooperative, alert and oriented X 3. Mood / affect is good. good insight. well hydrated. HEENT: PERRLA, EOMI, TMs - clear. Nasopharynx clear. Neck : no lymphadenopathy, supple, FROM  CV: Regular rate and rhythm , no murmurs/ rub/ gallop. No edema. Lungs : CTA bilaterally, breathing comfortably  Abdomen: positive bowel sounds, soft , non tender, non distended. No hepatosplenomegaly. No CVA tenderness. Lower extremities : DTRs 2+ knees and ankles bilateral.  Tight hamstrings bilateral. Strength 5/5. Negative straight leg-raise. No edema or erythema bilateral.   Skin: no rashes. Non tender. Feet: normal sensation to monofilament bilaterally, no ulcers. DP/ PT pulses normal.   Skin: no rashes. Non tender. ASSESSMENT/  PLAN:  1. Type 2 diabetes mellitus without complication, without long-term current use of insulin (HCC)  - Continue dietary/ lifestyle modifications, including decreased concentrated sweets and carbohydrates. - POCT glycosylated hemoglobin (Hb A1C)    2. Bilateral low back pain without sciatica, unspecified chronicity  - Moist heat . ROM exercises. Modify activities. - MRI LUMBAR SPINE WO CONTRAST; Future and follow up after completed/ prn.      3. DDD (degenerative disc disease), lumbar  - Moist heat . ROM exercises. Modify activities. - MRI LUMBAR SPINE WO CONTRAST; Future and follow up after completed/ prn.     4. Elevated LFTs  - mild improvement off alcohol X 4 weeks. - Avoid Tylenol, NSAIDs (Ibuprofen, Advil, Motrin, or Aleve) , and alcohol. Recheck LFTs in 6-8 weeks. 5. Claustrophobia  - Use Xanax sparingly prior to MRI.   - ALPRAZolam (XANAX) 0.5 MG tablet;  Take 1 -2 po 30-60 minutes

## 2020-02-10 ENCOUNTER — HOSPITAL ENCOUNTER (OUTPATIENT)
Dept: MRI IMAGING | Age: 70
Discharge: HOME | End: 2020-02-10
Payer: MEDICARE

## 2020-02-10 PROCEDURE — 72148 MRI LUMBAR SPINE W/O DYE: CPT

## 2020-02-11 PROBLEM — M48.061 LUMBAR STENOSIS: Status: ACTIVE | Noted: 2020-02-01

## 2020-02-24 RX ORDER — CLOPIDOGREL BISULFATE 75 MG/1
75 TABLET ORAL DAILY
Qty: 90 TABLET | Refills: 0 | Status: SHIPPED | OUTPATIENT
Start: 2020-02-24 | End: 2020-10-20

## 2020-03-02 RX ORDER — RANITIDINE 150 MG/1
TABLET ORAL
Qty: 180 TABLET | Refills: 0 | Status: SHIPPED | OUTPATIENT
Start: 2020-03-02 | End: 2020-03-31 | Stop reason: SDUPTHER

## 2020-03-02 RX ORDER — ATORVASTATIN CALCIUM 20 MG/1
TABLET, FILM COATED ORAL
Qty: 90 TABLET | Refills: 0 | Status: SHIPPED | OUTPATIENT
Start: 2020-03-02 | End: 2020-04-08 | Stop reason: SDUPTHER

## 2020-03-09 ENCOUNTER — OFFICE VISIT (OUTPATIENT)
Dept: FAMILY MEDICINE CLINIC | Age: 70
End: 2020-03-09
Payer: MEDICARE

## 2020-03-09 VITALS
WEIGHT: 217.2 LBS | BODY MASS INDEX: 31.84 KG/M2 | OXYGEN SATURATION: 96 % | HEART RATE: 55 BPM | DIASTOLIC BLOOD PRESSURE: 77 MMHG | TEMPERATURE: 97.6 F | SYSTOLIC BLOOD PRESSURE: 123 MMHG | RESPIRATION RATE: 16 BRPM

## 2020-03-09 PROCEDURE — G8427 DOCREV CUR MEDS BY ELIG CLIN: HCPCS | Performed by: FAMILY MEDICINE

## 2020-03-09 PROCEDURE — 1036F TOBACCO NON-USER: CPT | Performed by: FAMILY MEDICINE

## 2020-03-09 PROCEDURE — 99214 OFFICE O/P EST MOD 30 MIN: CPT | Performed by: FAMILY MEDICINE

## 2020-03-09 PROCEDURE — 3044F HG A1C LEVEL LT 7.0%: CPT | Performed by: FAMILY MEDICINE

## 2020-03-09 PROCEDURE — 2022F DILAT RTA XM EVC RTNOPTHY: CPT | Performed by: FAMILY MEDICINE

## 2020-03-09 PROCEDURE — 1123F ACP DISCUSS/DSCN MKR DOCD: CPT | Performed by: FAMILY MEDICINE

## 2020-03-09 PROCEDURE — 3017F COLORECTAL CA SCREEN DOC REV: CPT | Performed by: FAMILY MEDICINE

## 2020-03-09 PROCEDURE — G8484 FLU IMMUNIZE NO ADMIN: HCPCS | Performed by: FAMILY MEDICINE

## 2020-03-09 PROCEDURE — 4040F PNEUMOC VAC/ADMIN/RCVD: CPT | Performed by: FAMILY MEDICINE

## 2020-03-09 PROCEDURE — G8417 CALC BMI ABV UP PARAM F/U: HCPCS | Performed by: FAMILY MEDICINE

## 2020-03-09 RX ORDER — MOMETASONE FUROATE 1 MG/G
CREAM TOPICAL
Qty: 50 G | Refills: 0 | Status: SHIPPED | OUTPATIENT
Start: 2020-03-09 | End: 2020-03-31 | Stop reason: ALTCHOICE

## 2020-03-11 ENCOUNTER — TELEPHONE (OUTPATIENT)
Dept: ORTHOPEDIC SURGERY | Age: 70
End: 2020-03-11

## 2020-03-11 ENCOUNTER — OFFICE VISIT (OUTPATIENT)
Dept: ORTHOPEDIC SURGERY | Age: 70
End: 2020-03-11
Payer: MEDICARE

## 2020-03-11 ENCOUNTER — TELEPHONE (OUTPATIENT)
Dept: FAMILY MEDICINE CLINIC | Age: 70
End: 2020-03-11

## 2020-03-11 VITALS
WEIGHT: 213 LBS | BODY MASS INDEX: 31.55 KG/M2 | SYSTOLIC BLOOD PRESSURE: 123 MMHG | DIASTOLIC BLOOD PRESSURE: 50 MMHG | HEIGHT: 69 IN | HEART RATE: 56 BPM

## 2020-03-11 PROCEDURE — G8484 FLU IMMUNIZE NO ADMIN: HCPCS | Performed by: PHYSICIAN ASSISTANT

## 2020-03-11 PROCEDURE — G8427 DOCREV CUR MEDS BY ELIG CLIN: HCPCS | Performed by: PHYSICIAN ASSISTANT

## 2020-03-11 PROCEDURE — G8417 CALC BMI ABV UP PARAM F/U: HCPCS | Performed by: PHYSICIAN ASSISTANT

## 2020-03-11 PROCEDURE — 99204 OFFICE O/P NEW MOD 45 MIN: CPT | Performed by: PHYSICIAN ASSISTANT

## 2020-03-11 NOTE — PROGRESS NOTES
New Patient: SPINE    Referring Provider:  Bree Harding MD    Chief Complaint   Patient presents with    New Patient     Lumbar spine pain       HISTORY OF PRESENT ILLNESS:      · The patient is being sent at the request of Bree Harding MD in consultation as a new spine patient for low back pain and bilateral leg pain. The patient is a 71 y.o. male whom reports symptoms for 1 years. Symptoms progressed over the last  3 months. Patient reports there was not a significant event to cause the symptoms, however the patient did go to the chiropractor that he believe made things worse. He did have a lifting injury back when the pain began. Today discomfort is report at 4 out of 10, describing it as dull, stabbing. Symptoms are aggravated by: walking, standing, lifting, stairs. Patient has undergone recent treatment including, chiropractor for 12 weeks 3 times a week, PT with a HEP, massage therapy, heat/ice, dry needling. Patient denies previous lumbar spine surgery. · The patient presents today as a new patient for the lower back and bilateral leg pain. The patient describes that his pain will radiate down from the buttocks and into the bilateral legs down the back of the legs to the knee. The patient will have occasional pain down the right leg to the foot however this is improved. The patient describes that his pain is worsened over the last 3 months. He is currently a 4/10 in severity. He describes that walking, climbing stairs, and also lifting specifically when lifting and climbing stairs his pain will increase dramatically. The patient does not have as much back pain it is more buttocks and leg pain that is causing him issues. The patient is a runner and was diagnosed with plantar fasciitis and was having work on his piriformis by a chiropractor which seems to have actually aggravated the pain. He was going to a chiropractor for 12 weeks 3 times a week.   The patient is also been through physical therapy and he has had dry needling completed. He is also tried massage therapy all of which did help except for the chiropractic visits as of late have made the pain worse. Patient did have an MRI of the lumbar spine completed on 2/10/2020 and was referred here by his primary care. The patient describes that he is actually better when walking. Pain Assessment  Location of Pain: Back  Location Modifiers: (lumbar)  Severity of Pain: 6  Quality of Pain: (stabbing)  Duration of Pain: Persistent  Frequency of Pain: Constant  Date Pain First Started: (11/01/2018)  Aggravating Factors: Walking, Standing, Stairs, Other (Comment)(after prolonged sitting)  Limiting Behavior: No  Relieving Factors: Heat  Result of Injury: No  Work-Related Injury: No  Are there other pain locations you wish to document?: No      Associated signs and symptoms:   Neurogenic bowel or bladder symptoms:  no   Perceived weakness:  no   Difficulty walking:  yes    Recent Imaging (within past one year)   Xrays: yes   MRI or CT of spine: yes    Current/Past Treatment:   · Physical Therapy:  yes  · Chiropractic:  yes  · Injection:  none  · Medications:   NSAIDS:  none   Muscle relaxer:  none   Steriods:  none   Neuropathic medications:  none   Opioids:  none  · Previous surgery:  no  · Previous surgical consult:  no  · Other:  · Infection control  · Tested positive for MRSA in past 12 months:  no  · Tested positive for MSSA \"staph infection\" in past 12 months: no  · Tested positive for VRE (Vancomycin Resistant Enterococci) in past 12 months:   no  · Currently on any antibiotics for an infection: no  · Anticoagulants:  · On a blood thinner:  yes , Plavix, ASA, and Fish Oil  · Any history of bleeding disorder: no   · MRI Contraindication: no   · Previous Pain Management: no   · Goal for treatment: To feel better. · How long can you stand? 2 minutes   Sit? No problem         Walk?  No problem                  Past Medical History:   Past Medical Bowstring test negative  · Skin: There are no rashes, ulcerations or lesions. · Reflexes: Reflexes are symmetrically 2+ at the patellar and ankle tendons. Clonus absent bilaterally at the feet. · Gait & station: normal, patient ambulates without assistance and no ataxia  · Additional Examinations:  · RIGHT LOWER EXTREMITY: Inspection/examination of the right lower extremity does not show any tenderness, deformity or injury. Range of motion is unremarkable. There is no gross instability. There are no rashes, ulcerations or lesions. Strength and tone are normal. No atrophy or abnormal movements are noted. · LEFT LOWER EXTREMITY:  Inspection/examination of the left lower extremity does not show any tenderness, deformity or injury. Range of motion is unremarkable. There is no gross instability. There are no rashes, ulcerations or lesions. Strength and tone are normal. No atrophy or abnormal movements are noted. Diagnostic Testing:    Xrays:   I personally reviewed images of the from 4/29/19:  INDICATIONS: Bilateral low back pain       5 lumbar vertebral bodies       Reversal of curvature at the L1-L2 level. L1-L2 degenerative disc space narrowing.       Lower lumbar facet arthropathy mild L4-5, mild to moderate L5-S1. Bridging osteophytes on the left L2-3 through L4-5       No compression fracture       Alignment: Mild degenerative retrolisthesis of L2 on L3       Aortic calcification.           Impression   1. Lower lumbar facet arthropathy. 2. Degenerative disc disease L1-L2 and L2-L3       MRI or CT:  MRI of Lumbar Spine from 2/10/20:   Conus medullaris normal size and configuration, terminating at the thoracolumbar junction. Straightening of lumbar spine is associated with anatomical alignment. Vertebral height maintained. No suspicious osseous lesion.       T12-L1-disc desiccation with mildly compressive disc bulge       L1-L2-disc desiccation with disc thinning.  Uniform annular bulge is mildly compressive. Foramina patent       L2-L3-disc desiccation with disc thinning. Mild-moderate canal stenosis with diffuse disc bulge in combination with mild facet arthropathy. Congenitally shortened pedicles may be a contributing factor as well. No significant foraminal stenosis       L3-L4-disc desiccation without disc thinning. Broad central disc herniation of the extrusion type shows mild caudal migration and severely compresses the thecal sac. Facets are mildly arthropathic. Mild bilateral foraminal stenosis       Inferior to the L3-L4 interspace, cauda equina nerve roots are clumped together as an ancillary finding of significant spinal stenosis .       L4-L5-disc desiccation without disc thinning. Moderate canal stenosis is multifactorial with diffuse disc bulge and facet arthropathy. Mild bilateral foraminal stenosis without neural effacement.       L5-S1-small right paracentral disc protrusion is noncompressive on the tapered thecal sac. Proximal S1 nerve roots are also unaffected. Facets are arthropathic with mild-moderate bilateral foraminal stenosis.         Impression       Multilevel degenerative disc disease       Mild-moderate canal stenosis L2-L3, multifactorial with disc bulge.       Severely compressive central disc extrusion at L3-L4 with caudal migration. Both foramina mildly compromised       Moderate canal stenosis L4-L5 with disc bulge and facet arthropathy       Noncompressive small right paracentral disc protrusion L5-S1. Mild-moderate bilateral foraminal stenosis relates to facet arthropathy     EMG:  None  Results for orders placed or performed in visit on 02/04/20   POCT glycosylated hemoglobin (Hb A1C)   Result Value Ref Range    Hemoglobin A1C 6.5 %       Impression (Medical Decision Making):       1. Lumbar radiculopathy    2. HNP (herniated nucleus pulposus), lumbar    3. DDD (degenerative disc disease), lumbar    4.  Lumbar facet arthropathy        Plan (Medical Decision Making): I discussed the diagnosis and the treatment options with Farhat Mack today. In Summary:  The various treatment options were outlined and discussed with Farhat Mack including:  Conservative care options: physical therapy, ice, medications, bracing, and activity modification. The indications for therapeutic injections. The indications for additional imaging/laboratory studies. The indications for (possible future) interventions. After considering the various options discussed, Farhat Mack elected to pursue a course of treatment that includes the followin. Medications: No further recommendations for new medications. 2. PT:  Encouraged to continue with Home exercise program.    3. Further studies: No further studies. 4. Interventional:  We discussed pursuing a L3-4 IL epidural steroid injection to address the pain. Radiologic imaging and symptoms confirm the pain etiology. Risks, benefits and alternatives of interventional options were discussed. These include and are not limited to bleeding, infection, spinal headache, nerve injury, increased pain and lack of pain relief. The patient verbalized understanding and would like to proceed. The patient will be scheduled accordingly.     5. Healthy Lifestyle Measures:  Patient education material reviewing the following was distributed to Farhat Delucaffer  Anatomic drawings  Healthy lifestyle education  Osteoporosis prevention,   Back and neck pain educational information   Advanced imaging preparedness    Posture education   Proper lifting and carrying techniques,   Weight management  Quitting smoking and   Minor ways to treat back pain  For further information regarding the spine conditions and to review interventional treatments the patient was directed to [employer].    6.  Follow up:  4-6 weeks    Farhat Mack was instructed to call the office if his symptoms worsen or if new symptoms appear prior to the next scheduled visit. He is specifically instructed to contact the office between now & his scheduled appointment if he has concerns related to his condition or if he needs assistance in scheduling the above tests. He is welcome to call for an appointment sooner if he has any additional concerns or questions. CHARITY Eden, SHIMON  Board Certified by the [employer] on Certification of Physician Assistants  [address] [facility]  Partner of [facility] ([facility])       This dictation was performed with a verbal recognition program [facility]) and it was checked for errors. It is possible that there are still dictated errors within this office note. If so, please bring any errors to my attention for an addendum. All efforts were made to ensure that this office note is accurate.

## 2020-03-11 NOTE — TELEPHONE ENCOUNTER
Spoke with Michelle Avendano at [address] [place] [address]. Gave verbal ok for pt to hold plavix for 7 days.  Ok per pcp MM

## 2020-03-11 NOTE — TELEPHONE ENCOUNTER
Dr Jeimy Lima office called, stating they are treating patient for back pain, would like pt to undergo lumber epidural steroid injection. Pt is scheduled for 3/23/20, but needs approval from  to hold Plavix for 7 days prior. They state approval can be given verbally when calling back, or to have letter drafted in [facility] They are requesting return call with update to [phone], and state can ask for Karla.     Fawn Goel, Dr. Jeimy Lima office  Fax # [phone]

## 2020-03-12 ENCOUNTER — TELEPHONE (OUTPATIENT)
Dept: ORTHOPEDIC SURGERY | Age: 70
End: 2020-03-12

## 2020-03-16 ENCOUNTER — TELEPHONE (OUTPATIENT)
Dept: ORTHOPEDIC SURGERY | Age: 70
End: 2020-03-16

## 2020-03-17 ENCOUNTER — TELEPHONE (OUTPATIENT)
Dept: ORTHOPEDIC SURGERY | Age: 70
End: 2020-03-17

## 2020-03-18 ENCOUNTER — APPOINTMENT (OUTPATIENT)
Dept: GENERAL RADIOLOGY | Age: 70
End: 2020-03-18
Attending: PHYSICAL MEDICINE & REHABILITATION
Payer: OTHER MISCELLANEOUS

## 2020-03-18 ENCOUNTER — HOSPITAL ENCOUNTER (OUTPATIENT)
Age: 70
Setting detail: OUTPATIENT SURGERY
Discharge: HOME | End: 2020-03-18
Attending: PHYSICAL MEDICINE & REHABILITATION | Admitting: PHYSICAL MEDICINE & REHABILITATION
Payer: OTHER MISCELLANEOUS

## 2020-03-18 VITALS
HEART RATE: 54 BPM | RESPIRATION RATE: 16 BRPM | SYSTOLIC BLOOD PRESSURE: 137 MMHG | WEIGHT: 215 LBS | BODY MASS INDEX: 30.78 KG/M2 | HEIGHT: 70 IN | OXYGEN SATURATION: 99 % | DIASTOLIC BLOOD PRESSURE: 79 MMHG

## 2020-03-18 LAB
GLUCOSE BLD-MCNC: 99 MG/DL (ref 70–99)
PERFORMED ON: NORMAL

## 2020-03-18 PROCEDURE — 77003 FLUOROGUIDE FOR SPINE INJECT: CPT

## 2020-03-18 PROCEDURE — 6360000002 HC RX W HCPCS: Performed by: PHYSICAL MEDICINE & REHABILITATION

## 2020-03-18 PROCEDURE — 2500000003 HC RX 250 WO HCPCS: Performed by: PHYSICAL MEDICINE & REHABILITATION

## 2020-03-18 PROCEDURE — 99152 MOD SED SAME PHYS/QHP 5/>YRS: CPT | Performed by: PHYSICAL MEDICINE & REHABILITATION

## 2020-03-18 PROCEDURE — 3610000056 HC PAIN LEVEL 4 BASE (NON-OR): Performed by: PHYSICAL MEDICINE & REHABILITATION

## 2020-03-18 PROCEDURE — 2709999900 HC NON-CHARGEABLE SUPPLY: Performed by: PHYSICAL MEDICINE & REHABILITATION

## 2020-03-18 RX ORDER — LIDOCAINE HYDROCHLORIDE 10 MG/ML
INJECTION, SOLUTION INFILTRATION; PERINEURAL
Status: COMPLETED | OUTPATIENT
Start: 2020-03-18 | End: 2020-03-18

## 2020-03-18 RX ORDER — BUPIVACAINE HYDROCHLORIDE 5 MG/ML
INJECTION, SOLUTION EPIDURAL; INTRACAUDAL
Status: COMPLETED | OUTPATIENT
Start: 2020-03-18 | End: 2020-03-18

## 2020-03-18 RX ORDER — BETAMETHASONE SODIUM PHOSPHATE AND BETAMETHASONE ACETATE 3; 3 MG/ML; MG/ML
INJECTION, SUSPENSION INTRA-ARTICULAR; INTRALESIONAL; INTRAMUSCULAR; SOFT TISSUE
Status: COMPLETED | OUTPATIENT
Start: 2020-03-18 | End: 2020-03-18

## 2020-03-18 RX ORDER — MIDAZOLAM HYDROCHLORIDE 1 MG/ML
INJECTION INTRAMUSCULAR; INTRAVENOUS
Status: COMPLETED | OUTPATIENT
Start: 2020-03-18 | End: 2020-03-18

## 2020-03-18 ASSESSMENT — PAIN SCALES - GENERAL
PAINLEVEL_OUTOF10: 0
PAINLEVEL_OUTOF10: 0

## 2020-03-18 ASSESSMENT — PAIN - FUNCTIONAL ASSESSMENT: PAIN_FUNCTIONAL_ASSESSMENT: 0-10

## 2020-03-18 ASSESSMENT — PAIN DESCRIPTION - DESCRIPTORS: DESCRIPTORS: ACHING;SHARP

## 2020-03-18 NOTE — H&P
HISTORY AND PHYSICAL/PRE-SEDATION ASSESSMENT    Patient:  Kel Garcia   :  1950  Medical Record No.:  [id number]   Date:  3/18/2020  Physician:  Michele Augustine M.D. Facility: [address] [place] [address]    HISTORY OF PRESENT ILLNESS:                 The patient is a 71 y.o. male whom presents with low back and leg pain. Review of the imaging and physical exam of the patient confirmed the pre-procedure diagnosis. After a thorough discussion of risks, benefits and alternatives informed consent was obtained.                 Past Medical History:   Past Medical History:   Diagnosis Date    AV block, 1st degree     CAD (coronary artery disease)     Dr. Brittni Salcedo    Calculus of kidney 2010    Chondromalacia of patella     DDD (degenerative disc disease), lumbar     L2-3; L4-5    DDD (degenerative disc disease), lumbar 2019    L1-2; L2-3, L3-4; L4-5; L5-S1    Degeneration of cervical intervertebral disc      Degenerative arthritis of left foot 2016    Depression and anxiety      Diabetes mellitus, type 2 ([address] 2019    ED (erectile dysfunction)      Esophageal reflux      Lumbar stenosis 2020    L2-3; L3-4; L4-5    Obstructive sleep apnea     CPAP    Prediabetes 4/15    Pure hypercholesterolemia      Retinal tears 1997    Spinal stenosis in cervical region      Testosterone deficiency     Tricuspid regurgitation      Ventricular tachycardia, nonsustained       Past Surgical History:     Past Surgical History:   Procedure Laterality Date    APPENDECTOMY      CERVICAL FUSION  2010    ACDF C7-T1with Bilateral C8 Root Decompression, Zero & Impant w/synthesis Removal of C7 screw on Left   [address]    right    KNEE SURGERY      left    KNEE SURGERY      right    PROSTATE BIOPSY  2016    PTCA  2011    with stent LAD    TONSILLECTOMY      VASECTOMY       Current Medications:   Prior to Admission care   ______________________     The risks and benefits as well as alternatives to the procedure have been discussed with the patient and or family. The patient and or next of kin understands and agrees to proceed.     Phuong Gonzales M.D.

## 2020-03-18 NOTE — OP NOTE
Patient:  Kaity Brown  YOB: 1950  Medical Record #:  [id number]   Place: [address] [place] [address]  Date:  3/18/2020   Physician:  Martha Hayes MD, FAUSTO    Procedure: 1. Transforaminal Lumbar Epidural Steroid Injection -  right L3  CPT 71143          2. Transforaminal Lumbar Epidural Steroid Injection -  left L3  CPT Mod 50    Pre-Procedure Diagnosis: Lumbar radiculopathy      Post-Procedure Diagnosis: Same    Sedation: Local with 1% Lidocaine 2.5 ml and 2 mg of IV Versed    EBL: None    Complications: None    Procedure Summary:        The patient was brought to the procedure suite and placed in the prone position. The skin overlying the lumbar spine was prepped and draped in the usual sterile fashion. Using fluoroscopic guidance, the right L3 foramen was identified. Through anesthetized skin, a 22 gauge 3.5 inch curved tip spinal needle was advanced into the foramen. Isovue M 300 was instilled showing an epidurogram/nerve root outline pattern without evidence of vascular or intrathecal spread. Following which, 7.5 mg of Celestone mixed with 1 ml of 0.5% Marcaine was instilled. The needle was removed. Using fluoroscopic guidance, the left L3 foramen was identified. Through anesthetized skin, a 22 gauge 3.5 inch curved tip spinal needle was advanced into the foramen. Isovue M 300 was instilled showing an epidurogram/nerve root outline pattern without evidence of vascular or intrathecal spread. Following which, 7.5 mg of Celestone mixed with 1 ml of 0.5% Marcaine was instilled. The needle was removed and band-aids were applied. The patient was transferred to the post-operative area in stable condition.

## 2020-03-30 ENCOUNTER — TELEPHONE (OUTPATIENT)
Dept: FAMILY MEDICINE CLINIC | Age: 70
End: 2020-03-30

## 2020-03-31 ENCOUNTER — TELEMEDICINE (OUTPATIENT)
Dept: FAMILY MEDICINE CLINIC | Age: 70
End: 2020-03-31
Payer: MEDICARE

## 2020-03-31 PROCEDURE — 1123F ACP DISCUSS/DSCN MKR DOCD: CPT | Performed by: FAMILY MEDICINE

## 2020-03-31 PROCEDURE — G8427 DOCREV CUR MEDS BY ELIG CLIN: HCPCS | Performed by: FAMILY MEDICINE

## 2020-03-31 PROCEDURE — 4040F PNEUMOC VAC/ADMIN/RCVD: CPT | Performed by: FAMILY MEDICINE

## 2020-03-31 PROCEDURE — 99213 OFFICE O/P EST LOW 20 MIN: CPT | Performed by: FAMILY MEDICINE

## 2020-03-31 PROCEDURE — 3017F COLORECTAL CA SCREEN DOC REV: CPT | Performed by: FAMILY MEDICINE

## 2020-03-31 RX ORDER — VALACYCLOVIR HYDROCHLORIDE 1 G/1
1000 TABLET, FILM COATED ORAL 3 TIMES DAILY
Qty: 21 TABLET | Refills: 0 | Status: SHIPPED | OUTPATIENT
Start: 2020-03-31 | End: 2020-04-07

## 2020-03-31 NOTE — PROGRESS NOTES
3/31/2020    TELEHEALTH EVALUATION -- Audio/Visual (During BUIKR-55 public health emergency)    Due to [place]wed[place] 19 outbreak, patient's office visit was converted to a virtual visit. Patient was contacted and agreed to proceed with a virtual visit via [address] Visit  The risks and benefits of converting to a virtual visit were discussed in light of the current infectious disease epidemic. Patient also understood that insurance coverage and co-pays are up to their individual insurance plans. HPI:    Milly Platt (:  1950) has requested an audio/video evaluation for the following concern(s):    Patient started with rash to buttocks 1 week ago. The rash is to his left buttock only currently. Some itching. No burning noted. Increasing rash with some new clusters. No fever. No cough. Had Epidural on 3/20/20 to back. He developed a rash to face and upper chest , which lasted for 2-3 days and started the day after the Epidural injection. Review of Systems    Prior to Visit Medications    Medication Sig Taking? Authorizing Provider   mometasone (ELOCON) 0.1 % cream Apply topically bid prn rash. Elham Ayala MD   atorvastatin (LIPITOR) 20 MG tablet TAKE 1 TABLET BY MOUTH EVERY DAY  Elham Ayala MD   raNITIdine (ZANTAC) 150 MG tablet TAKE 1 TABLET BY MOUTH TWICE DAILY  Elham Ayala MD   clopidogrel (PLAVIX) 75 MG tablet TAKE 1 TABLET BY MOUTH DAILY  Elham Ayala MD   sertraline (ZOLOFT) 50 MG tablet TAKE 1 TABLET BY MOUTH DAILY  Patient taking differently: Take 25 mg by mouth daily   Elham Ayala MD   lisinopril (PRINIVIL;ZESTRIL) 10 MG tablet TAKE 1 TABLET BY MOUTH DAILY  Elham Ayala MD   blood glucose monitor kit and supplies Test 1 times a day & as needed for symptoms of irregular blood glucose. Elham Ayala MD   Lancets MISC Check Blood sugar 1x/ day  Elham Ayala MD   blood glucose monitor strips Test 1 times a day & as needed for symptoms of irregular blood glucose.   Kacey Landeros MD Trupti   clotrimazole-betamethasone (LOTRISONE) 1-0.05 % cream Apply topically 2 times daily prn rash to back . Avoid using on face. Mike Julio MD   metoprolol succinate (TOPROL XL) 50 MG extended release tablet Take 1 tablet by mouth daily  Mike Julio MD   ranitidine (ZANTAC) 150 MG tablet TAKE 1 TABLET BY MOUTH TWICE DAILY  Patient taking differently: Take by mouth daily   Mike Julio MD   Probiotic Product (PROBIOTIC DAILY PO) Take 1 tablet by mouth daily  Historical Provider, MD   sildenafil (REVATIO) 20 MG tablet Take 1 - 5 pills po prn ED. Mike Julio MD   aspirin 325 MG tablet Take 81 mg by mouth daily   Historical Provider, MD   therapeutic multivitamin-minerals [facility]) tablet Take 1 tablet by mouth daily. Historical Provider, MD   Ascorbic Acid (VITAMIN C) 1000 MG tablet Take 1,000 mg by mouth daily. Historical Provider, MD   vitamin B-12 (CYANOCOBALAMIN) 1000 MCG tablet Take 1,000 mcg by mouth daily. Historical Provider, MD   Magnesium 500 MG TABS Take 1 tablet by mouth daily 750mg  Historical Provider, MD   fish oil-omega-3 fatty acids 1000 MG capsule Take 2 g by mouth daily. Historical Provider, MD   Glucosamine-Chondroitin 750-600 MG TABS Take 1 tablet by mouth 2 times daily. Historical Provider, MD   calcium carbonate 600 MG TABS tablet Take 1 tablet by mouth daily. Historical Provider, MD       Allergies   Allergen Reactions    Amoxicillin Hives and Swelling       Social History     Tobacco Use    Smoking status: Never Smoker    Smokeless tobacco: Never Used   Substance Use Topics    Alcohol use:  Yes     Alcohol/week: 3.0 - 6.0 standard drinks     Types: 3 - 6 Cans of beer per week    Drug use: No        Past Medical History:   Diagnosis Date    AV block, 1st degree     CAD (coronary artery disease) 4/11    Dr. Yahaira Hussein    Calculus of kidney 12/1/2010    Chondromalacia of patella     DDD (degenerative disc disease), lumbar     L2-3; L4-5    DDD (degenerative disc disease), lumbar 04/2019    L1-2; L2-3, L3-4; L4-5; L5-S1    Degeneration of cervical intervertebral disc      Degenerative arthritis of left foot 07/2016    Depression and anxiety      Diabetes mellitus, type 2 ([address] 04/2019    ED (erectile dysfunction)      Esophageal reflux      Lumbar stenosis 02/2020    L2-3; L3-4; L4-5    Obstructive sleep apnea 2003    CPAP    Prediabetes 4/15    Pure hypercholesterolemia      Retinal tears 1980, 1997    Spinal stenosis in cervical region      Testosterone deficiency 4/12    Tricuspid regurgitation      Ventricular tachycardia, nonsustained        Past Surgical History:   Procedure Laterality Date    APPENDECTOMY  1958    CERVICAL FUSION  12/9/2010    ACDF C7-T1with Bilateral C8 Root Decompression, Zero & Impant w/synthesis Removal of C7 screw on Left   [address]    right    KNEE SURGERY  1994    left    KNEE SURGERY  2001    right    PAIN MANAGEMENT PROCEDURE N/A 3/18/2020    BILATERAL L3 TRANSFORAMINAL  EPIDURAL STEROID INJECTION WITH FLUOROSCOPY performed by Anna Marie Sorensen MD at [address]  08/2016    PTCA  4 26 2011    with stent LAD   [facility] Maintenance   Topic Date Due    Diabetic retinal exam  10/07/1960    Shingles Vaccine (2 of 3) 09/02/2013    Annual Wellness Visit (AWV)  05/29/2019    PSA counseling  04/29/2020    Potassium monitoring  10/14/2020    Creatinine monitoring  10/14/2020    Diabetic foot exam  11/12/2020    Diabetic microalbuminuria test  11/12/2020    Lipid screen  01/31/2021    A1C test (Diabetic or Prediabetic)  02/04/2021    Colon cancer screen colonoscopy  05/18/2023    DTaP/Tdap/Td vaccine (3 - Td) 09/26/2027    Flu vaccine  Completed    Pneumococcal 65+ years Vaccine  Completed    Hepatitis C screen  Completed    Hepatitis A vaccine  Aged Out    Hib vaccine  Aged Out    Meningococcal (ACWY) vaccine Aged Out       Family History   Problem Relation Age of Onset    Heart Disease Mother     Heart Disease Father         CAD    Diabetes Father     High Blood Pressure Father     Hypertension Father        PHYSICAL EXAMINATION:    Vital Signs: (As obtained by patient/caregiver or practitioner observation)     Blood pressure= 122/70   Heart rate= 80  Respiratory rate=14 Temperature= 98.4 Weight =217 lbs   Height= 5'9.25\"      Constitutional:  Appears well-developed and well-nourished. No apparent distress                              Mental status  Alert and awake. Oriented to person/place/time. Able to follow commands       Eyes:  EOM intact. Sclera-normal. No erythema of conjunctiva. No eye discharge. HENT:   Normocephalic, atraumatic. Mouth/Throat: normal. Mucous membranes are moist.      External Ears: Normal       Neck: No visualized mass      Pulmonary/Chest:  Respiratory effort normal.  No visualized signs of difficulty breathing or respiratory distress         Musculoskeletal:   Normal gait with no signs of ataxia. Normal range of motion of neck. Neurological:         No Facial Asymmetry (Cranial nerve 7 motor function) (limited exam to video visit) . No gaze palsy              Skin:                    erythematous , papular rash with some vesicles in clusters to left buttock. A linear rash is also seen to left buttock. Psychiatric:          Normal Affect. No Hallucinations           Other pertinent observable physical exam findings:       ASSESSMENT/PLAN:  1. Herpes zoster without complication  - valACYclovir (VALTREX) 1 g tablet; Take 1 tablet by mouth 3 times daily for 7 days  Dispense: 21 tablet; Refill: 0  - Stop Elocon ointment. - discussed contagiousness. The time that was spent with the family/patient addressing care on this video call was 20 minutes. An  electronic signature was used to authenticate this note.     --Najma Kaur MD on 3/31/2020 at 9:15 AM        Pursuant to the emergency declaration under the [address] [place] [address] and the [employer] and [employer] Act, this Virtual  Visit was conducted, with patient's consent, to reduce the patient's risk of exposure to COVID-19 and provide continuity of care for an established patient. Services were provided through a video synchronous discussion virtually to substitute for in-person clinic visit. F/u if no improvement 7d/ prn increased symptoms.

## 2020-04-08 ENCOUNTER — TELEPHONE (OUTPATIENT)
Dept: ORTHOPEDIC SURGERY | Age: 70
End: 2020-04-08

## 2020-04-08 ENCOUNTER — VIRTUAL VISIT (OUTPATIENT)
Dept: ORTHOPEDIC SURGERY | Age: 70
End: 2020-04-08
Payer: MEDICARE

## 2020-04-08 VITALS — HEIGHT: 69 IN | WEIGHT: 214.95 LBS | BODY MASS INDEX: 31.84 KG/M2 | RESPIRATION RATE: 14 BRPM

## 2020-04-08 PROCEDURE — 99214 OFFICE O/P EST MOD 30 MIN: CPT | Performed by: PHYSICIAN ASSISTANT

## 2020-04-08 RX ORDER — ATORVASTATIN CALCIUM 20 MG/1
20 TABLET, FILM COATED ORAL DAILY
Qty: 90 TABLET | Refills: 0 | Status: SHIPPED | OUTPATIENT
Start: 2020-04-08 | End: 2020-07-20

## 2020-04-08 NOTE — PROGRESS NOTES
disease), lumbar 04/2019    L1-2; L2-3, L3-4; L4-5; L5-S1    Degeneration of cervical intervertebral disc      Degenerative arthritis of left foot 07/2016    Depression and anxiety      Diabetes mellitus, type 2 ([address] 04/2019    ED (erectile dysfunction)      Esophageal reflux      Lumbar stenosis 02/2020    L2-3; L3-4; L4-5    Obstructive sleep apnea 2003    CPAP    Prediabetes 4/15    Pure hypercholesterolemia      Retinal tears 1980, 1997    Spinal stenosis in cervical region      Testosterone deficiency 4/12    Tricuspid regurgitation      Ventricular tachycardia, nonsustained       Past Surgical History:     Past Surgical History:   Procedure Laterality Date    APPENDECTOMY  1958    CERVICAL FUSION  12/9/2010    ACDF C7-T1with Bilateral C8 Root Decompression, Zero & Impant w/synthesis Removal of C7 screw on Left   [facility]    right   [facility]    left    KNEE SURGERY  2001    right    PAIN MANAGEMENT PROCEDURE N/A 3/18/2020    BILATERAL L3 TRANSFORAMINAL  EPIDURAL STEROID INJECTION WITH FLUOROSCOPY performed by Irina Martínez MD at [address]  08/2016    PTCA  4 26 2011    with stent LAD    TONSILLECTOMY  1955    VASECTOMY  1989     Current Medications:     Current Outpatient Medications:     atorvastatin (LIPITOR) 20 MG tablet, TAKE 1 TABLET BY MOUTH EVERY DAY, Disp: 90 tablet, Rfl: 0    clopidogrel (PLAVIX) 75 MG tablet, TAKE 1 TABLET BY MOUTH DAILY, Disp: 90 tablet, Rfl: 0    sertraline (ZOLOFT) 50 MG tablet, TAKE 1 TABLET BY MOUTH DAILY (Patient taking differently: Take 25 mg by mouth daily ), Disp: 90 tablet, Rfl: 0    lisinopril (PRINIVIL;ZESTRIL) 10 MG tablet, TAKE 1 TABLET BY MOUTH DAILY, Disp: 90 tablet, Rfl: 0    blood glucose monitor kit and supplies, Test 1 times a day & as needed for symptoms of irregular blood glucose., Disp: 1 kit, Rfl: 0    Lancets MISC, Check Blood sugar 1x/ day, Disp: 100 each, Rfl: 3    blood or redness  GI: Denies nausea, vomiting, diarrhea   : Denies bowel or bladder issues       PHYSICAL EXAM:    Vitals: Resp. rate 14, height 5' 9.49\" (1.765 m), weight 214 lb 15.2 oz (97.5 kg). GENERAL EXAM:  · General Apparence: Patient is adequately groomed with no evidence of malnutrition. · Psychiatric: Orientation: The patient is oriented to time, place and person. The patient's mood and affect are appropriate   · Vascular: Examination reveals no swelling and palpation reveals no tenderness in upper or lower extremities. Good capillary refill. · The lymphatic examination of the neck, axillae and groin reveals all areas to be without enlargement or induration  · Sensation is intact without deficit in the upper and lower extremities to light touch and pinprick  · Coordination of the upper and lower extremities are normal.  · RIGHT UPPER EXTREMITY:  Inspection/examination of the right upper extremity does not show any tenderness, deformity or injury. Range of motion is unremarkable and pain-free. There is no gross instability. There are no rashes, ulcerations or lesions. Strength and tone are normal. No atrophy or abnormal movements are noted. · LEFT UPPER EXTREMITY: Inspection/examination of the left upper extremity does not show any tenderness, deformity or injury. Range of motion is unremarkable and pain-free. There is no gross instability. There are no rashes, ulcerations or lesions. Strength and tone are normal. No atrophy or abnormal movements are noted. LUMBAR/SACRAL EXAMINATION:  · Inspection: Local inspection shows no step-off or bruising. Lumbar alignment is normal. No instability is noted. Shingles rash over the gluteal region. · Palpation:   No evidence of tenderness at the midline. Lumbar paraspinal tenderness: Mild L4/5 and L5/S1 tenderness  Bursal tenderness No tenderness bilaterally  There is no paraspinal spasm.   · Range of Motion: limited by 25% in all planes due to

## 2020-04-09 ENCOUNTER — TELEPHONE (OUTPATIENT)
Dept: ORTHOPEDIC SURGERY | Age: 70
End: 2020-04-09

## 2020-05-06 ENCOUNTER — VIRTUAL VISIT (OUTPATIENT)
Dept: ORTHOPEDIC SURGERY | Age: 70
End: 2020-05-06
Payer: MEDICARE

## 2020-05-06 VITALS — BODY MASS INDEX: 32.58 KG/M2 | WEIGHT: 214.95 LBS | RESPIRATION RATE: 12 BRPM | HEIGHT: 68 IN

## 2020-05-06 PROCEDURE — 3017F COLORECTAL CA SCREEN DOC REV: CPT | Performed by: PHYSICAL MEDICINE & REHABILITATION

## 2020-05-06 PROCEDURE — 99214 OFFICE O/P EST MOD 30 MIN: CPT | Performed by: PHYSICAL MEDICINE & REHABILITATION

## 2020-05-06 PROCEDURE — G8427 DOCREV CUR MEDS BY ELIG CLIN: HCPCS | Performed by: PHYSICAL MEDICINE & REHABILITATION

## 2020-05-06 PROCEDURE — 1123F ACP DISCUSS/DSCN MKR DOCD: CPT | Performed by: PHYSICAL MEDICINE & REHABILITATION

## 2020-05-06 PROCEDURE — 4040F PNEUMOC VAC/ADMIN/RCVD: CPT | Performed by: PHYSICAL MEDICINE & REHABILITATION

## 2020-05-06 NOTE — PROGRESS NOTES
Follow up: [facility] visit (Audio/visual connection present)    Jaren Flanagan  1950  [id number]         Chief Complaint   Patient presents with    Lower Back Pain     F/u LSP; awaiting LESI         HISTORY OF PRESENT ILLNESS:  Mr. Edmond Wilson is a 71 y.o. male returns for a follow up visit for multiple medical problems. His current presenting problems are   1. Lumbar stenosis without neurogenic claudication    2. Lumbar radiculopathy    3. HNP (herniated nucleus pulposus), lumbar    4. DDD (degenerative disc disease), lumbar    . As per information/history obtained from the PADT(patient assessment and documentation tool) - He complains of pain in the lower back with radiation to the hips Bilateral He rates the pain 4/10 and describes it as throbbing. Pain is made worse by: walking. He denies side effects from the current pain regimen. Patient reports that since the last follow up visit the physical functioning is worse, family/social relationships are worse, mood is worse and sleep patterns are worse, and that the overall functioning is worse. Patient denies neurological bowel or bladder. He presents with worsening back and leg pain with activities such as lifting and carrying things. He has not seen a surgeon. He has tried PT with no benefit earlier this year. Chiropractic care aggravated his pain. Associated signs and symptoms:   Neurogenic bowel or bladder symptoms:  no   Perceived weakness:  no   Difficulty walking:  no            Past medical, surgical, social and family history reviewed with the patient.  No pertinent relevant history  Past Medical History:   Past Medical History:   Diagnosis Date    AV block, 1st degree     CAD (coronary artery disease) 4/11    Dr. Edna Bennett    Calculus of kidney 12/1/2010    Chondromalacia of patella     DDD (degenerative disc disease), lumbar     L2-3; L4-5    DDD (degenerative disc disease), lumbar 04/2019    L1-2; L2-3, L3-4; L4-5; L5-S1    dictation was performed with a verbal recognition program (DRAGON) and it was checked for errors. It is possible that there are still dictated errors within this office note. If so, please bring any errors to my attention for an addendum. All efforts were made to ensure that this office note is accurate. Patient has verbally accepted the following: We confirm that, for purposes of billing, this is a virtual visit with the provider for which we will submit a claim for reimbursement with the insurance company. The patient accepts responsibility for any co-pays, coinsurance amounts or other amounts not covered by the insurance company. Patient location: Patient Home in [address] location: [facility]   Time spent: 20 min  Present on the call: myself and patient    Pursuant to the emergency declaration under the [facility] waiver authority and the [employer] General Act, this Virtual  Visit was conducted, with patient's consent, to reduce the patient's risk of exposure to COVID-19 and provide continuity of care for an established patient. Services were provided through a video synchronous discussion virtually to substitute for in-person clinic visit. We have confirmed that, for purposes of billing, this is a virtual visit with for which we will submit a claim for reimbursement with your insurance company. The patient has accepted responsibility for any copays, coinsurance amounts or other amounts not covered by their insurance company. This visit was completed virtually using doxy. me.

## 2020-05-07 NOTE — PROGRESS NOTES
Patient seen at clinic to have a pre op COVID test. Having surgery on 5/13/20 with Dr. Edil Calvillo for an epidural.      Vitals:    T: 98.0  O2: 95  HR: 96

## 2020-05-08 LAB
SARS-COV-2: NOT DETECTED
SOURCE: NORMAL

## 2020-05-13 ENCOUNTER — HOSPITAL ENCOUNTER (OUTPATIENT)
Age: 70
Setting detail: OUTPATIENT SURGERY
Discharge: HOME | End: 2020-05-13
Attending: PHYSICAL MEDICINE & REHABILITATION | Admitting: PHYSICAL MEDICINE & REHABILITATION
Payer: MEDICARE

## 2020-05-13 ENCOUNTER — APPOINTMENT (OUTPATIENT)
Dept: GENERAL RADIOLOGY | Age: 70
End: 2020-05-13
Attending: PHYSICAL MEDICINE & REHABILITATION
Payer: MEDICARE

## 2020-05-13 VITALS
RESPIRATION RATE: 14 BRPM | DIASTOLIC BLOOD PRESSURE: 83 MMHG | HEIGHT: 69 IN | HEART RATE: 52 BPM | BODY MASS INDEX: 30.21 KG/M2 | WEIGHT: 204 LBS | OXYGEN SATURATION: 100 % | TEMPERATURE: 97.3 F | SYSTOLIC BLOOD PRESSURE: 141 MMHG

## 2020-05-13 LAB
GLUCOSE BLD-MCNC: 138 MG/DL (ref 70–99)
PERFORMED ON: ABNORMAL

## 2020-05-13 PROCEDURE — 2500000003 HC RX 250 WO HCPCS: Performed by: PHYSICAL MEDICINE & REHABILITATION

## 2020-05-13 PROCEDURE — 99152 MOD SED SAME PHYS/QHP 5/>YRS: CPT | Performed by: PHYSICAL MEDICINE & REHABILITATION

## 2020-05-13 PROCEDURE — 3610000056 HC PAIN LEVEL 4 BASE (NON-OR): Performed by: PHYSICAL MEDICINE & REHABILITATION

## 2020-05-13 PROCEDURE — 6360000002 HC RX W HCPCS: Performed by: PHYSICAL MEDICINE & REHABILITATION

## 2020-05-13 PROCEDURE — 3209999900 FLUORO FOR SURGICAL PROCEDURES

## 2020-05-13 PROCEDURE — 2709999900 HC NON-CHARGEABLE SUPPLY: Performed by: PHYSICAL MEDICINE & REHABILITATION

## 2020-05-13 RX ORDER — MIDAZOLAM HYDROCHLORIDE 1 MG/ML
INJECTION INTRAMUSCULAR; INTRAVENOUS
Status: COMPLETED | OUTPATIENT
Start: 2020-05-13 | End: 2020-05-13

## 2020-05-13 RX ORDER — BUPIVACAINE HYDROCHLORIDE 5 MG/ML
INJECTION, SOLUTION EPIDURAL; INTRACAUDAL
Status: COMPLETED | OUTPATIENT
Start: 2020-05-13 | End: 2020-05-13

## 2020-05-13 RX ORDER — FENTANYL CITRATE 50 UG/ML
INJECTION, SOLUTION INTRAMUSCULAR; INTRAVENOUS
Status: COMPLETED | OUTPATIENT
Start: 2020-05-13 | End: 2020-05-13

## 2020-05-13 RX ORDER — BETAMETHASONE SODIUM PHOSPHATE AND BETAMETHASONE ACETATE 3; 3 MG/ML; MG/ML
INJECTION, SUSPENSION INTRA-ARTICULAR; INTRALESIONAL; INTRAMUSCULAR; SOFT TISSUE
Status: COMPLETED | OUTPATIENT
Start: 2020-05-13 | End: 2020-05-13

## 2020-05-13 ASSESSMENT — PAIN - FUNCTIONAL ASSESSMENT
PAIN_FUNCTIONAL_ASSESSMENT: 0-10
PAIN_FUNCTIONAL_ASSESSMENT: PREVENTS OR INTERFERES SOME ACTIVE ACTIVITIES AND ADLS

## 2020-05-13 ASSESSMENT — PAIN SCALES - GENERAL
PAINLEVEL_OUTOF10: 0
PAINLEVEL_OUTOF10: 0

## 2020-05-13 ASSESSMENT — PAIN DESCRIPTION - DESCRIPTORS: DESCRIPTORS: ACHING;DULL

## 2020-05-13 NOTE — H&P
HISTORY AND PHYSICAL/PRE-SEDATION ASSESSMENT    Patient:  Vishnu Bradford   :  1950  Medical Record No.:  [id number]   Date:  2020  Physician:  Dennis Gan M.D. Facility: [address] [place] [address]    HISTORY OF PRESENT ILLNESS:                 The patient is a 71 y.o. male whom presents with lower back and leg pain. Review of the imaging and physical exam of the patient confirmed the pre-procedure diagnosis. After a thorough discussion of risks, benefits and alternatives informed consent was obtained.                 Past Medical History:   Past Medical History:   Diagnosis Date    AV block, 1st degree     CAD (coronary artery disease)     Dr. Clare Abraham    Calculus of kidney 2010    Chondromalacia of patella     DDD (degenerative disc disease), lumbar     L2-3; L4-5    DDD (degenerative disc disease), lumbar 2019    L1-2; L2-3, L3-4; L4-5; L5-S1    Degeneration of cervical intervertebral disc      Degenerative arthritis of left foot 2016    Depression and anxiety      Diabetes mellitus, type 2 ([address] 2019    diet controlled    ED (erectile dysfunction)      Esophageal reflux      Lumbar stenosis 2020    L2-3; L3-4; L4-5    Obstructive sleep apnea     CPAP    Prediabetes 4/15    Pure hypercholesterolemia      Retinal tears 1997    Spinal stenosis in cervical region      Testosterone deficiency     Tricuspid regurgitation      Ventricular tachycardia, nonsustained       Past Surgical History:     Past Surgical History:   Procedure Laterality Date    APPENDECTOMY      CERVICAL FUSION  2010    ACDF C7-T1with Bilateral C8 Root Decompression, Zero & Impant w/synthesis Removal of C7 screw on Left   [address]    right    KNEE SURGERY      left    KNEE SURGERY      right    PAIN MANAGEMENT PROCEDURE N/A 3/18/2020    BILATERAL L3 TRANSFORAMINAL  EPIDURAL STEROID INJECTION WITH FLUOROSCOPY performed by Adrienne Max

## 2020-05-28 ENCOUNTER — OFFICE VISIT (OUTPATIENT)
Dept: ORTHOPEDIC SURGERY | Age: 70
End: 2020-05-28
Payer: MEDICARE

## 2020-05-28 VITALS
HEIGHT: 69 IN | WEIGHT: 203.93 LBS | SYSTOLIC BLOOD PRESSURE: 126 MMHG | BODY MASS INDEX: 30.2 KG/M2 | DIASTOLIC BLOOD PRESSURE: 79 MMHG

## 2020-05-28 PROCEDURE — 4040F PNEUMOC VAC/ADMIN/RCVD: CPT | Performed by: PHYSICIAN ASSISTANT

## 2020-05-28 PROCEDURE — 1036F TOBACCO NON-USER: CPT | Performed by: PHYSICIAN ASSISTANT

## 2020-05-28 PROCEDURE — 1123F ACP DISCUSS/DSCN MKR DOCD: CPT | Performed by: PHYSICIAN ASSISTANT

## 2020-05-28 PROCEDURE — 99213 OFFICE O/P EST LOW 20 MIN: CPT | Performed by: PHYSICIAN ASSISTANT

## 2020-05-28 PROCEDURE — G8427 DOCREV CUR MEDS BY ELIG CLIN: HCPCS | Performed by: PHYSICIAN ASSISTANT

## 2020-05-28 PROCEDURE — 3017F COLORECTAL CA SCREEN DOC REV: CPT | Performed by: PHYSICIAN ASSISTANT

## 2020-05-28 PROCEDURE — G8417 CALC BMI ABV UP PARAM F/U: HCPCS | Performed by: PHYSICIAN ASSISTANT

## 2020-05-28 NOTE — PROGRESS NOTES
Follow up: [facility]  1950  [id number]         Chief Complaint   Patient presents with    Lower Back Pain     F/u Right L4 & Left L4 TF 5/13         HISTORY OF PRESENT ILLNESS:  Mr. Joan North is a 71 y.o. male returns for a follow up visit for multiple medical problems. His current presenting problems are   1. Lumbar radiculopathy    2. HNP (herniated nucleus pulposus), lumbar    3. DDD (degenerative disc disease), lumbar    4. Lumbar stenosis without neurogenic claudication    . As per information/history obtained from the PADT(patient assessment and documentation tool) - He complains of pain in the lower back with radiation to the hips Bilateral He rates the pain 4/10 and describes it as dull, aching. Pain is made worse by: standing, sitting. He denies side effects from the current pain regimen. Patient reports that since the last follow up visit the physical functioning is better, family/social relationships are better, mood is better and sleep patterns are better, and that the overall functioning is better. Patient denies neurological bowel or bladder. The patient presents today in the office as a follow-up following a bilateral L4 transforaminal epidural steroid injection which was completed on 5/13/2020. The patient describes that he received 20% relief. He did receive 100% relief for approximately 2 days however the pain did return. The patient also had a bilateral L3 transforaminal epidural steroid injection on 3/18/2020 which did not help at all. The patient continues to have pain at a 4/10 in severity. The patient describes that standing, prolonged sitting, or carrying weight increases his pain and walking, and ice relieves his pain. He is still able to walk 5 or so miles however he would like to have this pain relieved completely. The patient can sit for an hour and a half, stand for 10 minutes, and walk for 2 hours without pain.   The patient is hoping that he could at 4 26 2011    with stent LAD    TONSILLECTOMY  1955    VASECTOMY  1989     Current Medications:     Current Outpatient Medications:     atorvastatin (LIPITOR) 20 MG tablet, Take 1 tablet by mouth daily, Disp: 90 tablet, Rfl: 0    clopidogrel (PLAVIX) 75 MG tablet, TAKE 1 TABLET BY MOUTH DAILY, Disp: 90 tablet, Rfl: 0    lisinopril (PRINIVIL;ZESTRIL) 10 MG tablet, TAKE 1 TABLET BY MOUTH DAILY, Disp: 90 tablet, Rfl: 0    blood glucose monitor kit and supplies, Test 1 times a day & as needed for symptoms of irregular blood glucose., Disp: 1 kit, Rfl: 0    Lancets MISC, Check Blood sugar 1x/ day, Disp: 100 each, Rfl: 3    blood glucose monitor strips, Test 1 times a day & as needed for symptoms of irregular blood glucose., Disp: 100 strip, Rfl: 5    metoprolol succinate (TOPROL XL) 50 MG extended release tablet, Take 1 tablet by mouth daily, Disp: 90 tablet, Rfl: 0    Probiotic Product (PROBIOTIC DAILY PO), Take 1 tablet by mouth daily, Disp: , Rfl:     sildenafil (REVATIO) 20 MG tablet, Take 1 - 5 pills po prn ED., Disp: 90 tablet, Rfl: 1    aspirin 325 MG tablet, Take 81 mg by mouth daily , Disp: , Rfl:     therapeutic multivitamin-minerals (THERAGRAN-M) tablet, Take 1 tablet by mouth daily. , Disp: , Rfl:     Ascorbic Acid (VITAMIN C) 1000 MG tablet, Take 1,000 mg by mouth daily. , Disp: , Rfl:     vitamin B-12 (CYANOCOBALAMIN) 1000 MCG tablet, Take 1,000 mcg by mouth daily. , Disp: , Rfl:     Magnesium 500 MG TABS, Take 1 tablet by mouth daily 750mg, Disp: , Rfl:     fish oil-omega-3 fatty acids 1000 MG capsule, Take 2 g by mouth daily. , Disp: , Rfl:     Glucosamine-Chondroitin 750-600 MG TABS, Take 1 tablet by mouth 2 times daily. , Disp: , Rfl:     calcium carbonate 600 MG TABS tablet, Take 1 tablet by mouth daily. , Disp: , Rfl:   Allergies:  Amoxicillin  Social History:    reports that he has never smoked.  He has never used smokeless tobacco. He reports current alcohol use of about 3.0 - scheduled appointment if he has concerns related to his condition or if he needs assistance in scheduling the above tests. He is welcome to call for an appointment sooner if he has any additional concerns or questions. ILakeisha, am scribing for [employer].  05/28/20 1:32 PM Lakeisha Medrano. The physical examination was performed between the patient and [employer] Combs. All counseling during the appointment was performed between the patient and the provider. Yoan Vera PA-C, personally performed the services described in this documentation as scribed by Lakeisha Duran ATC in my presence and it is both accurate and complete. CHARITY Mena, SHIMON  Board Certified by the [employer] on Certification of Physician Assistants  Stefanie Hernandez   Partner of [facility])               This dictation was performed with a verbal recognition program [facility]) and it was checked for errors. It is possible that there are still dictated errors within this office note. If so, please bring any errors to my attention for an addendum. All efforts were made to ensure that this office note is accurate.

## 2020-05-30 LAB — PROSTATE SPECIFIC ANTIGEN: 5.86 NG/ML (ref 0–4)

## 2020-06-03 ENCOUNTER — HOSPITAL ENCOUNTER (OUTPATIENT)
Dept: PHYSICAL THERAPY | Age: 70
Setting detail: THERAPIES SERIES
Discharge: HOME | End: 2020-06-03
Payer: MEDICARE

## 2020-06-03 PROCEDURE — 97112 NEUROMUSCULAR REEDUCATION: CPT | Performed by: PHYSICAL THERAPIST

## 2020-06-03 PROCEDURE — 97161 PT EVAL LOW COMPLEX 20 MIN: CPT | Performed by: PHYSICAL THERAPIST

## 2020-06-03 NOTE — FLOWSHEET NOTE
[facility]    Physical Therapy Daily Treatment Note  Date:  6/3/2020    Patient Name:  Lavaun Siemens    :  1950  MRN: [id number]  Restrictions/Precautions:    Medical/Treatment Diagnosis Information:  · Diagnosis: M48.061 (ICD-10-CM) - Lumbar stenosis without neurogenic claudication; M51.36 (ICD-10-CM) - DDD (degenerative disc disease), lumbar; M51.26 (ICD-10-CM) - HNP (herniated nucleus pulposus), lumbar; M54.16 (ICD-10-CM) - Lumbar radiculopathy  · Treatment Diagnosis: R53.1, M54.5; glute weakness, improper posterior chain firing  Insurance/Certification information:  PT Insurance Information: MEDICARE  Physician Information:  Referring Practitioner: Dr. Frantz Hilton  Has the plan of care been signed (Y/N):        []  Yes  [x]  No     Date of Patient follow up with Physician: Dr. Neva Denis       Is this a Progress Report:     []  Yes  [x]  No        If Yes:  Date Range for reporting period:  Beginning  Ending    Progress report will be due (10 Rx or 30 days whichever is less): 54      Recertification will be due (POC Duration  / 90 days whichever is less): 7/15/20         Visit # Insurance Allowable Auth Required   1 MEDICARE []  Yes []  No        Functional Scale:   Date assessed:   Sharda Frazier =14% deficit  6/3/20    Latex Allergy:  [x]NO      []YES  Preferred Language for Healthcare:   [x]English       []other:    Pain level:  10     SUBJECTIVE:  See eval    OBJECTIVE:   ROM   Comments   Trunk flexion 90     Trunk extension 30     Trunk R sidebend P!  Finger tips 3 inches from lateral joint line (knee)     Trunk L sidebend Finger tips 2 inches from lateral joint line (knee)     Trunk R rotation Limited by 50%     Trunk L rotation Limited by 50%     HS flexibility SLR 55 deg B                        Strength Left Right Comments   Hip flexion(L2) 4/5 4+/5     Knee extension(L3) 4/5 4+/5     Knee flexion(S1-2) 4+/5 4+/5     Ankle dorsiflexion(L4) 4+/5 4+/5     Toe extension(L5) 4+/5 4+/5     Ankle eversion/plantar flexion(S1) 5/5 5/5     Hip abd  4+/5 glute med  3+/5 glute min 4+/5 glute med  4-/5 glute min        Special tests   Comments   SLR -     Slump test -     Pelvic symmetry        Segmental Spinal mobility Hypomobile, P to A, at lumbar and thoracic spine     Heel walk    Not assessed   Toe walk    Not assessed   Tandem walk    Not assessed              DTRs Left Right Comments   Patellar(L3-L4)         Achilles(S1-S2)                      Joint mobility:               []? Normal               [x]? Hypo              []? Hyper     Palpation: TTP B piriformis     Functional Mobility/Transfers: Indepdent     Posture: WNL     Gait: B out toeing/hip ER        NM control: improper firing pattern of posterior chain with HS firing before glutes    RESTRICTIONS/PRECAUTIONS: Avoid lumbar extension    Exercises/Interventions:   ROM/stretches     Seated figure 4 HEP    Standing HS HEP                                  Strengthening     Glute sets 10x10\" bilateral  10x10\" unilateral, B    Clamshells 10x10\" neutral hip, B                                                Manual Intervention             Prone PA      GISTM/STM      Lumbar Manip      SI Manip      Hip belt mobs      Hip LA distraction              Therapeutic Exercise and NMR EXR  [x] (76471) Provided verbal/tactile cueing for activities related to strengthening, flexibility, endurance, ROM  for improvements in proximal hip and core control with self care, mobility, lifting and ambulation.  [] (05740) Provided verbal/tactile cueing for activities related to improving balance, coordination, kinesthetic sense, posture, motor skill, proprioception  to assist with core control in self care, mobility, lifting, and ambulation.      Therapeutic Activities:    [] (23148 or 79514) Provided verbal/tactile cueing for activities related to improving balance, coordination, kinesthetic sense, posture, Patient:   Short Term Goals: To be achieved in: 2 weeks 6/17/20  1. Independent in HEP and progression per patient tolerance, in order to prevent re-injury. []? Progressing: []? Met: []? Not Met: []? Adjusted   2. Patient will have a decrease in pain to facilitate improvement in movement, function, and ADLs as indicated by Functional Deficits. []? Progressing: []? Met: []? Not Met: []? Adjusted      Long Term Goals: To be achieved in: 6 weeks 7/15/20  1. Disability index score of 7% or less for the [place] to assist with reaching prior level of function. []? Progressing: []? Met: []? Not Met: []? Adjusted  2. Patient will demonstrate increased AROM to [facility], given stenotic changes to allow for proper joint functioning as indicated by patients Functional Deficits.   []? Progressing: []? Met: []? Not Met: []? Adjusted  3. Patient will demonstrate an increase in B glute strength to 4/5 or greater to allow for proper functional mobility as indicated by patients Functional Deficits. []? Progressing: []? Met: []? Not Met: []? Adjusted  4. Patient will return to stair negotiation and lifting without increased symptoms or restriction. []? Progressing: []? Met: []? Not Met: []? Adjusted  5. Pt will tolerate progressive return to tennis    []? Progressing: []? Met: []? Not Met: []? Adjusted      Overall Progression Towards Functional goals/ Treatment Progress Update:  [] Patient is progressing as expected towards functional goals listed. [] Progression is slowed due to complexities/Impairments listed. [] Progression has been slowed due to co-morbidities.   [x] Plan just implemented, too soon to assess goals progression <30days   [] Goals require adjustment due to lack of progress  [] Patient is not progressing as expected and requires additional follow up with physician  [] Other    Prognosis for POC: [x] Good [] Fair  [] Poor      Patient requires continued skilled intervention: [x] Yes  [] No    Treatment/Activity Tolerance:  [x] Patient able to complete treatment  [] Patient limited by fatigue  [] Patient limited by pain     [] Patient limited by other medical complications  [x] Other:  Pt tolerated treatment well. Demonstrates significant glute min weakness and improper firing pattern of posterior chain with HS firing before glutes. PT to focus on glute and posterior chain reeducation as well as strength. Pt reported fatigue with exercises and no exacerbation of glute symptoms. Patient education:  6/3 Pt educated on diagnosis, prognosis and PT plan of care. Educated on [address] [place] [address] Road. Pt questions were addressed and answered. Prognosis: [] Good [] Fair  [] Poor    Patient Requires Follow-up: [x] Yes  [] No    PLAN: See eval  [] Continue per plan of care [] Alter current plan (see comments)  [x] Plan of care initiated [] Hold pending MD visit [] Discharge    Electronically signed by: Lissett Reed PT, DPT  Physical Therapist  [id number]  [email or web address]    *If patient does not return for further follow ups after this date. Please consider this as the patients discharge from physical therapy.

## 2020-06-03 NOTE — PLAN OF CARE
year and decided he really needed to figure out what was wrong. Went back to PCP and had an MRI which showed the \"damage\" in his back. Was referred to Dr. Madelaine Doe at that point. He has received injections at L3 on 3/18 that did not help and L4 on 5/13 which helped significantly for the first 2 days, but the pain has started to return. Pt has been referred to Dr. rKystal Broussard by Dr. Madelaine Doe for surgical consult. Scheduled for 6/9/20. Pt continues to walk 5 miles per day, and does not have any pain. If he decides to run it is painful, even a 1/4 mile. Walking up stairs, lifting heavier things (luggage) are aggravating. Pt states that the pain is still in his glutes, the piriformis is really tender to the touch. \"Not excruciating pain where I can't do anything. \" Pt has continued to stretch. Pt does feel pain radiate into posterior thigh, sharp in nature, does sometimes feel like it is going to his toes but states that this is \"rare\". Pt states that pain in the glutes is his primary complaint- R is more noticeable, states he really doesn't feel pain in his back. Pt states that his back feels like it gets tired when he is sitting unsupported or standing/walking. Pt states that early on standing at all was really painful, even to brush teeth but that has gone away, feels that is where the L4 shot has helped the most.    Pt is icing. He has not been taking any medications. Relevant Medical History: see intake form  Functional Disability Index/G-Codes:   Delvinjikistluis 14/100=14% deficit    Pain Scale: 4/10 on average, 10/10 for sharp pain (very rare)  Easing factors: walking, icing  Provocative factors: running, lifting, walking up stairs,     Type: []Constant           []Intermittent      []Radiating         []Localized         []other:                Numbness/Tingling: + in toes, has been present for several years     Occupation/School: Retired     Living Status/Prior Level of Function: Independent with ADLs and IADLs. Musculoskeletal conditions   []Disc pathology   []Congenital spine pathologies   []Prior surgical intervention  []Osteoporosis (M81.8)  []Osteopenia (M85.8)   Endocrine conditions   []Hypothyroid (E03.9)  []Hyperthyroid Gastrointestinal conditions   []Constipation (O16.11)    Metabolic conditions   []Morbid obesity (E66.01)  []Diabetes type 1(E10.65) or 2 (E11.65)   []Neuropathy (G60.9)      Pulmonary conditions   []Asthma (J45)  []Coughing   []COPD (J44.9)    Psychological Disorders  []Anxiety (F41.9)  []Depression (F32.9)   []Other:    [x]Other:  stenosis         Barriers to/and or personal factors that will affect rehab potential:              []Age  []Sex              [x]Motivation                        [x]Co-Morbidities              []Cognitive Function, education/learning barriers              []Environmental, home barriers              []profession/work barriers  []past PT/medical experience  []other:  Justification: Pt is very active and very motivated; however, has degenerative changes of the spine     Falls Risk Assessment (30 days):   [x] Falls Risk assessed and no intervention required.   [] Falls Risk assessed and Patient requires intervention due to being higher risk   TUG score (>12s at risk):     [] Falls education provided, including      ASSESSMENT:   Functional Impairments:                [x]Noted lumbar/proximal hip hypomobility              []Noted lumbosacral and/or generalized hypermobility              [x]Decreased Lumbosacral/hip/LE functional ROM              [x]Decreased core/proximal hip strength and neuromuscular control               [x]Decreased LE functional strength               []Abnormal reflexes/sensation/myotomal/dermatomal deficits  [x]Reduced balance/proprioceptive control               []other:       Functional Activity Limitations (from functional questionnaire and intake)              [x]Reduced ability to tolerate prolonged functional positions              [x]Reduced ability or difficulty with changes of positions or transfers between positions              []Reduced ability to maintain good posture and demonstrate good body mechanics with sitting, bending, and lifting              []Reduced ability to sleep              [] Reduced ability or tolerance with driving and/or computer work              [x]Reduced ability to perform lifting, reaching, carrying tasks              []Reduced ability to squat              []Reduced ability to forward bend              []Reduced ability to ambulate prolonged functional periods/distances/surfaces              [x]Reduced ability to ascend/descend stairs              []other:       Participation Restrictions              []Reduced participation in self care activities              []Reduced participation in home management activities              []Reduced participation in work activities              [x]Reduced participation in social activities. [x]Reduced participation in sport/recreational activities. Classification:              []Signs/symptoms consistent with Lumbar instability/stabilization subgroup. []Signs/symptoms consistent with Lumbar mobilization/manipulation subgroup, myotomes and dermatomes intact. Meets manipulation criteria. []Signs/symptoms consistent with Lumbar direction specific/centralization subgroup              []Signs/symptoms consistent with Lumbar traction subgroup                            []Signs/symptoms consistent with lumbar facet dysfunction              [x]Signs/symptoms consistent with lumbar stenosis type dysfunction              []Signs/symptoms consistent with nerve root involvement including myotome & dermatome dysfunction              []Signs/symptoms consistent with post-surgical status including: decreased ROM, strength and function.               []signs/symptoms consistent with pathology which may benefit from Dry needling                []other: Prognosis/Rehab Potential:                                       []Excellent              [x]Good                 []Fair              []Poor     Tolerance of evaluation/treatment:               []Excellent              [x]Good                 []Fair              []Poor     Physical Therapy Evaluation Complexity Justification  [x] A history of present problem with:  [] no personal factors and/or comorbidities that impact the plan of care;  [x]1-2 personal factors and/or comorbidities that impact the plan of care  []3 personal factors and/or comorbidities that impact the plan of care  [x] An examination of body systems using standardized tests and measures addressing any of the following: body structures and functions (impairments), activity limitations, and/or participation restrictions;:  [] a total of 1-2 or more elements   [] a total of 3 or more elements   [x] a total of 4 or more elements   [x] A clinical presentation with:  [x] stable and/or uncomplicated characteristics   [] evolving clinical presentation with changing characteristics  [] unstable and unpredictable characteristics;   [x] Clinical decision making of [x] low, [] moderate, [] high complexity using standardized patient assessment instrument and/or measurable assessment of functional outcome. [x] EVAL (LOW) 40755 (typically 20 minutes face-to-face)  [] EVAL (MOD) 88188 (typically 30 minutes face-to-face)  [] EVAL (HIGH) 79356 (typically 45 minutes face-to-face)  [] RE-EVAL            PLAN:      Frequency/Duration:  1-2 days per week for 6 Weeks:  Interventions:  [x]  Therapeutic exercise including: strength training, ROM, for LE, Glutes and core   [x]  NMR activation and proprioception for glutes , LE and Core   [x]  Manual therapy as indicated for Hip complex, LE and spine to include: Dry Needling/IASTM, STM, PROM, Gr I-IV mobilizations, manipulation.              [x]  Modalities as needed that may include: thermal agents, E-stim,

## 2020-06-09 ENCOUNTER — OFFICE VISIT (OUTPATIENT)
Dept: ORTHOPEDIC SURGERY | Age: 70
End: 2020-06-09
Payer: MEDICARE

## 2020-06-09 VITALS — WEIGHT: 203.93 LBS | BODY MASS INDEX: 30.2 KG/M2 | HEIGHT: 69 IN

## 2020-06-09 PROCEDURE — G8427 DOCREV CUR MEDS BY ELIG CLIN: HCPCS | Performed by: PHYSICIAN ASSISTANT

## 2020-06-09 PROCEDURE — 99213 OFFICE O/P EST LOW 20 MIN: CPT | Performed by: PHYSICIAN ASSISTANT

## 2020-06-09 PROCEDURE — G8417 CALC BMI ABV UP PARAM F/U: HCPCS | Performed by: PHYSICIAN ASSISTANT

## 2020-06-09 NOTE — PROGRESS NOTES
blood glucose. 1 kit 0    Lancets MISC Check Blood sugar 1x/ day 100 each 3    blood glucose monitor strips Test 1 times a day & as needed for symptoms of irregular blood glucose. 100 strip 5    metoprolol succinate (TOPROL XL) 50 MG extended release tablet Take 1 tablet by mouth daily 90 tablet 0    Probiotic Product (PROBIOTIC DAILY PO) Take 1 tablet by mouth daily      sildenafil (REVATIO) 20 MG tablet Take 1 - 5 pills po prn ED. 90 tablet 1    aspirin 325 MG tablet Take 81 mg by mouth daily       therapeutic multivitamin-minerals (THERAGRAN-M) tablet Take 1 tablet by mouth daily.  Ascorbic Acid (VITAMIN C) 1000 MG tablet Take 1,000 mg by mouth daily.  vitamin B-12 (CYANOCOBALAMIN) 1000 MCG tablet Take 1,000 mcg by mouth daily.  Magnesium 500 MG TABS Take 1 tablet by mouth daily 750mg      fish oil-omega-3 fatty acids 1000 MG capsule Take 2 g by mouth daily.  Glucosamine-Chondroitin 750-600 MG TABS Take 1 tablet by mouth 2 times daily.  calcium carbonate 600 MG TABS tablet Take 1 tablet by mouth daily. No current facility-administered medications for this visit. Review of symptoms:  Patient's review of symptoms was reviewed and is significant for back pain and negative for recent weight loss, fatigue, chills, visual disturbances, blood in stool or urine, recent infection, chest pain, or shortness of breath. All other ROS were negative. Physical examination:  Mr. Deepak Del Valle's most recent vitals:  Vitals  Height: 5' 9.02\" (175.3 cm)  Weight: 203 lb 14.8 oz (92.5 kg)  Body mass index is 30.1 kg/m². General exam:  He is well-developed and well-nourished, is in obvious pain and alert and oriented to person, place, and time. He demonstrates appropriate mood and affect. His skin is warm and dry. His gait is normal and he walks heel to toe without limp or instability. Back:  He stands with slight lumbar flexion.  His lumbar flexion, extension and lateral bending are moderately reduced with pain. He has mild tenderness over his lumbar spine without obvious muscle spasm. The skin over his lumbar spine is normal without a surgical scar. Lower extremities:  He has 5/5 motor strength of bilateral lower extremities. He has a negative straight leg raise, bilaterally. Deep tendon reflexes at knees and achilles are 2+. Sensation is intact to light touch L3 to S1 bilaterally. He has no clonus. Hip range of motion painless. Imaging:  I reviewed MRI images of his lumbar spine from 2/10/20. They note mild to moderate central canal stenosis L2-3 with compressive central disc extrusion at L3-4. Moderate stenosis L4-5. Non compressive small right paracentral disc protrusion L5-S1. Assessment:  Lumbar stenosis with neurogenic claudication  Lumbar HNP    Plan:  We discussed treatment options including observation, gabapentin, physical therapy, epidural injection and microlumbar decompression. He would like to proceed with additional conservative treatment and increasing activity. He will call the office if he would like to have microlumbar decompression L3-4. Dr. Misty Meade discussed the risks, benefits, and alternatives to surgery including the risks of nerve or vessel injury, paralysis, spinal blood clot, spinal fluid leak, death, infection, need for additional surgery to remove or reposition rods, screws, or cages, adjacent level arthritis failure of surgery to alleviate his symptoms and worse symptoms following surgery. He understands these risks and wishes to proceed with surgery. His questions were answered. Dictated by Ingrid Lo PA-C. Patient also seen and examined by Dr. Misty Meade.

## 2020-06-10 ENCOUNTER — HOSPITAL ENCOUNTER (OUTPATIENT)
Dept: PHYSICAL THERAPY | Age: 70
Setting detail: THERAPIES SERIES
Discharge: HOME | End: 2020-06-10
Payer: MEDICARE

## 2020-06-10 ENCOUNTER — TELEPHONE (OUTPATIENT)
Dept: ORTHOPEDIC SURGERY | Age: 70
End: 2020-06-10

## 2020-06-10 PROCEDURE — 97110 THERAPEUTIC EXERCISES: CPT | Performed by: PHYSICAL THERAPIST

## 2020-06-10 PROCEDURE — 97112 NEUROMUSCULAR REEDUCATION: CPT | Performed by: PHYSICAL THERAPIST

## 2020-06-10 NOTE — FLOWSHEET NOTE
[facility]    Physical Therapy Daily Treatment Note  Date:  6/10/2020    Patient Name:  oJe Siu    :  1950  MRN: [id number]  Restrictions/Precautions:    Medical/Treatment Diagnosis Information:  · Diagnosis: M48.061 (ICD-10-CM) - Lumbar stenosis without neurogenic claudication; M51.36 (ICD-10-CM) - DDD (degenerative disc disease), lumbar; M51.26 (ICD-10-CM) - HNP (herniated nucleus pulposus), lumbar; M54.16 (ICD-10-CM) - Lumbar radiculopathy  · Treatment Diagnosis: R53.1, M54.5; glute weakness, improper posterior chain firing  Insurance/Certification information:  PT Insurance Information: MEDICARE  Physician Information:  Referring Practitioner: Dr. Madelaine Doe  Has the plan of care been signed (Y/N):        []  Yes  [x]  No     Date of Patient follow up with Physician: Dr. Krystal Broussard       Is this a Progress Report:     []  Yes  [x]  No        If Yes:  Date Range for reporting period:  Beginning  Ending    Progress report will be due (10 Rx or 30 days whichever is less): 13      Recertification will be due (POC Duration  / 90 days whichever is less): 9/3/20         Visit # Insurance Allowable Auth Required   2 MEDICARE []  Yes [x]  No        Functional Scale:   Date assessed:   Jairo Tarrant =14% deficit  6/3/20    Latex Allergy:  [x]NO      []YES  Preferred Language for Healthcare:   [x]English       []other:    Pain level:  4/10     SUBJECTIVE: Pt saw Dr. Krystal Broussard yesterday. Pt states that he discussed surgery with him, lumbar decompression. Pt states that the PA said they would talk to him about gabapentin but states they forgot to do so. Pt states that he would like to seek 2nd opinion, just for peace of mind. Pt states that he has been feeling okay since last week, he overdid it over the weekend so his back is a bit sore, he was at his cottage in [place] and he was trimming lilac bushes for about 5 hours.      OBJECTIVE:   ROM   Comments

## 2020-06-10 NOTE — TELEPHONE ENCOUNTER
Physical therapy would like a new order for physical therapy for Dry needling done.      Order has been placed

## 2020-06-17 ENCOUNTER — HOSPITAL ENCOUNTER (OUTPATIENT)
Dept: PHYSICAL THERAPY | Age: 70
Setting detail: THERAPIES SERIES
Discharge: HOME | End: 2020-06-17
Payer: MEDICARE

## 2020-06-17 PROCEDURE — 97112 NEUROMUSCULAR REEDUCATION: CPT | Performed by: PHYSICAL THERAPIST

## 2020-06-17 PROCEDURE — 97110 THERAPEUTIC EXERCISES: CPT | Performed by: PHYSICAL THERAPIST

## 2020-06-17 PROCEDURE — 20560 NDL INSJ W/O NJX 1 OR 2 MUSC: CPT | Performed by: PHYSICAL THERAPIST

## 2020-06-17 PROCEDURE — 97140 MANUAL THERAPY 1/> REGIONS: CPT | Performed by: PHYSICAL THERAPIST

## 2020-06-17 RX ORDER — LISINOPRIL 10 MG/1
10 TABLET ORAL DAILY
Qty: 90 TABLET | Refills: 0 | Status: SHIPPED | OUTPATIENT
Start: 2020-06-17 | End: 2020-06-26

## 2020-06-17 NOTE — FLOWSHEET NOTE
Provided verbal/tactile cueing for activities related to improving balance, coordination, kinesthetic sense, posture, motor skill, proprioception  to assist with core control in self care, mobility, lifting, and ambulation. Therapeutic Activities:    [] (01861 or 57131) Provided verbal/tactile cueing for activities related to improving balance, coordination, kinesthetic sense, posture, motor skill, proprioception and motor activation to allow for proper function  with self care and ADLs  [] (12759) Provided training and instruction to the patient for proper core and proximal hip recruitment and positioning with ambulation re-education     Home Exercise Program:    [address] access code: [email or web address]   Initiated 6/3/20. Printed hand out given. Pt demonstrated proper form of each exercise and expressed verbal understanding of frequency and duration. Updated 6/10/20. Printed hand out provided. Added prone heel squeeze and hip hike. [x] (74688) Reviewed/Progressed HEP activities related to strengthening, flexibility, endurance, ROM of core, proximal hip and LE for functional self-care, mobility, lifting and ambulation   [] (30234) Reviewed/Progressed HEP activities related to improving balance, coordination, kinesthetic sense, posture, motor skill, proprioception of core, proximal hip and LE for self care, mobility, lifting, and ambulation      Manual Treatments:    [] (09108) Provided manual therapy to mobilize proximal hip and LS spine soft tissue/joints for the purpose of modulating pain, promoting relaxation,  increasing ROM, reducing/eliminating soft tissue swelling/inflammation/restriction, improving soft tissue extensibility and allowing for proper ROM for normal function with self care, mobility, lifting and ambulation.      Spoke with   regarding the use of Dry Needling     Dry needling manual therapy: consisted on the placement of 6 needles in the following muscles:  B posterior threading (QF, gemelli, obturator, pt supine 2 needles per side), B piriformis (sidelying 1 per side). A 75 mm needle was inserted, piston, rotated, and coned to produce intramuscular mobilization. These techniques were used to restore functional range of motion, reduce muscle spasm and induce healing in the corresponding musculature. (76220)  Clean Technique was utilized today while applying Dry needling treatment. The treatment sites where cleaned with 70% solution of  isopropyl alcohol . The PT washed their hands and utilized treatment gloves along with hand  prior to inserting the needles. All needles where removed and discarded in the appropriate sharps container. MD has given verbal and/or written approval for this treatment. Modalities:  None     Charges:  Timed Code Treatment Minutes: 55   Total Treatment Minutes: 55   Time in: 8:05  Time out: 9:10    [] EVAL (LOW) 56487 (typically 20 minutes face-to-face)  [] EVAL (MOD) 40604 (typically 30 minutes face-to-face)  [] EVAL (HIGH) 55876 (typically 45 minutes face-to-face)  [] RE-EVAL     [x] NM(33432) x 1     [] IONTO  [x] NMR (91910) x 1    [] VASO  [x] Manual (48461) x 1     [x] Other: DN  [] TA x      [] Mech Traction (86745)  [] ES(attended) (00293)      [] ES (un) (21558):     Goals:   Patient stated goal: Running, tennis, every day activities without pain    []? Progressing: []? Met: []? Not Met: []? Adjusted     Therapist goals for Patient:   Short Term Goals: To be achieved in: 2 weeks 6/17/20  1. Independent in HEP and progression per patient tolerance, in order to prevent re-injury. []? Progressing: []? Met: []? Not Met: []? Adjusted   2. Patient will have a decrease in pain to facilitate improvement in movement, function, and ADLs as indicated by Functional Deficits. []? Progressing: []? Met: []? Not Met: []? Adjusted      Long Term Goals: To be achieved in: 6 weeks 7/15/20  1.  Disability index score of 7% or less for the Eric to assist med/piriformis after needling and felt there was still a large trigger point on the R side. Pt noted some glute/piriformis tightness during standing ABD but able to complete exercise. Pt challenged by prone ext over EOB, noted it was more difficult to contract glute. Pt requires PT follow up to address flexibility, strength and functional mobility deficits. Patient education:  6/3 Pt educated on diagnosis, prognosis and PT plan of care. Educated on [address]. Pt questions were addressed and answered. Prognosis: [] Good [] Fair  [] Poor    Patient Requires Follow-up: [x] Yes  [] No    PLAN: See eval  [x] Continue per plan of care [] Alter current plan (see comments)  [] Plan of care initiated [] Hold pending MD visit [] Discharge    Electronically signed by: Angel Flower PT, DPT  Physical Therapist  [id number]  [email or web address]    *If patient does not return for further follow ups after this date. Please consider this as the patients discharge from physical therapy.

## 2020-06-24 ENCOUNTER — HOSPITAL ENCOUNTER (OUTPATIENT)
Dept: PHYSICAL THERAPY | Age: 70
Setting detail: THERAPIES SERIES
Discharge: HOME | End: 2020-06-24
Payer: MEDICARE

## 2020-06-24 PROCEDURE — 97112 NEUROMUSCULAR REEDUCATION: CPT | Performed by: PHYSICAL THERAPIST

## 2020-06-24 PROCEDURE — 20560 NDL INSJ W/O NJX 1 OR 2 MUSC: CPT | Performed by: PHYSICAL THERAPIST

## 2020-06-24 PROCEDURE — 97110 THERAPEUTIC EXERCISES: CPT | Performed by: PHYSICAL THERAPIST

## 2020-06-24 NOTE — FLOWSHEET NOTE
[facility]    Physical Therapy Daily Treatment Note  Date:  2020    Patient Name:  Joanie Cali    :  1950  MRN: [id number]  Restrictions/Precautions:    Medical/Treatment Diagnosis Information:  · Diagnosis: M48.061 (ICD-10-CM) - Lumbar stenosis without neurogenic claudication; M51.36 (ICD-10-CM) - DDD (degenerative disc disease), lumbar; M51.26 (ICD-10-CM) - HNP (herniated nucleus pulposus), lumbar; M54.16 (ICD-10-CM) - Lumbar radiculopathy  · Treatment Diagnosis: R53.1, M54.5; glute weakness, improper posterior chain firing  Insurance/Certification information:  PT Insurance Information: MEDICARE  Physician Information:  Referring Practitioner: Dr. Cralos Gomez  Has the plan of care been signed (Y/N):        []  Yes  [x]  No     Date of Patient follow up with Physician: Dr. Misty Meade       Is this a Progress Report:     []  Yes  [x]  No        If Yes:  Date Range for reporting period:  Beginning  Ending    Progress report will be due (10 Rx or 30 days whichever is less): 29      Recertification will be due (POC Duration  / 90 days whichever is less): 9/3/20         Visit # Insurance Allowable Auth Required   4 MEDICARE []  Yes [x]  No        Functional Scale:   Date assessed:   [facility] =14% deficit  6/3/20    Latex Allergy:  [x]NO      []YES  Preferred Language for Healthcare:   [x]English       []other:    Pain level:  4/10     SUBJECTIVE:  Pt states she continues to feel better. Uses a heating pad in bed and that helps him when getting up in the morning, does not feel as sore. Pt states that he remains a little sore from the DN, but he does feel that the tightness is less. OBJECTIVE:   ROM   Comments   Trunk flexion 90     Trunk extension 30     Trunk R sidebend P!  Finger tips 3 inches from lateral joint line (knee)     Trunk L sidebend Finger tips 2 inches from lateral joint line (knee)     Trunk R rotation Limited by 50%   Trunk L rotation Limited by 50%     HS flexibility SLR 55 deg B                        Strength Left Right Comments   Hip flexion(L2) 4/5 4+/5     Knee extension(L3) 4/5 4+/5     Knee flexion(S1-2) 4+/5 4+/5     Ankle dorsiflexion(L4) 4+/5 4+/5     Toe extension(L5) 4+/5 4+/5     Ankle eversion/plantar flexion(S1) 5/5 5/5     Hip abd  4+/5 glute med  3+/5 glute min 4+/5 glute med  4-/5 glute min        Special tests   Comments   SLR -     Slump test -     Pelvic symmetry        Segmental Spinal mobility Hypomobile, P to A, at lumbar and thoracic spine     Heel walk    Not assessed   Toe walk    Not assessed   Tandem walk    Not assessed              DTRs Left Right Comments   Patellar(L3-L4)         Achilles(S1-S2)                      Joint mobility:               []? Normal               [x]? Hypo              []? Hyper     Palpation: TTP B piriformis     Functional Mobility/Transfers: Indepdent     Posture: WNL     Gait: B out toeing/hip ER        NM control: improper firing pattern of posterior chain with HS firing before glutes    RESTRICTIONS/PRECAUTIONS: Avoid lumbar extension    Exercises/Interventions:   ROM/stretches     Seated figure 4 3x30\"    Standing HS HEP                                  Strengthening     Glute sets     Clamshells 3x10x3\" neutral hip, B    3x10, hips flexed, foam roll between thighs  DC d/t exacerbation of back painHip hike x10, B    Standing ABD  Toes turned in   SLS     Prone hip ext 3x10x3\" EOB Shortened range to avoid trunk compensation/back extension                    Therapeutic Exercise and NMR EXR  [x] (00851) Provided verbal/tactile cueing for activities related to strengthening, flexibility, endurance, ROM  for improvements in proximal hip and core control with self care, mobility, lifting and ambulation.  [] (06369) Provided verbal/tactile cueing for activities related to improving balance, coordination, kinesthetic sense, posture, motor skill, proprioception  to assist of motion, reduce muscle spasm and induce healing in the corresponding musculature. (63020)  Clean Technique was utilized today while applying Dry needling treatment. The treatment sites where cleaned with 70% solution of  isopropyl alcohol . The PT washed their hands and utilized treatment gloves along with hand  prior to inserting the needles. All needles where removed and discarded in the appropriate sharps container. MD has given verbal and/or written approval for this treatment. Modalities:  None     Charges:  Timed Code Treatment Minutes: 55   Total Treatment Minutes: 55   Time in: 8:05  Time out: 9:00    [] EVAL (LOW) 05503 (typically 20 minutes face-to-face)  [] EVAL (MOD) 48757 (typically 30 minutes face-to-face)  [] EVAL (HIGH) 44864 (typically 45 minutes face-to-face)  [] RE-EVAL     [x] PB(61832) x 2     [] IONTO  [x] NMR (65560) x 1    [] VASO  [] Manual (37612) x 1     [x] Other: DN  [] TA x      [] Mech Traction (64363)  [] ES(attended) (84135)      [] ES (un) (68018):     Goals:   Patient stated goal: Running, tennis, every day activities without pain    []? Progressing: []? Met: []? Not Met: []? Adjusted     Therapist goals for Patient:   Short Term Goals: To be achieved in: 2 weeks 6/17/20  1. Independent in HEP and progression per patient tolerance, in order to prevent re-injury. []? Progressing: []? Met: []? Not Met: []? Adjusted   2. Patient will have a decrease in pain to facilitate improvement in movement, function, and ADLs as indicated by Functional Deficits. []? Progressing: []? Met: []? Not Met: []? Adjusted      Long Term Goals: To be achieved in: 6 weeks 7/15/20  1. Disability index score of 7% or less for the [place] to assist with reaching prior level of function. []? Progressing: []? Met: []? Not Met: []? Adjusted  2.  Patient will demonstrate increased AROM to [facility], given stenotic changes to allow for proper joint functioning as indicated by patients Functional Deficits.   []? Progressing: []? Met: []? Not Met: []? Adjusted  3. Patient will demonstrate an increase in B glute strength to 4/5 or greater to allow for proper functional mobility as indicated by patients Functional Deficits. []? Progressing: []? Met: []? Not Met: []? Adjusted  4. Patient will return to stair negotiation and lifting without increased symptoms or restriction. []? Progressing: []? Met: []? Not Met: []? Adjusted  5. Pt will tolerate progressive return to tennis    []? Progressing: []? Met: []? Not Met: []? Adjusted      Overall Progression Towards Functional goals/ Treatment Progress Update:  [] Patient is progressing as expected towards functional goals listed. [] Progression is slowed due to complexities/Impairments listed. [] Progression has been slowed due to co-morbidities. [x] Plan just implemented, too soon to assess goals progression <30days   [] Goals require adjustment due to lack of progress  [] Patient is not progressing as expected and requires additional follow up with physician  [] Other    Prognosis for POC: [x] Good [] Fair  [] Poor      Patient requires continued skilled intervention: [x] Yes  [] No    Treatment/Activity Tolerance:  [x] Patient able to complete treatment  [] Patient limited by fatigue  [] Patient limited by pain     [] Patient limited by other medical complications  [x] Other:  Pt tolerated DN well, minimal LTR noted. Moderate resistance felt against needle with insertion, L > R. Pt noted discomfort with pistoning but was able to tolerate. Palpable trigger points noted by PT prior to treatment, these were reduced following DN. Pt reported relief and noted that figure 4 stretch felt better- more stretch and less pain, visible improvement in figure 4 ROM following needling. Pt tolerated progression from isometric clam to isotonic well, reported increased glute fatigue.  Pt challenged by addition of IR clams, limited range d/t IR deficits; however, pt still able to achieve muscle fatigue. Pt does report some mild glute/posterior thigh irritation/discomfort with hip hiking. Pt requires PT follow up to address flexibility, strength and functional mobility deficits. Patient education:  6/3 Pt educated on diagnosis, prognosis and PT plan of care. Educated on [address]. Pt questions were addressed and answered. Prognosis: [] Good [] Fair  [] Poor    Patient Requires Follow-up: [x] Yes  [] No    PLAN: See eval  [x] Continue per plan of care [] Alter current plan (see comments)  [] Plan of care initiated [] Hold pending MD visit [] Discharge    Electronically signed by: Cammie Arora PT, DPT  Physical Therapist  [id number]  [email or web address]    *If patient does not return for further follow ups after this date. Please consider this as the patients discharge from physical therapy.

## 2020-06-26 RX ORDER — LISINOPRIL 10 MG/1
10 TABLET ORAL DAILY
Qty: 90 TABLET | Refills: 0 | Status: SHIPPED | OUTPATIENT
Start: 2020-06-26 | End: 2020-11-12

## 2020-06-30 ENCOUNTER — HOSPITAL ENCOUNTER (OUTPATIENT)
Dept: PHYSICAL THERAPY | Age: 70
Setting detail: THERAPIES SERIES
Discharge: HOME | End: 2020-06-30
Payer: MEDICARE

## 2020-06-30 PROCEDURE — 97112 NEUROMUSCULAR REEDUCATION: CPT | Performed by: PHYSICAL THERAPIST

## 2020-06-30 PROCEDURE — 20560 NDL INSJ W/O NJX 1 OR 2 MUSC: CPT | Performed by: PHYSICAL THERAPIST

## 2020-06-30 PROCEDURE — 97140 MANUAL THERAPY 1/> REGIONS: CPT | Performed by: PHYSICAL THERAPIST

## 2020-06-30 PROCEDURE — 97110 THERAPEUTIC EXERCISES: CPT | Performed by: PHYSICAL THERAPIST

## 2020-06-30 NOTE — FLOWSHEET NOTE
[facility]    Physical Therapy Daily Treatment Note  Date:  2020    Patient Name:  Eric Bone    :  1950  MRN: [id number]  Restrictions/Precautions:    Medical/Treatment Diagnosis Information:  · Diagnosis: M48.061 (ICD-10-CM) - Lumbar stenosis without neurogenic claudication; M51.36 (ICD-10-CM) - DDD (degenerative disc disease), lumbar; M51.26 (ICD-10-CM) - HNP (herniated nucleus pulposus), lumbar; M54.16 (ICD-10-CM) - Lumbar radiculopathy  · Treatment Diagnosis: R53.1, M54.5; glute weakness, improper posterior chain firing  Insurance/Certification information:  PT Insurance Information: MEDICARE  Physician Information:  Referring Practitioner: Dr. Crespo Files  Has the plan of care been signed (Y/N):        []  Yes  [x]  No     Date of Patient follow up with Physician: Dr. Juliana Braden       Is this a Progress Report:     []  Yes  [x]  No        If Yes:  Date Range for reporting period:  Beginning  Ending    Progress report will be due (10 Rx or 30 days whichever is less): 00      Recertification will be due (POC Duration  / 90 days whichever is less): 9/3/20         Visit # Insurance Allowable Auth Required   5 MEDICARE []  Yes [x]  No        Functional Scale:   Date assessed:   [facility]=14% deficit  6/3/20    Latex Allergy:  [x]NO      []YES  Preferred Language for Healthcare:   [x]English       []other:    Pain level:  4/10     SUBJECTIVE:  Pt states that he has been feeling good, he has had a little more soreness that goes down the back of the thigh the last few days but thinks it is from over doing it- 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 OBJECTIVE:   ROM   Comments   Trunk flexion 90     Trunk extension 30     Trunk R sidebend P!  Finger tips 3 inches from lateral joint line (knee)     Trunk L sidebend Finger tips 2 inches from lateral joint line (knee)     Trunk R rotation Limited by 50%     Trunk L rotation Limited by 50%     HS flexibility SLR 55 deg B                        Strength Left Right Comments   Hip flexion(L2) 4/5 4+/5     Knee extension(L3) 4/5 4+/5     Knee flexion(S1-2) 4+/5 4+/5     Ankle dorsiflexion(L4) 4+/5 4+/5     Toe extension(L5) 4+/5 4+/5     Ankle eversion/plantar flexion(S1) 5/5 5/5     Hip abd  4+/5 glute med  3+/5 glute min 4+/5 glute med  4-/5 glute min        Special tests   Comments   SLR -     Slump test -     Pelvic symmetry        Segmental Spinal mobility Hypomobile, P to A, at lumbar and thoracic spine     Heel walk    Not assessed   Toe walk    Not assessed   Tandem walk    Not assessed              DTRs Left Right Comments   Patellar(L3-L4)         Achilles(S1-S2)                      Joint mobility:               []? Normal               [x]? Hypo              []? Hyper     Palpation: TTP B piriformis     Functional Mobility/Transfers: Indepdent     Posture: WNL     Gait: B out toeing/hip ER        NM control: improper firing pattern of posterior chain with HS firing before glutes    RESTRICTIONS/PRECAUTIONS: Avoid lumbar extension    Exercises/Interventions:   ROM/stretches     Seated figure 4 3x30\"    Standing HS HEP                                  Strengthening     Glute sets     Clamshells 3x10x3\" neutral hip, B    IR [id number], hips flexed, foam roll between thighs  DC d/t exacerbation of back painHip hike x10, B    Standing ABD  Toes turned in   SLS     Prone hip ext 3x10x3\" EOB Shortened range to avoid trunk compensation/back extension                    Therapeutic Exercise and NMR EXR  [x] (50248) Provided verbal/tactile cueing for activities related to strengthening, flexibility, endurance, ROM  for improvements in proximal hip and core control with self care, mobility, lifting and ambulation.  [] (22488) Provided verbal/tactile cueing for activities related to improving balance, coordination, kinesthetic sense, posture, motor skill, proprioception  to assist with core control in self care, mobility, lifting, and ambulation. Therapeutic Activities:    [] (95222 or 29262) Provided verbal/tactile cueing for activities related to improving balance, coordination, kinesthetic sense, posture, motor skill, proprioception and motor activation to allow for proper function  with self care and ADLs  [] (05330) Provided training and instruction to the patient for proper core and proximal hip recruitment and positioning with ambulation re-education     Home Exercise Program:    [address] [place] [address] access code: [email or web address]   Initiated 6/3/20. Printed hand out given. Pt demonstrated proper form of each exercise and expressed verbal understanding of frequency and duration. Updated 6/10/20. Printed hand out provided. Added prone heel squeeze and hip hike. [x] (48155) Reviewed/Progressed HEP activities related to strengthening, flexibility, endurance, ROM of core, proximal hip and LE for functional self-care, mobility, lifting and ambulation   [] (32437) Reviewed/Progressed HEP activities related to improving balance, coordination, kinesthetic sense, posture, motor skill, proprioception of core, proximal hip and LE for self care, mobility, lifting, and ambulation      Manual Treatments:    Trigger point release, piriformis, B    [x] (55498) Provided manual therapy to mobilize proximal hip and LS spine soft tissue/joints for the purpose of modulating pain, promoting relaxation,  increasing ROM, reducing/eliminating soft tissue swelling/inflammation/restriction, improving soft tissue extensibility and allowing for proper ROM for normal function with self care, mobility, lifting and ambulation. Spoke with   regarding the use of Dry Needling     Dry needling manual therapy: consisted on the placement of 7 needles in the following muscles:  B piriformis (4 R, 3L).   A 75 mm needle (prone, direct technique) and a 40mm (prone, medial trigger point technique) was inserted, piston, rotated, and coned to produce intramuscular mobilization. These techniques were used to restore functional range of motion, reduce muscle spasm and induce healing in the corresponding musculature. (49254)  Clean Technique was utilized today while applying Dry needling treatment. The treatment sites where cleaned with 70% solution of  isopropyl alcohol . The PT washed their hands and utilized treatment gloves along with hand  prior to inserting the needles. All needles where removed and discarded in the appropriate sharps container. MD has given verbal and/or written approval for this treatment. Modalities:  None     Charges:  Timed Code Treatment Minutes: 55   Total Treatment Minutes: 55   Time in: 7:05  Time out: 8:05    [] EVAL (LOW) 42296 (typically 20 minutes face-to-face)  [] EVAL (MOD) 99729 (typically 30 minutes face-to-face)  [] EVAL (HIGH) 95775 (typically 45 minutes face-to-face)  [] RE-EVAL     [x] AR(92379) x 1     [] IONTO  [x] NMR (57771) x 1    [] VASO  [x] Manual (68641) x 1     [x] Other: DN  [] TA x      [] Mech Traction (21728)  [] ES(attended) (81000)      [] ES (un) (37677):     Goals:   Patient stated goal: Running, tennis, every day activities without pain    []? Progressing: []? Met: []? Not Met: []? Adjusted     Therapist goals for Patient:   Short Term Goals: To be achieved in: 2 weeks 6/17/20  1. Independent in HEP and progression per patient tolerance, in order to prevent re-injury. []? Progressing: []? Met: []? Not Met: []? Adjusted   2. Patient will have a decrease in pain to facilitate improvement in movement, function, and ADLs as indicated by Functional Deficits. []? Progressing: []? Met: []? Not Met: []? Adjusted      Long Term Goals: To be achieved in: 6 weeks 7/15/20  1.  Disability index score of 7% or less for the [place] to assist with reaching prior level of

## 2020-07-07 ENCOUNTER — PATIENT MESSAGE (OUTPATIENT)
Dept: ORTHOPEDIC SURGERY | Age: 70
End: 2020-07-07

## 2020-07-07 RX ORDER — GABAPENTIN 300 MG/1
300 CAPSULE ORAL 3 TIMES DAILY
Qty: 90 CAPSULE | Refills: 0 | Status: SHIPPED | OUTPATIENT
Start: 2020-07-07 | End: 2021-03-21

## 2020-07-07 NOTE — TELEPHONE ENCOUNTER
From: Elmira Alva  To: Inocencio Dewey PA-C  Sent: 7/7/2020 8:17 AM EDT  Subject: Non-Urgent Medical Question    Juan Pablo Joseph,    Have been doing PT and seeing some results. I would like to try adding gabapentin. Your thoughts?     Thank you  Denise Taylor  1950

## 2020-07-20 RX ORDER — ATORVASTATIN CALCIUM 20 MG/1
20 TABLET, FILM COATED ORAL DAILY
Qty: 90 TABLET | Refills: 0 | Status: SHIPPED | OUTPATIENT
Start: 2020-07-20 | End: 2020-10-20

## 2020-07-27 ENCOUNTER — HOSPITAL ENCOUNTER (OUTPATIENT)
Dept: PHYSICAL THERAPY | Age: 70
Setting detail: THERAPIES SERIES
Discharge: HOME | End: 2020-07-27
Payer: MEDICARE

## 2020-07-27 PROCEDURE — 97112 NEUROMUSCULAR REEDUCATION: CPT | Performed by: PHYSICAL THERAPIST

## 2020-07-27 PROCEDURE — 20561 NDL INSJ W/O NJX 3+ MUSC: CPT | Performed by: PHYSICAL THERAPIST

## 2020-07-27 PROCEDURE — 97110 THERAPEUTIC EXERCISES: CPT | Performed by: PHYSICAL THERAPIST

## 2020-07-27 NOTE — PLAN OF CARE
[facility]    Physical Therapy Daily Treatment Note  Date:  2020    Patient Name:  Eric Bone    :  1950  MRN: [id number]  Restrictions/Precautions:    Medical/Treatment Diagnosis Information:  · Diagnosis: M48.061 (ICD-10-CM) - Lumbar stenosis without neurogenic claudication; M51.36 (ICD-10-CM) - DDD (degenerative disc disease), lumbar; M51.26 (ICD-10-CM) - HNP (herniated nucleus pulposus), lumbar; M54.16 (ICD-10-CM) - Lumbar radiculopathy  · Treatment Diagnosis: R53.1, M54.5; glute weakness, improper posterior chain firing  Insurance/Certification information:  PT Insurance Information: MEDICARE  Physician Information:  Referring Practitioner: Dr. Juliana Braden  Has the plan of care been signed (Y/N):        []  Yes  [x]  No     Date of Patient follow up with Physician: as needed      Is this a Progress Report:     [x]  Yes  []  No   Overall pt demonstrates improved function compared to IE, he demonstrates improved tolerance to figure 4 stretching and is able achieve greater ROM, generalized muscle tightness of LE persists. Pt continues to exhibit glute weakness but improving tolerance to strength program is a positive sign. Pt is able to complete ADLs without pain and has been able to resume running; he does continue to get pain with more arduous tasks or prolonged positioning, such as with fishing. Pt would benefit from additional PT to address remaining flexibility, strength and functional mobility deficits.      If Yes:  Date Range for reporting period:  Beginning 6/3/20  Ending 20    Progress report will be due (10 Rx or 30 days whichever is less):       Recertification will be due (POC Duration  / 90 days whichever is less): 20         Visit # Insurance Allowable Auth Required   6 MEDICARE []  Yes [x]  No        Functional Scale:   Date assessed:   Eric =14% deficit  6/3/20  Eric =5% deficit  7/27/20    Latex Allergy:  [x]NO      []YES  Preferred Language for Healthcare:   [x]English       []other:    Pain level:  4/10     SUBJECTIVE:  Pt states that 2 weeks ago he felt the best he has felt in 6 months, he was in [facility] and drove both ways, felt fine, started to feel some discomfort in his glutes by hours 11-12. Pt states he walked 5 miles on the beach every day, he was even able to run. When he got back from [facility] he drove to [place] and went fishing, didn't walk or do exercises and did a lot of sitting and standing on the boat so the R side of his back and glute were more bothersome. Pt states that since being back from that trip he has been able to resume walking and his stretches and he seems to be improving. Pt followed up with MD about gabapentin but did not realize it was such an extensive regiment and since he didn't know what his schedule would be in [place] and d/t drowsiness did not feel comfortable taking it at that time, also felt he didn't need it. It thinking of starting it at the first of the month. OBJECTIVE:   ROM   Comments   Trunk flexion 90     Trunk extension 30     Trunk R sidebend P!  Finger tips 3 inches from lateral joint line (knee)     Trunk L sidebend Finger tips 2 inches from lateral joint line (knee)     Trunk R rotation Limited by 50%     Trunk L rotation Limited by 50%     HS flexibility SLR 55 deg B                        Strength Left Right Comments   Hip flexion(L2) 4/5 4+/5     Knee extension(L3) 4/5 4+/5     Knee flexion(S1-2) 4+/5 4+/5     Ankle dorsiflexion(L4) 4+/5 4+/5     Toe extension(L5) 4+/5 4+/5     Ankle eversion/plantar flexion(S1) 5/5 5/5     Hip abd  4+/5 glute med  3+/5 glute min 4+/5 glute med  4-/5 glute min        Special tests   Comments   SLR -     Slump test -     Pelvic symmetry        Segmental Spinal mobility Hypomobile, P to A, at lumbar and thoracic spine     Heel walk    Not assessed   Toe walk    Not assessed   Tandem walk    Not assessed              DTRs Left Right Comments   Patellar(L3-L4)         Achilles(S1-S2)                      Joint mobility:               []? Normal               [x]? Hypo              []? Hyper     Palpation: TTP B piriformis     Functional Mobility/Transfers: Indepdent     Posture: WNL     Gait: B out toeing/hip ER        NM control: improper firing pattern of posterior chain with HS firing before glutes    RESTRICTIONS/PRECAUTIONS: Avoid lumbar extension    Exercises/Interventions:   ROM/stretches     Seated figure 4 3x30\"    Standing HS HEP                                  Strengthening     Glute sets     Clamshells 3x10 flexed hip, B, red loop    IR [id number], B, hips flexed, foam roll between thighs  DC d/t exacerbation of back painHip hike     Standing ABD  Toes turned in   SLS     Prone hip ext  Shortened range to avoid trunk compensation/back extension   Single leg bridge 3x5 B    Side stepping 3 passes, red loop           Therapeutic Exercise and NMR EXR  [x] (95738) Provided verbal/tactile cueing for activities related to strengthening, flexibility, endurance, ROM  for improvements in proximal hip and core control with self care, mobility, lifting and ambulation.  [] (42687) Provided verbal/tactile cueing for activities related to improving balance, coordination, kinesthetic sense, posture, motor skill, proprioception  to assist with core control in self care, mobility, lifting, and ambulation. Therapeutic Activities:    [] (99953 or 35000) Provided verbal/tactile cueing for activities related to improving balance, coordination, kinesthetic sense, posture, motor skill, proprioception and motor activation to allow for proper function  with self care and ADLs  [] (25607) Provided training and instruction to the patient for proper core and proximal hip recruitment and positioning with ambulation re-education     Home Exercise Program:    [address] [place] [address] access code: [email or web address]   Initiated 6/3/20. Printed hand out given. Pt demonstrated proper form of each exercise and expressed verbal understanding of frequency and duration. Updated 6/10/20. Printed hand out provided. Added prone heel squeeze and hip hike. [x] (28662) Reviewed/Progressed HEP activities related to strengthening, flexibility, endurance, ROM of core, proximal hip and LE for functional self-care, mobility, lifting and ambulation   [] (73964) Reviewed/Progressed HEP activities related to improving balance, coordination, kinesthetic sense, posture, motor skill, proprioception of core, proximal hip and LE for self care, mobility, lifting, and ambulation      Manual Treatments:    Trigger point release, piriformis, B    [x] (19389) Provided manual therapy to mobilize proximal hip and LS spine soft tissue/joints for the purpose of modulating pain, promoting relaxation,  increasing ROM, reducing/eliminating soft tissue swelling/inflammation/restriction, improving soft tissue extensibility and allowing for proper ROM for normal function with self care, mobility, lifting and ambulation. Spoke with   regarding the use of Dry Needling     Dry needling manual therapy: consisted on the placement of 6 needles in the following muscles:  B piriformis (3 R, 2L), R QL (1). A 60 mm needle (prone, direct technique) and a 40mm (prone, medial trigger point technique) was inserted, piston, rotated, and coned to produce intramuscular mobilization. These techniques were used to restore functional range of motion, reduce muscle spasm and induce healing in the corresponding musculature. (10910)  Clean Technique was utilized today while applying Dry needling treatment. The treatment sites where cleaned with 70% solution of  isopropyl alcohol . The PT washed their hands and utilized treatment gloves along with hand  prior to inserting the needles. All needles where removed and discarded in the appropriate sharps container.   MD has given verbal and/or written approval for this treatment. Modalities:  None     Charges:  Timed Code Treatment Minutes: 60   Total Treatment Minutes: 60   Time in: 7:53  Time out: 8:53    [] EVAL (LOW) 07235 (typically 20 minutes face-to-face)  [] EVAL (MOD) 15183 (typically 30 minutes face-to-face)  [] EVAL (HIGH) 60739 (typically 45 minutes face-to-face)  [] RE-EVAL     [x] FV(21431) x 2     [] IONTO  [x] NMR (17620) x 1    [] VASO  [] Manual (19960) x      [x] Other: DN  [] TA x      [] Mech Traction (47725)  [] ES(attended) (24468)      [] ES (un) (65540):     Goals:   Patient stated goal: Running, tennis, every day activities without pain    []? Progressing: []? Met: []? Not Met: []? Adjusted     Therapist goals for Patient:   Short Term Goals: To be achieved in: 2 weeks 6/17/20  1. Independent in HEP and progression per patient tolerance, in order to prevent re-injury. []? Progressing: [x]? Met: []? Not Met: []? Adjusted   2. Patient will have a decrease in pain to facilitate improvement in movement, function, and ADLs as indicated by Functional Deficits. []? Progressing: [x]? Met: []? Not Met: []? Adjusted      Long Term Goals: To be achieved in: 6 weeks 7/15/20; + 4 weeks from updated POC 8/27/20  1. Disability index score of 7% or less for the [place] to assist with reaching prior level of function. []? Progressing: [x]? Met: []? Not Met: []? Adjusted  2. Patient will demonstrate increased AROM to [facility], given stenotic changes to allow for proper joint functioning as indicated by patients Functional Deficits.   [x]? Progressing: []? Met: []? Not Met: []? Adjusted  3. Patient will demonstrate an increase in B glute strength to 4/5 or greater to allow for proper functional mobility as indicated by patients Functional Deficits. [x]? Progressing: []? Met: []? Not Met: []? Adjusted  4. Patient will return to stair negotiation and lifting without increased symptoms or restriction. [x]? Progressing: []? Met: []? Not Met: []? Adjusted  5. Pt will tolerate progressive return to tennis    [x]? Progressing: []? Met: []? Not Met: []? Adjusted      Overall Progression Towards Functional goals/ Treatment Progress Update:  [x] Patient is progressing as expected towards functional goals listed. [] Progression is slowed due to complexities/Impairments listed. [] Progression has been slowed due to co-morbidities. [] Plan just implemented, too soon to assess goals progression <30days   [] Goals require adjustment due to lack of progress  [] Patient is not progressing as expected and requires additional follow up with physician  [] Other    Prognosis for POC: [x] Good [] Fair  [] Poor      Patient requires continued skilled intervention: [x] Yes  [] No    Treatment/Activity Tolerance:  [x] Patient able to complete treatment  [] Patient limited by fatigue  [] Patient limited by pain     [] Patient limited by other medical complications  [x] Other:  Good tolerance to DN this date. Pt exhibits decreased trigger points and tautness to palpation at B glutes, continues to report TTP at R glute med/piriformis and L piriformis. Several LTR noted throughout R glutes with visible contraction of muscle. Pt reported 1 LTR on L, but not visible. PT able to move needle more easily through L musculature compared to R, increased resistance on R. D/t c/o of R LBP needled QL this date, pt tolerated well, mild LTR noted. Pt tolerated progression of strength program well, able to reintroduce use of resistance band for increased difficulty which in the past has exacerbated pt's symptoms, able to complete exercises with only reports of muscle fatigue. Pt required VCs on SL bridge to keep pelvis level and VCs on side stepping to maintain hip width distance. Overall pt demonstrates improved function compared to IE, he demonstrates improved tolerance to figure 4 stretching and is able achieve greater ROM, generalized muscle tightness of LE persists.  Pt continues to exhibit glute weakness but improving tolerance to strength program is a positive sign. Pt is able to complete ADLs without pain and has been able to resume running; he does continue to get pain with more arduous tasks or prolonged positioning, such as with fishing. Pt would benefit from additional PT to address remaining flexibility, strength and functional mobility deficits. Patient education:  6/3 Pt educated on diagnosis, prognosis and PT plan of care. Educated on [address]. Pt questions were addressed and answered. Prognosis: [] Good [] Fair  [] Poor    Patient Requires Follow-up: [x] Yes  [] No    PLAN: See eval  [x] Continue per plan of care [] Alter current plan (see comments)  [] Plan of care initiated [] Hold pending MD visit [] Discharge    Electronically signed by: Wilfredo Peralta PT, DPT  Physical Therapist  [id number]  [email or web address]    *If patient does not return for further follow ups after this date. Please consider this as the patients discharge from physical therapy.

## 2020-08-04 ENCOUNTER — HOSPITAL ENCOUNTER (OUTPATIENT)
Dept: PHYSICAL THERAPY | Age: 70
Setting detail: THERAPIES SERIES
Discharge: HOME | End: 2020-08-04
Payer: MEDICARE

## 2020-08-04 PROCEDURE — 97112 NEUROMUSCULAR REEDUCATION: CPT | Performed by: PHYSICAL THERAPIST

## 2020-08-04 PROCEDURE — 20561 NDL INSJ W/O NJX 3+ MUSC: CPT | Performed by: PHYSICAL THERAPIST

## 2020-08-04 PROCEDURE — 97110 THERAPEUTIC EXERCISES: CPT | Performed by: PHYSICAL THERAPIST

## 2020-08-04 NOTE — FLOWSHEET NOTE
[facility]    Physical Therapy Daily Treatment Note  Date:  2020    Patient Name:  Linda Campa    :  1950  MRN: [id number]  Restrictions/Precautions:    Medical/Treatment Diagnosis Information:  · Diagnosis: M48.061 (ICD-10-CM) - Lumbar stenosis without neurogenic claudication; M51.36 (ICD-10-CM) - DDD (degenerative disc disease), lumbar; M51.26 (ICD-10-CM) - HNP (herniated nucleus pulposus), lumbar; M54.16 (ICD-10-CM) - Lumbar radiculopathy  · Treatment Diagnosis: R53.1, M54.5; glute weakness, improper posterior chain firing  Insurance/Certification information:  PT Insurance Information: MEDICARE  Physician Information:  Referring Practitioner: Dr. Valerio Berry  Has the plan of care been signed (Y/N):        []  Yes  [x]  No     Date of Patient follow up with Physician: as needed      Is this a Progress Report:     []  Yes  [x]  No     If Yes:  Date Range for reporting period:  Beginning   Ending     Progress report will be due (10 Rx or 30 days whichever is less): /      Recertification will be due (POC Duration  / 90 days whichever is less): 20         Visit # Insurance Allowable Auth Required   7 MEDICARE []  Yes [x]  No        Functional Scale:   Date assessed:   [place] 14/100=14% deficit  6/3/20  [place] 5/100=5% deficit  20    Latex Allergy:  [x]NO      []YES  Preferred Language for Healthcare:   [x]English       []other:    Pain level:  4/10     SUBJECTIVE:  Pt states he was sore for about 2-3 days following LPV but attributes it to the back pain he had upon arrival, not anything that was done in PT. HEP changes are going well. Pt notes tiredness and tightness in the back today on R side. Pt states that L glute rarely bothers him and he feels stronger on the L side during his exercises. OBJECTIVE:   ROM   Comments   Trunk flexion 90     Trunk extension 30     Trunk R sidebend P!  Finger tips 3 inches from lateral hip and core control with self care, mobility, lifting and ambulation.  [] (12480) Provided verbal/tactile cueing for activities related to improving balance, coordination, kinesthetic sense, posture, motor skill, proprioception  to assist with core control in self care, mobility, lifting, and ambulation. Therapeutic Activities:    [] (59198 or 43427) Provided verbal/tactile cueing for activities related to improving balance, coordination, kinesthetic sense, posture, motor skill, proprioception and motor activation to allow for proper function  with self care and ADLs  [] (73786) Provided training and instruction to the patient for proper core and proximal hip recruitment and positioning with ambulation re-education     Home Exercise Program:    [address] [place] [address] access code: [email or web address]   Initiated 6/3/20. Printed hand out given. Pt demonstrated proper form of each exercise and expressed verbal understanding of frequency and duration. Updated 6/10/20. Printed hand out provided. Added prone heel squeeze and hip hike. [x] (60895) Reviewed/Progressed HEP activities related to strengthening, flexibility, endurance, ROM of core, proximal hip and LE for functional self-care, mobility, lifting and ambulation   [] (78048) Reviewed/Progressed HEP activities related to improving balance, coordination, kinesthetic sense, posture, motor skill, proprioception of core, proximal hip and LE for self care, mobility, lifting, and ambulation      Manual Treatments:    Trigger point release, piriformis, B    [x] (67294) Provided manual therapy to mobilize proximal hip and LS spine soft tissue/joints for the purpose of modulating pain, promoting relaxation,  increasing ROM, reducing/eliminating soft tissue swelling/inflammation/restriction, improving soft tissue extensibility and allowing for proper ROM for normal function with self care, mobility, lifting and ambulation.      Spoke with   regarding the use of Dry Needling     Dry POC 8/27/20  1. Disability index score of 7% or less for the [place] to assist with reaching prior level of function. []? Progressing: [x]? Met: []? Not Met: []? Adjusted  2. Patient will demonstrate increased AROM to [facility], given stenotic changes to allow for proper joint functioning as indicated by patients Functional Deficits.   [x]? Progressing: []? Met: []? Not Met: []? Adjusted  3. Patient will demonstrate an increase in B glute strength to 4/5 or greater to allow for proper functional mobility as indicated by patients Functional Deficits. [x]? Progressing: []? Met: []? Not Met: []? Adjusted  4. Patient will return to stair negotiation and lifting without increased symptoms or restriction. [x]? Progressing: []? Met: []? Not Met: []? Adjusted  5. Pt will tolerate progressive return to tennis    [x]? Progressing: []? Met: []? Not Met: []? Adjusted      Overall Progression Towards Functional goals/ Treatment Progress Update:  [x] Patient is progressing as expected towards functional goals listed. [] Progression is slowed due to complexities/Impairments listed. [] Progression has been slowed due to co-morbidities. [] Plan just implemented, too soon to assess goals progression <30days   [] Goals require adjustment due to lack of progress  [] Patient is not progressing as expected and requires additional follow up with physician  [] Other    Prognosis for POC: [x] Good [] Fair  [] Poor      Patient requires continued skilled intervention: [x] Yes  [] No    Treatment/Activity Tolerance:  [x] Patient able to complete treatment  [] Patient limited by fatigue  [] Patient limited by pain     [] Patient limited by other medical complications  [x] Other:  Pt points to superior aspect of glute med as most painful area on R, +TTP and palpable trigger point; however, PT did not elicit any LTR with glute med needling and pt reported an increase in discomfort following needling.  Several LTR noted with R and L piriformis needling. Good tolerance to QL needling, no LTR felt by PT but pt reported some muscle twitches and PT able to move needle through tissue more easily compared to LPV. Pt reported increase soreness in the R glute by completion of needling, states that R glute like a big ball and had tightened up entirely. Overall, poorer response to DN this date compared to prior sessions. Pt rolled over tennis ball for soft tissue release following figure 4 stretching and before beginning strength program and pt reported this helped to loosen up glute tension. Pt able to complete exercise program, occasional VC for proper foot position during side stepping. Pt very challenged by SL bridges, only able to raise hips 1-2 inches from table; tolerated knee to chest position well to lock out lumbar spine, increased difficulty as he could not compensate with lumbar spine and also because he could not use arms for stability on the table. Pt would benefit from additional PT to address remaining flexibility, strength and functional mobility deficits. Patient education:  6/3 Pt educated on diagnosis, prognosis and PT plan of care. Educated on [address] [place] [address]. Pt questions were addressed and answered. Prognosis: [] Good [] Fair  [] Poor    Patient Requires Follow-up: [x] Yes  [] No    PLAN: See eval  [x] Continue per plan of care [] Alter current plan (see comments)  [] Plan of care initiated [] Hold pending MD visit [] Discharge    Electronically signed by: Diana Zhang PT, DPT  Physical Therapist  [id number]  [email or web address]    *If patient does not return for further follow ups after this date. Please consider this as the patients discharge from physical therapy.

## 2020-08-11 ENCOUNTER — HOSPITAL ENCOUNTER (OUTPATIENT)
Dept: PHYSICAL THERAPY | Age: 70
Setting detail: THERAPIES SERIES
Discharge: HOME | End: 2020-08-11
Payer: MEDICARE

## 2020-08-11 PROCEDURE — 97112 NEUROMUSCULAR REEDUCATION: CPT | Performed by: PHYSICAL THERAPIST

## 2020-08-11 PROCEDURE — 97110 THERAPEUTIC EXERCISES: CPT | Performed by: PHYSICAL THERAPIST

## 2020-08-11 NOTE — FLOWSHEET NOTE
[facility]    Physical Therapy Daily Treatment Note  Date:  2020    Patient Name:  Lexie Field    :  1950  MRN: [id number]  Restrictions/Precautions:    Medical/Treatment Diagnosis Information:  · Diagnosis: M48.061 (ICD-10-CM) - Lumbar stenosis without neurogenic claudication; M51.36 (ICD-10-CM) - DDD (degenerative disc disease), lumbar; M51.26 (ICD-10-CM) - HNP (herniated nucleus pulposus), lumbar; M54.16 (ICD-10-CM) - Lumbar radiculopathy  · Treatment Diagnosis: R53.1, M54.5; glute weakness, improper posterior chain firing  Insurance/Certification information:  PT Insurance Information: MEDICARE  Physician Information:  Referring Practitioner: Dr. Maru Gonzales  Has the plan of care been signed (Y/N):        []  Yes  [x]  No     Date of Patient follow up with Physician: as needed      Is this a Progress Report:     []  Yes  [x]  No     If Yes:  Date Range for reporting period:  Beginning   Ending     Progress report will be due (10 Rx or 30 days whichever is less):       Recertification will be due (POC Duration  / 90 days whichever is less): 20         Visit # Insurance Allowable Auth Required   8 MEDICARE []  Yes [x]  No        Functional Scale:   Date assessed:   [place] 14/100=14% deficit  6/3/20  [place] 5/100=5% deficit  20    Latex Allergy:  [x]NO      []YES  Preferred Language for Healthcare:   [x]English       []other:    Pain level:  4/10     SUBJECTIVE:  Pt states that his back has been feeling sore. Pt states that his R glute remained very tight for 6 days following LPV, the figure 4 was very painful today, he would do that stretch 3x/day but it didn't help much. Pt states that the tenderness/knots feels higher and broader than normal on the R. His L glute is feeling better. Pt states that yesterday his had pain down both legs, \"it felt like it did back in the beginning. \" Pt states that he has been driving in the car a lot. The car hadn't been bothersome for him, but now after a few hours it feels like it spasms and gets tight. Pt still has not tried gabapentin. OBJECTIVE:   ROM   Comments   Trunk flexion 90     Trunk extension 30     Trunk R sidebend P! Finger tips 3 inches from lateral joint line (knee)     Trunk L sidebend Finger tips 2 inches from lateral joint line (knee)     Trunk R rotation Limited by 50%     Trunk L rotation Limited by 50%     HS flexibility SLR 55 deg B                        Strength Left Right Comments   Hip flexion(L2) 4/5 4+/5     Knee extension(L3) 4/5 4+/5     Knee flexion(S1-2) 4+/5 4+/5     Ankle dorsiflexion(L4) 4+/5 4+/5     Toe extension(L5) 4+/5 4+/5     Ankle eversion/plantar flexion(S1) 5/5 5/5     Hip abd  4+/5 glute med  3+/5 glute min 4+/5 glute med  4-/5 glute min        Special tests   Comments   SLR -     Slump test -     Pelvic symmetry        Segmental Spinal mobility Hypomobile, P to A, at lumbar and thoracic spine     Heel walk    Not assessed   Toe walk    Not assessed   Tandem walk    Not assessed              DTRs Left Right Comments   Patellar(L3-L4)         Achilles(S1-S2)                      Joint mobility:               []? Normal               [x]? Hypo              []? Hyper     Palpation: TTP B piriformis     Functional Mobility/Transfers: Indepdent     Posture: WNL     Gait: B out toeing/hip ER        NM control: improper firing pattern of posterior chain with HS firing before glutes    RESTRICTIONS/PRECAUTIONS: Avoid lumbar extension    Exercises/Interventions:   ROM/stretches     Seated figure 4 3x30\"    Standing HS HEP    Tennis ball STM 3' R glute    Knee to opposite shoulder 3x30\" B                        Strengthening     Glute sets     Clamshells    IR clamshellSidelying knee flexion 3x10 B Maintain leg in neutral alignment with hip, focus on glute activation     DC d/t exacerbation of back painHip hike     Standing ABD  Toes turned in   SLS Prone hip ext  Shortened range to avoid trunk compensation/back extension   Single leg bridge Opposite knee pulled into chest   Side stepping    Foam roll assisted RDL 2x8 B Foam roll held on contralateral foot with contralateral hand to add support to RDL   SL multi position squat with slider 2x5 B SL squat on stance leg. Perform 2 positions: lateral and 45 deg backward               Therapeutic Exercise and NMR EXR  [x] (23565) Provided verbal/tactile cueing for activities related to strengthening, flexibility, endurance, ROM  for improvements in proximal hip and core control with self care, mobility, lifting and ambulation.  [] (63071) Provided verbal/tactile cueing for activities related to improving balance, coordination, kinesthetic sense, posture, motor skill, proprioception  to assist with core control in self care, mobility, lifting, and ambulation. Therapeutic Activities:    [] (93167 or 05885) Provided verbal/tactile cueing for activities related to improving balance, coordination, kinesthetic sense, posture, motor skill, proprioception and motor activation to allow for proper function  with self care and ADLs  [] (05966) Provided training and instruction to the patient for proper core and proximal hip recruitment and positioning with ambulation re-education     Home Exercise Program:    [address] [place] [address] access code: [email or web address]   Initiated 6/3/20. Printed hand out given. Pt demonstrated proper form of each exercise and expressed verbal understanding of frequency and duration. Updated 6/10/20. Printed hand out provided. Added prone heel squeeze and hip hike. Updated 8/11/20. Printed handout provided.   [x] (38597) Reviewed/Progressed HEP activities related to strengthening, flexibility, endurance, ROM of core, proximal hip and LE for functional self-care, mobility, lifting and ambulation   [] (40153) Reviewed/Progressed HEP activities related to improving balance, coordination, kinesthetic sense, posture, stenotic changes to allow for proper joint functioning as indicated by patients Functional Deficits.   [x]? Progressing: []? Met: []? Not Met: []? Adjusted  3. Patient will demonstrate an increase in B glute strength to 4/5 or greater to allow for proper functional mobility as indicated by patients Functional Deficits. [x]? Progressing: []? Met: []? Not Met: []? Adjusted  4. Patient will return to stair negotiation and lifting without increased symptoms or restriction. [x]? Progressing: []? Met: []? Not Met: []? Adjusted  5. Pt will tolerate progressive return to tennis    [x]? Progressing: []? Met: []? Not Met: []? Adjusted      Overall Progression Towards Functional goals/ Treatment Progress Update:  [x] Patient is progressing as expected towards functional goals listed. [] Progression is slowed due to complexities/Impairments listed. [] Progression has been slowed due to co-morbidities. [] Plan just implemented, too soon to assess goals progression <30days   [] Goals require adjustment due to lack of progress  [] Patient is not progressing as expected and requires additional follow up with physician  [] Other    Prognosis for POC: [x] Good [] Fair  [] Poor      Patient requires continued skilled intervention: [x] Yes  [] No    Treatment/Activity Tolerance:  [x] Patient able to complete treatment  [] Patient limited by fatigue  [] Patient limited by pain     [] Patient limited by other medical complications  [x] Other: Deferred DN this date d/t response following LPV and several days worth of increased pain in R glute. Pt reported pain with knee to opposite shoulder stretch on R, able to perform on shortened range and feel stretch without pain, advised pt to avoid painful ROM at any point in this stretch, pt able to complete on L side without pain.  Pt noted pain with figure 4 on R, modified position to have R foot below L knee and pt was able to work back into normal seated figure 4 position and pt reported that it \"felt better than it has been. \" Pt tolerated addition of sidelying hip flexion well, did required VCs to maintain neutral hip and knee position while knee flexed. Pt tolerated addition of slider lunges well, required VCs to slide leg in 45 deg direction rather than just into extension; pt noted that extension movement aggravated back but when cued for proper form he reported resolution of back pain and noted glute fatigue. Educated pt that if back symptoms appear to increase pt should DC diagonal direction and focus only on lateral. Pt would benefit from additional PT to address remaining flexibility, strength and functional mobility deficits. Patient education:  6/3 Pt educated on diagnosis, prognosis and PT plan of care. Educated on [address] [place] [address]. Pt questions were addressed and answered. Prognosis: [] Good [] Fair  [] Poor    Patient Requires Follow-up: [x] Yes  [] No    PLAN: See eval  [x] Continue per plan of care [] Alter current plan (see comments)  [] Plan of care initiated [] Hold pending MD visit [] Discharge    Electronically signed by: Gianna Duffy PT, DPT  Physical Therapist  [id number]  [email or web address]    *If patient does not return for further follow ups after this date. Please consider this as the patients discharge from physical therapy.

## 2020-08-20 ENCOUNTER — HOSPITAL ENCOUNTER (OUTPATIENT)
Dept: PHYSICAL THERAPY | Age: 70
Setting detail: THERAPIES SERIES
Discharge: HOME | End: 2020-08-20
Payer: MEDICARE

## 2020-08-20 PROCEDURE — 97112 NEUROMUSCULAR REEDUCATION: CPT | Performed by: PHYSICAL THERAPIST

## 2020-08-20 PROCEDURE — 97110 THERAPEUTIC EXERCISES: CPT | Performed by: PHYSICAL THERAPIST

## 2020-08-20 NOTE — FLOWSHEET NOTE
[facility]    Physical Therapy Daily Treatment Note  Date:  2020    Patient Name:  Dino Cui    :  1950  MRN: [id number]  Restrictions/Precautions:    Medical/Treatment Diagnosis Information:  · Diagnosis: M48.061 (ICD-10-CM) - Lumbar stenosis without neurogenic claudication; M51.36 (ICD-10-CM) - DDD (degenerative disc disease), lumbar; M51.26 (ICD-10-CM) - HNP (herniated nucleus pulposus), lumbar; M54.16 (ICD-10-CM) - Lumbar radiculopathy  · Treatment Diagnosis: R53.1, M54.5; glute weakness, improper posterior chain firing  Insurance/Certification information:  PT Insurance Information: MEDICARE  Physician Information:  Referring Practitioner: Dr. Richter Other  Has the plan of care been signed (Y/N):        []  Yes  [x]  No     Date of Patient follow up with Physician: as needed      Is this a Progress Report:     []  Yes  [x]  No     If Yes:  Date Range for reporting period:  Beginning   Ending     Progress report will be due (10 Rx or 30 days whichever is less):       Recertification will be due (POC Duration  / 90 days whichever is less): 20         Visit # Insurance Allowable Auth Required   8 MEDICARE []  Yes [x]  No        Functional Scale:   Date assessed:   [place] 14/100=14% deficit  6/3/20  [place] 5/100=5% deficit  20    Latex Allergy:  [x]NO      []YES  Preferred Language for Healthcare:   [x]English       []other:    Pain level:  4/10     SUBJECTIVE:  Pt states he felt better after LPV. His glute tightness has been feeling better. \"I think I'm feeling more of the stenosis, pain is down both legs\" pain is posterior thigh only. Pt states he is going to get a second opinion on his back and is considering surgery, appt is not scheduled yet. Pt started gabapentin the day after LPV, pt states it makes him tired but hasn't noticed any change in his back.  Pt states he was up and down on a ladder a lot over the weekend so that may have flared things. OBJECTIVE:   ROM   Comments   Trunk flexion 90     Trunk extension 30     Trunk R sidebend P! Finger tips 3 inches from lateral joint line (knee)     Trunk L sidebend Finger tips 2 inches from lateral joint line (knee)     Trunk R rotation Limited by 50%     Trunk L rotation Limited by 50%     HS flexibility SLR 55 deg B                        Strength Left Right Comments   Hip flexion(L2) 4/5 4+/5     Knee extension(L3) 4/5 4+/5     Knee flexion(S1-2) 4+/5 4+/5     Ankle dorsiflexion(L4) 4+/5 4+/5     Toe extension(L5) 4+/5 4+/5     Ankle eversion/plantar flexion(S1) 5/5 5/5     Hip abd  4+/5 glute med  3+/5 glute min 4+/5 glute med  4-/5 glute min        Special tests   Comments   SLR -     Slump test -     Pelvic symmetry        Segmental Spinal mobility Hypomobile, P to A, at lumbar and thoracic spine     Heel walk    Not assessed   Toe walk    Not assessed   Tandem walk    Not assessed              DTRs Left Right Comments   Patellar(L3-L4)         Achilles(S1-S2)                      Joint mobility:               []? Normal               [x]? Hypo              []? Hyper     Palpation: TTP B piriformis     Functional Mobility/Transfers: Indepdent     Posture: WNL     Gait: B out toeing/hip ER        NM control: improper firing pattern of posterior chain with HS firing before glutes    RESTRICTIONS/PRECAUTIONS: Avoid lumbar extension    Exercises/Interventions:   ROM/stretches     Seated figure 4 3x30\"    Standing HS HEP    Tennis ball STM 3' R glute    Knee to opposite shoulder 3x30\" B                        Strengthening     Glute sets     Clamshells    IR clamshellSidelying knee flexion 3x10 B Maintain leg in neutral alignment with hip, focus on glute activation   Sidelying ABD circles x10 CW/CCW R only   DC d/t exacerbation of back painHip hike     Standing ABD  Toes turned in   SLS     Prone hip ext  Shortened range to avoid trunk compensation/back extension Single leg bridge Opposite knee pulled into chest   Side stepping    Foam roll assisted RDL 2x8 B Foam roll held on contralateral foot with contralateral hand to add support to RDL   Lateral lunge with slider 2x5 B    SL squat 3x8  Against wall with swiss ball   Airplane 5x5\" R only, kneeling on airex     Therapeutic Exercise and NMR EXR  [x] (44559) Provided verbal/tactile cueing for activities related to strengthening, flexibility, endurance, ROM  for improvements in proximal hip and core control with self care, mobility, lifting and ambulation.  [] (22263) Provided verbal/tactile cueing for activities related to improving balance, coordination, kinesthetic sense, posture, motor skill, proprioception  to assist with core control in self care, mobility, lifting, and ambulation. Therapeutic Activities:    [] (95471 or 98205) Provided verbal/tactile cueing for activities related to improving balance, coordination, kinesthetic sense, posture, motor skill, proprioception and motor activation to allow for proper function  with self care and ADLs  [] (40634) Provided training and instruction to the patient for proper core and proximal hip recruitment and positioning with ambulation re-education     Home Exercise Program:    [address] access code: [email or web address]   Initiated 6/3/20. Printed hand out given. Pt demonstrated proper form of each exercise and expressed verbal understanding of frequency and duration. Updated 6/10/20. Printed hand out provided. Added prone heel squeeze and hip hike. Updated 8/11/20. Printed handout provided.   [x] (11393) Reviewed/Progressed HEP activities related to strengthening, flexibility, endurance, ROM of core, proximal hip and LE for functional self-care, mobility, lifting and ambulation   [] (90714) Reviewed/Progressed HEP activities related to improving balance, coordination, kinesthetic sense, posture, motor skill, proprioception of core, proximal hip and LE for self care, mobility, lifting, and ambulation      Manual Treatments:    [x] (86595) Provided manual therapy to mobilize proximal hip and LS spine soft tissue/joints for the purpose of modulating pain, promoting relaxation,  increasing ROM, reducing/eliminating soft tissue swelling/inflammation/restriction, improving soft tissue extensibility and allowing for proper ROM for normal function with self care, mobility, lifting and ambulation. Spoke with   regarding the use of Dry Needling         Modalities:  None     Charges:  Timed Code Treatment Minutes: 58   Total Treatment Minutes: 58   Time in: 8:02  Time out: 9:00    [] EVAL (LOW) 81396 (typically 20 minutes face-to-face)  [] EVAL (MOD) 83668 (typically 30 minutes face-to-face)  [] EVAL (HIGH) 42600 (typically 45 minutes face-to-face)  [] RE-EVAL     [x] LA(51412) x 2     [] IONTO  [x] NMR (17305) x 2    [] VASO  [] Manual (95383) x      [] Other: DN  [] TA x      [] Mech Traction (47687)  [] ES(attended) (88726)      [] ES (un) (57011):     Goals:   Patient stated goal: Running, tennis, every day activities without pain    []? Progressing: []? Met: []? Not Met: []? Adjusted     Therapist goals for Patient:   Short Term Goals: To be achieved in:   1. Independent in HEP and progression per patient tolerance, in order to prevent re-injury. []? Progressing: [x]? Met: []? Not Met: []? Adjusted   2. Patient will have a decrease in pain to facilitate improvement in movement, function, and ADLs as indicated by Functional Deficits. []? Progressing: [x]? Met: []? Not Met: []? Adjusted      Long Term Goals: To be achieved in: 6 weeks 7/15/20; + 4 weeks from updated POC 8/27/20  1. Disability index score of 7% or less for the [place] to assist with reaching prior level of function. []? Progressing: [x]? Met: []? Not Met: []? Adjusted  2.  Patient will demonstrate increased AROM to [facility], given stenotic changes to allow for proper joint functioning as indicated by patients Functional Deficits.   [x]? Progressing: []? Met: []? Not Met: []? Adjusted  3. Patient will demonstrate an increase in B glute strength to 4/5 or greater to allow for proper functional mobility as indicated by patients Functional Deficits. [x]? Progressing: []? Met: []? Not Met: []? Adjusted  4. Patient will return to stair negotiation and lifting without increased symptoms or restriction. [x]? Progressing: []? Met: []? Not Met: []? Adjusted  5. Pt will tolerate progressive return to tennis    [x]? Progressing: []? Met: []? Not Met: []? Adjusted      Overall Progression Towards Functional goals/ Treatment Progress Update:  [x] Patient is progressing as expected towards functional goals listed. [] Progression is slowed due to complexities/Impairments listed. [] Progression has been slowed due to co-morbidities. [] Plan just implemented, too soon to assess goals progression <30days   [] Goals require adjustment due to lack of progress  [] Patient is not progressing as expected and requires additional follow up with physician  [] Other    Prognosis for POC: [x] Good [] Fair  [] Poor      Patient requires continued skilled intervention: [x] Yes  [] No    Treatment/Activity Tolerance:  [x] Patient able to complete treatment  [] Patient limited by fatigue  [] Patient limited by pain     [] Patient limited by other medical complications  [x] Other: Pt tolerated addition of new exercises well, requires VCs to focus on glute engagement to avoid compensation from lumbar spine. Pt reported that he feels glutes working, no exacerbation of lumbar pain. Pt to focus on I HEP for 2 weeks and then follow up for progressions. Pt would benefit from additional PT to address remaining flexibility, strength and functional mobility deficits. Patient education:  6/3 Pt educated on diagnosis, prognosis and PT plan of care. Educated on [employer]. Pt questions were addressed and answered.     Prognosis: [] Good [] Fair  []

## 2020-10-20 RX ORDER — CLOPIDOGREL BISULFATE 75 MG/1
75 TABLET ORAL DAILY
Qty: 90 TABLET | Refills: 0 | Status: SHIPPED | OUTPATIENT
Start: 2020-10-20 | End: 2021-02-02 | Stop reason: SDUPTHER

## 2020-10-20 RX ORDER — ATORVASTATIN CALCIUM 20 MG/1
20 TABLET, FILM COATED ORAL DAILY
Qty: 90 TABLET | Refills: 0 | Status: SHIPPED | OUTPATIENT
Start: 2020-10-20 | End: 2021-02-02 | Stop reason: SDUPTHER

## 2020-10-20 NOTE — TELEPHONE ENCOUNTER
Requested Prescriptions     Pending Prescriptions Disp Refills    atorvastatin (LIPITOR) 20 MG tablet [Pharmacy Med Name: ATORVASTATIN 20MG TABLETS] 90 tablet 0     Sig: TAKE 1 TABLET BY MOUTH DAILY.  FOLLOW UP LABS ARE NEEDED    clopidogrel (PLAVIX) 75 MG tablet [Pharmacy Med Name: CLOPIDOGREL 75MG TABLETS] 90 tablet 0     Sig: TAKE 1 TABLET BY MOUTH DAILY     Adela Barry

## 2020-10-20 NOTE — TELEPHONE ENCOUNTER
Requested Prescriptions     Pending Prescriptions Disp Refills    sertraline (ZOLOFT) 50 MG tablet [Pharmacy Med Name: SERTRALINE 50MG TABLETS] 90 tablet 1     Sig: TAKE 1 TABLET BY MOUTH DAILY     LOV:3/31/2020  Papito Sanders

## 2020-11-12 RX ORDER — LISINOPRIL 10 MG/1
10 TABLET ORAL DAILY
Qty: 90 TABLET | Refills: 0 | Status: SHIPPED | OUTPATIENT
Start: 2020-11-12 | End: 2021-03-18 | Stop reason: SDUPTHER

## 2020-11-23 ENCOUNTER — PATIENT MESSAGE (OUTPATIENT)
Dept: FAMILY MEDICINE CLINIC | Age: 70
End: 2020-11-23

## 2020-11-23 NOTE — TELEPHONE ENCOUNTER
From: Yoselyn Led  To: Ashutosh Jin MD  Sent: 11/23/2020 1:43 PM EST  Subject: Non-Urgent Medical Question    Need a DrMahendra referral for Rapid Covid test 7 days ago I was in contact with two friends who had shortly after tested positive. I have been self quarantining since.      I need a referral sent to  [address] [place] [address]  Fax: [phone]  Phone# [phone]

## 2020-12-17 ENCOUNTER — TELEPHONE (OUTPATIENT)
Dept: FAMILY MEDICINE CLINIC | Age: 70
End: 2020-12-17

## 2020-12-17 DIAGNOSIS — M47.812 CERVICAL SPONDYLOSIS: ICD-10-CM

## 2020-12-17 DIAGNOSIS — I10 ESSENTIAL HYPERTENSION: Primary | ICD-10-CM

## 2020-12-17 DIAGNOSIS — I25.10 CORONARY ARTERY DISEASE INVOLVING NATIVE CORONARY ARTERY OF NATIVE HEART WITHOUT ANGINA PECTORIS: ICD-10-CM

## 2020-12-17 DIAGNOSIS — E11.9 TYPE 2 DIABETES MELLITUS WITHOUT COMPLICATION, WITHOUT LONG-TERM CURRENT USE OF INSULIN (HCC): ICD-10-CM

## 2020-12-17 DIAGNOSIS — E78.00 PURE HYPERCHOLESTEROLEMIA: ICD-10-CM

## 2021-01-16 DIAGNOSIS — I25.10 CORONARY ARTERY DISEASE INVOLVING NATIVE CORONARY ARTERY OF NATIVE HEART WITHOUT ANGINA PECTORIS: ICD-10-CM

## 2021-01-16 DIAGNOSIS — E11.9 TYPE 2 DIABETES MELLITUS WITHOUT COMPLICATION, WITHOUT LONG-TERM CURRENT USE OF INSULIN (HCC): ICD-10-CM

## 2021-01-16 DIAGNOSIS — I10 ESSENTIAL HYPERTENSION: ICD-10-CM

## 2021-01-16 DIAGNOSIS — E78.00 PURE HYPERCHOLESTEROLEMIA: ICD-10-CM

## 2021-01-16 LAB
A/G RATIO: 1.9 (ref 1.1–2.2)
ALBUMIN SERPL-MCNC: 4.8 G/DL (ref 3.4–5)
ALP BLD-CCNC: 83 U/L (ref 40–129)
ALT SERPL-CCNC: 57 U/L (ref 10–40)
ANION GAP SERPL CALCULATED.3IONS-SCNC: 9 MMOL/L (ref 3–16)
AST SERPL-CCNC: 46 U/L (ref 15–37)
BILIRUB SERPL-MCNC: 0.6 MG/DL (ref 0–1)
BUN BLDV-MCNC: 17 MG/DL (ref 7–20)
CALCIUM SERPL-MCNC: 10.4 MG/DL (ref 8.3–10.6)
CHLORIDE BLD-SCNC: 102 MMOL/L (ref 99–110)
CHOLESTEROL, TOTAL: 182 MG/DL (ref 0–199)
CO2: 27 MMOL/L (ref 21–32)
CREAT SERPL-MCNC: 1 MG/DL (ref 0.8–1.3)
GFR AFRICAN AMERICAN: >60
GFR NON-AFRICAN AMERICAN: >60
GLOBULIN: 2.5 G/DL
GLUCOSE BLD-MCNC: 131 MG/DL (ref 70–99)
HDLC SERPL-MCNC: 45 MG/DL (ref 40–60)
LDL CHOLESTEROL CALCULATED: 121 MG/DL
POTASSIUM SERPL-SCNC: 4.9 MMOL/L (ref 3.5–5.1)
SODIUM BLD-SCNC: 138 MMOL/L (ref 136–145)
TOTAL PROTEIN: 7.3 G/DL (ref 6.4–8.2)
TRIGL SERPL-MCNC: 78 MG/DL (ref 0–150)
VLDLC SERPL CALC-MCNC: 16 MG/DL

## 2021-01-17 LAB
ESTIMATED AVERAGE GLUCOSE: 137 MG/DL
HBA1C MFR BLD: 6.4 %

## 2021-01-20 ENCOUNTER — OFFICE VISIT (OUTPATIENT)
Dept: FAMILY MEDICINE CLINIC | Age: 71
End: 2021-01-20
Payer: MEDICARE

## 2021-01-20 VITALS
SYSTOLIC BLOOD PRESSURE: 112 MMHG | OXYGEN SATURATION: 99 % | BODY MASS INDEX: 30.9 KG/M2 | WEIGHT: 208.6 LBS | HEIGHT: 69 IN | RESPIRATION RATE: 12 BRPM | TEMPERATURE: 96.8 F | DIASTOLIC BLOOD PRESSURE: 72 MMHG | HEART RATE: 55 BPM

## 2021-01-20 DIAGNOSIS — F34.1 DYSTHYMIC DISORDER: ICD-10-CM

## 2021-01-20 DIAGNOSIS — M48.02 SPINAL STENOSIS IN CERVICAL REGION: ICD-10-CM

## 2021-01-20 DIAGNOSIS — M17.11 PATELLOFEMORAL ARTHRITIS OF RIGHT KNEE: ICD-10-CM

## 2021-01-20 DIAGNOSIS — I25.10 CORONARY ARTERY DISEASE INVOLVING NATIVE CORONARY ARTERY OF NATIVE HEART WITHOUT ANGINA PECTORIS: ICD-10-CM

## 2021-01-20 DIAGNOSIS — J01.90 ACUTE SINUSITIS, RECURRENCE NOT SPECIFIED, UNSPECIFIED LOCATION: ICD-10-CM

## 2021-01-20 DIAGNOSIS — M51.36 DDD (DEGENERATIVE DISC DISEASE), LUMBAR: ICD-10-CM

## 2021-01-20 DIAGNOSIS — K21.9 GASTROESOPHAGEAL REFLUX DISEASE WITHOUT ESOPHAGITIS: ICD-10-CM

## 2021-01-20 DIAGNOSIS — E78.00 PURE HYPERCHOLESTEROLEMIA: ICD-10-CM

## 2021-01-20 DIAGNOSIS — E34.9 TESTOSTERONE DEFICIENCY: ICD-10-CM

## 2021-01-20 DIAGNOSIS — M65.332 TRIGGER MIDDLE FINGER OF LEFT HAND: ICD-10-CM

## 2021-01-20 DIAGNOSIS — Z00.00 ROUTINE GENERAL MEDICAL EXAMINATION AT A HEALTH CARE FACILITY: Primary | ICD-10-CM

## 2021-01-20 DIAGNOSIS — M48.061 SPINAL STENOSIS OF LUMBAR REGION, UNSPECIFIED WHETHER NEUROGENIC CLAUDICATION PRESENT: ICD-10-CM

## 2021-01-20 DIAGNOSIS — H91.93 HEARING DIFFICULTY OF BOTH EARS: ICD-10-CM

## 2021-01-20 DIAGNOSIS — E11.9 TYPE 2 DIABETES MELLITUS WITHOUT COMPLICATION, WITHOUT LONG-TERM CURRENT USE OF INSULIN (HCC): ICD-10-CM

## 2021-01-20 DIAGNOSIS — M19.072 OSTEOARTHRITIS OF LEFT FOOT, UNSPECIFIED OSTEOARTHRITIS TYPE: ICD-10-CM

## 2021-01-20 DIAGNOSIS — M50.30 DEGENERATION OF CERVICAL INTERVERTEBRAL DISC: ICD-10-CM

## 2021-01-20 LAB
CREATININE URINE POCT: 254.8
MICROALBUMIN/CREAT 24H UR: 32.9 MG/G{CREAT}
MICROALBUMIN/CREAT UR-RTO: 12.9

## 2021-01-20 PROCEDURE — G8484 FLU IMMUNIZE NO ADMIN: HCPCS | Performed by: FAMILY MEDICINE

## 2021-01-20 PROCEDURE — 3017F COLORECTAL CA SCREEN DOC REV: CPT | Performed by: FAMILY MEDICINE

## 2021-01-20 PROCEDURE — G0439 PPPS, SUBSEQ VISIT: HCPCS | Performed by: FAMILY MEDICINE

## 2021-01-20 PROCEDURE — 82044 UR ALBUMIN SEMIQUANTITATIVE: CPT | Performed by: FAMILY MEDICINE

## 2021-01-20 PROCEDURE — 4040F PNEUMOC VAC/ADMIN/RCVD: CPT | Performed by: FAMILY MEDICINE

## 2021-01-20 PROCEDURE — 3044F HG A1C LEVEL LT 7.0%: CPT | Performed by: FAMILY MEDICINE

## 2021-01-20 PROCEDURE — 1123F ACP DISCUSS/DSCN MKR DOCD: CPT | Performed by: FAMILY MEDICINE

## 2021-01-20 RX ORDER — DOXYCYCLINE HYCLATE 100 MG
100 TABLET ORAL 2 TIMES DAILY
Qty: 20 TABLET | Refills: 0 | Status: SHIPPED | OUTPATIENT
Start: 2021-01-20 | End: 2021-01-30

## 2021-01-20 ASSESSMENT — PATIENT HEALTH QUESTIONNAIRE - PHQ9
2. FEELING DOWN, DEPRESSED OR HOPELESS: 0
1. LITTLE INTEREST OR PLEASURE IN DOING THINGS: 0
SUM OF ALL RESPONSES TO PHQ9 QUESTIONS 1 & 2: 0
SUM OF ALL RESPONSES TO PHQ QUESTIONS 1-9: 0

## 2021-01-20 NOTE — PROGRESS NOTES
Medicare Annual Wellness Visit  Name: Luis Gauthier Date: 2021   MRN: [id number] Sex: Male   Age: 79 y.o. Ethnicity: Non-/Non    : 1950 Race: Marylene Bow is here for [facility]    Screenings for behavioral, psychosocial and functional/safety risks, and cognitive dysfunction are all negative except as indicated below. These results, as well as other patient data from the [address] form, are documented in Flowsheets linked to this Encounter. Patient is walking 3-5 miles /day . His low back is flaring. No weakness to legs. Radiation to legs bilateral to knees and rarely to feet. No bowel or bladder incontinence. Brother has memory issues as of the past 8 months, which started in 2020. He is POA for him recently. He has gotten lost while driving. Left 3rd and 4th fingers triggering X 6 months. Nasal congestion and post nasal drip  X 2 months. No fever. Clear discharge. No sore throat . Occasional headache . No h/o allergies. Allergies   Allergen Reactions    Amoxicillin Hives and Swelling       Prior to Visit Medications    Medication Sig Taking? Authorizing Provider   lisinopril (PRINIVIL;ZESTRIL) 10 MG tablet Take 1 tablet by mouth daily FOLLOW UP APPOINTMENT AND LABS ARE NEEDED. Yes Eliceo Miller MD   sertraline (ZOLOFT) 50 MG tablet TAKE 1 TABLET BY MOUTH DAILY Yes Eliceo Miller MD   atorvastatin (LIPITOR) 20 MG tablet TAKE 1 TABLET BY MOUTH DAILY. FOLLOW UP LABS ARE NEEDED Yes Eliceo Miller MD   clopidogrel (PLAVIX) 75 MG tablet TAKE 1 TABLET BY MOUTH DAILY Yes Eliceo Miller MD   Probiotic Product (PROBIOTIC DAILY PO) Take 1 tablet by mouth daily Yes Historical Provider, MD   sildenafil (REVATIO) 20 MG tablet Take 1 - 5 pills po prn ED.  Yes Eliceo Miller MD   aspirin 325 MG tablet Take 81 mg by mouth daily  Yes Historical Provider, MD diet controlled    ED (erectile dysfunction)      Esophageal reflux      Lumbar stenosis 02/2020    L2-3; L3-4; L4-5    Obstructive sleep apnea 2003    CPAP    Prediabetes 4/15    Pure hypercholesterolemia      Retinal tears 1980, 1997    Spinal stenosis in cervical region      Testosterone deficiency 4/12    Tricuspid regurgitation      Ventricular tachycardia, nonsustained        Past Surgical History:   Procedure Laterality Date    APPENDECTOMY  1958    CERVICAL FUSION  12/9/2010    ACDF C7-T1with Bilateral C8 Root Decompression, Zero & Impant w/synthesis Removal of C7 screw on Left   [address]    right    KNEE SURGERY  1994    left    KNEE SURGERY  2001    right    PAIN MANAGEMENT PROCEDURE N/A 3/18/2020    BILATERAL L3 TRANSFORAMINAL  EPIDURAL STEROID INJECTION WITH FLUOROSCOPY performed by Ari White MD at [address] Bilateral 5/13/2020    BILATERAL L4 TRANSFORAMINAL EPIDURAL STEROID INJECTION WITH FLUOROSCOPY performed by Ari White MD at [facility]  08/2016    PTCA  4 26 2011    with stent LAD    TONSILLECTOMY  1955    VASECTOMY  1989       Family History   Problem Relation Age of Onset    Heart Disease Mother     Heart Disease Father         CAD    Diabetes Father     High Blood Pressure Father     Hypertension Father        CareTeam (Including outside providers/suppliers regularly involved in providing care):   Patient Care Team:  Robert Abreu MD as PCP - General (Family Medicine)  Robert Abreu MD as PCP - [address] [place], [place] [address]leah Neffled Provider    Wt Readings from Last 3 Encounters:   01/20/21 208 lb 9.6 oz (94.6 kg)   06/09/20 203 lb 14.8 oz (92.5 kg)   05/28/20 203 lb 14.8 oz (92.5 kg)     Vitals:    01/20/21 0819   BP: 112/72   Pulse: 55   Resp: 12   Temp: 96.8 °F (36 °C)   TempSrc: Temporal   SpO2: 99%   Weight: 208 lb 9.6 oz (94.6 kg)   Height: 5' 9.29\" (1.76 m)     Body mass index is 30.55 kg/m². Based upon direct observation of the patient, evaluation of cognition reveals recent and remote memory intact. General Appearance: alert and oriented to person, place and time, well developed and well- nourished, in no acute distress  Skin: warm and dry, no rash or erythema  Head: normocephalic and atraumatic  Eyes: pupils equal, round, and reactive to light, extraocular eye movements intact, conjunctivae normal  ENT: tympanic membrane, external ear and ear canal normal bilaterally, nose without deformity, nasal mucosa and turbinates normal without polyps  Neck: supple and non-tender without mass, no thyromegaly or thyroid nodules, no cervical lymphadenopathy  Pulmonary/Chest: clear to auscultation bilaterally- no wheezes, rales or rhonchi, normal air movement, no respiratory distress  Cardiovascular: normal rate, regular rhythm, normal S1 and S2, no murmurs, rubs, clicks, or gallops, distal pulses intact, no carotid bruits  Abdomen: soft, non-tender, non-distended, normal bowel sounds, no masses or organomegaly  Extremities: no cyanosis, clubbing or edema  Musculoskeletal: normal range of motion, no joint swelling, deformity or tenderness  Neurologic: reflexes normal and symmetric, no cranial nerve deficit, gait, coordination and speech normal    Patient's complete Health Risk Assessment and screening values have been reviewed and are found in Flowsheets. The following problems were reviewed today and where indicated follow up appointments were made and/or referrals ordered. Positive Risk Factor Screenings with Interventions:           Health Habits/Nutrition:  Health Habits/Nutrition  Do you exercise for at least 20 minutes 2-3 times per week?: Yes  Have you lost any weight without trying in the past 3 months?: No  Do you eat fewer than 2 meals per day?: (!) Yes  Have you seen a dentist within the past year?: Yes  Body mass index: (!) 30.54  Health Habits/Nutrition Interventions:  · discussed fall risks. Hearing/Vision:  No exam data present  Hearing/Vision  Do you or your family notice any trouble with your hearing?: (!) Yes  Do you have difficulty driving, watching TV, or doing any of your daily activities because of your eyesight?: No  Have you had an eye exam within the past year?: Yes  Hearing/Vision Interventions:  · Hearing concerns:  audiology referral provided    Safety:  Safety  Do you have working smoke detectors?: Yes  Have all throw rugs been removed or fastened?: Yes  Do you have non-slip mats or surfaces in all bathtubs/showers?: (!) No  Do all of your stairways have a railing or banister?: Yes  Are your doorways, halls and stairs free of clutter?: Yes  Do you always fasten your seatbelt when you are in a car?: Yes  Safety Interventions:  · Home safety tips provided     Personalized Preventive Plan   Current Health Maintenance Status  Immunization History   Administered Date(s) Administered    Influenza, High Dose (Fluzone 65 yrs and older) 12/04/2018    Pneumococcal Conjugate 13-valent ([id number]) 09/26/2017    Pneumococcal Polysaccharide ([id number]) 12/04/2018    Td, unspecified formulation 09/26/2017    Tdap (Boostrix, Adacel) 07/13/2010    Zoster Live (Zostavax) 07/08/2013        Health Maintenance   Topic Date Due    Diabetic retinal exam  10/07/1960    Shingles Vaccine (2 of 3) 09/02/2013    Annual Wellness Visit (AWV)  05/29/2019    Flu vaccine (1) 09/01/2020    Diabetic foot exam  11/12/2020    Diabetic microalbuminuria test  11/12/2020    PSA counseling  05/30/2021    A1C test (Diabetic or Prediabetic)  01/16/2022    Lipid screen  01/16/2022    Potassium monitoring  01/16/2022    Creatinine monitoring  01/16/2022    Colon cancer screen colonoscopy  05/18/2023    DTaP/Tdap/Td vaccine (3 - Td) 09/26/2027    Pneumococcal 65+ years Vaccine  Completed    Hepatitis C screen  Completed    Hepatitis A vaccine  Aged Out    Hib vaccine  Aged Out  Meningococcal (ACWY) vaccine  Aged Out     Recommendations for Preventive Services Due: see orders and patient instructions/AVS.  . Recommended screening schedule for the next 5-10 years is provided to the patient in written form: see Patient Instructions/AVS.    Vandana Collins was seen today for medicare aw. Diagnoses and all orders for this visit:    1. Routine general medical examination at a health care facility  - Reviewed recent labs. 2. Type 2 diabetes mellitus without complication, without long-term current use of insulin ([facility])  - HgbA1c= 6.4 in 1/21. Controlled. - Continue dietary/ lifestyle modifications, including decreased concentrated sweets and carbohydrates. -  DIABETES FOOT EXAM  - POCT microalbumin    3. Pure hypercholesterolemia  - Controlled. Continue Lipitor 20 mg qd and low cholesterol diet. 4. Coronary artery disease involving native coronary artery of native heart without angina pectoris  - Controlled. Continue Aspirin qd and Lipitor qd .    5. DDD (degenerative disc disease), lumbar/ 6. Spinal stenosis of lumbar region, unspecified whether neurogenic claudication present  - Moist heat . ROM exercises. Modify activities. 7. Gastroesophageal reflux disease without esophagitis  - Stable. Continue dietary/ lifestyle modifications. 8. Dysthymic disorder  - Stable. Continue Zoloft 50 mg qd. 9. Testosterone deficiency  - stable. 10. Spinal stenosis in cervical region/ 11. Degeneration of cervical intervertebral disc  - Moist heat . ROM exercises. Modify activities. 12. Osteoarthritis of left foot, unspecified osteoarthritis type  - stable. 13. Patellofemoral arthritis of right knee  - stable. Moist heat . ROM exercises. Modify activities. 14. Hearing difficulty of both ears  - Referral - Guido Gonzalez, Audiology, [facility]    15.  Trigger middle finger of left hand - Referral -- Darryl Ibarra MD, Hand Surgery (Hand, Wrist, Upper Extremity), [place]    16. Acute sinusitis, recurrence not specified, unspecified location  - Push fluids - water. Add Netti pot and /or Flonase NS qhs.  - doxycycline hyclate (VIBRA-TABS) 100 MG tablet; Take 1 tablet by mouth 2 times daily for 10 days  Dispense: 20 tablet; Refill: 0    F/u if no improvement 7-10d/ prn increased symptoms/ otherwise follow up in in 6 months/ prn.

## 2021-01-20 NOTE — PATIENT INSTRUCTIONS
Personalized Preventive Plan for Farhad Huang - 1/20/2021  Medicare offers a range of preventive health benefits. Some of the tests and screenings are paid in full while other may be subject to a deductible, co-insurance, and/or copay. Some of these benefits include a comprehensive review of your medical history including lifestyle, illnesses that may run in your family, and various assessments and screenings as appropriate. After reviewing your medical record and screening and assessments performed today your provider may have ordered immunizations, labs, imaging, and/or referrals for you. A list of these orders (if applicable) as well as your Preventive Care list are included within your After Visit Summary for your review. Other Preventive Recommendations:    · A preventive eye exam performed by an eye specialist is recommended every 1-2 years to screen for glaucoma; cataracts, macular degeneration, and other eye disorders. · A preventive dental visit is recommended every 6 months. · Try to get at least 150 minutes of exercise per week or 10,000 steps per day on a pedometer . · Order or download the FREE \"Exercise & Physical Activity: Your Everyday Guide\" from [employer]. Call 9-895.794.9074 or search [employer] online. · You need 5729-4543 mg of calcium and 6766-3857 IU of vitamin D per day. It is possible to meet your calcium requirement with diet alone, but a vitamin D supplement is usually necessary to meet this goal.  · When exposed to the sun, use a sunscreen that protects against both UVA and UVB radiation with an SPF of 30 or greater. Reapply every 2 to 3 hours or after sweating, drying off with a towel, or swimming. · Always wear a seat belt when traveling in a car. Always wear a helmet when riding a bicycle or motorcycle.

## 2021-01-21 LAB
BILIRUBIN URINE: NEGATIVE
BLOOD, URINE: NEGATIVE
CLARITY: ABNORMAL
COLOR: YELLOW
EPITHELIAL CELLS, UA: 0 /HPF (ref 0–5)
GLUCOSE URINE: NEGATIVE MG/DL
HYALINE CASTS: 2 /LPF (ref 0–8)
KETONES, URINE: NEGATIVE MG/DL
LEUKOCYTE ESTERASE, URINE: NEGATIVE
MICROSCOPIC EXAMINATION: ABNORMAL
NITRITE, URINE: NEGATIVE
PH UA: 5.5 (ref 5–8)
PROTEIN UA: NEGATIVE MG/DL
RBC UA: 2 /HPF (ref 0–4)
SPECIFIC GRAVITY UA: >=1.03 (ref 1–1.03)
URINE TYPE: ABNORMAL
UROBILINOGEN, URINE: 0.2 E.U./DL
WBC UA: 1 /HPF (ref 0–5)

## 2021-01-26 ENCOUNTER — PATIENT MESSAGE (OUTPATIENT)
Dept: FAMILY MEDICINE CLINIC | Age: 71
End: 2021-01-26

## 2021-01-26 NOTE — TELEPHONE ENCOUNTER
From: Carolynn Briseno  To: Lyric Jacob MD  Sent: 1/26/2021 12:20 PM EST  Subject: Non-Urgent [facility] Dr. Cait Durand,    Back in June( 6/12/20), I asked you for a recommendation to get a second opinion for back surgery. My initial consultation was with Dr. Samantha Linder. I will probably get surgery after getting the covid vaccine. I thought I'd still have your response, but can't find it. Would you please provide another recommendation.     Thank you    Veronica Reyes

## 2021-02-01 LAB — DIABETIC RETINOPATHY: NORMAL

## 2021-02-01 NOTE — PROGRESS NOTES
Swapnil Roque   1950, 79 y.o. male   [id number]       Referring Provider: Emily De La Cruz MD   Referral Type: In an order in [address] [place] [address]    Reason for Visit: Evaluation of suspected change in hearing, tinnitus, or balance. ADULT AUDIOLOGIC EVALUATION      Swapnil Roque is a 79 y.o. male seen today, 2/3/2021 for an initial audiologic evaluation. Temperature screening at intake: Within normal limits    AUDIOLOGIC AND OTHER PERTINENT MEDICAL HISTORY:        Swapnil Roque noted decreased hearing bilaterally, wife has noticed he doesn't hear as well; tinnitus bilaterally, present for many years, constant, has become more bothersome, especially noticeable at night; some dizziness - can feel unsteady on feet at times, also noted intermittent instances of vertigo - feels like he is moving or playing catch-up with movements, happens 1-2 time per year; family history of hearing loss - brother with hearing loss. Arden Del Valle denied otalgia, aural fullness, otorrhea, history of occupational/recreational noise exposure, history of head trauma, and history of ear surgery. IMPRESSIONS:       Today's results are consistent with bilateral high frequency sensorineural hearing loss with excellent word recognition for soft conversational speech and hypermobile TMs bilaterally. Discussed tinnitus management strategies, good communication strategies, protecting ears in loud environments, and future considerations for amplification. ASSESSMENT AND FINDINGS:       Otoscopy revealed: Clear ear canals bilaterally      RIGHT EAR:  Hearing Sensitivity: Within normal limits to mild through 3000 Hz precipitously sloping to moderately-severe sensorineural hearing loss. Speech Recognition Threshold: 10 dB HL  Word Recognition: Excellent (100%), based on NU-6 25-word list at 50 dBHL using recorded speech stimuli. This finding is consistent with hearing sensitivity. Tympanometry: Normal peak pressure with high compliance, Type Ad tympanogram, consistent with hypermobile tympanic membrane. LEFT EAR:  Hearing Sensitivity: Within normal limits through 3000 Hz precipitously sloping to moderately-severe sensorineural hearing loss. Speech Recognition Threshold: 15 dB HL  Word Recognition: Excellent (100%), based on NU-6 25-word list at 50 dBHL using recorded speech stimuli. This finding is consistent with hearing sensitivity. Tympanometry: Normal peak pressure with high compliance, Type Ad tympanogram, consistent with hypermobile tympanic membrane. Reliability: Good  Transducer: Inserts    See scanned audiogram dated 2/3/2021 for results. PATIENT EDUCATION:       The following items were discussed with the patient:    - Good Communication Strategies   - Hearing Loss and Hearing Aids   - Tinnitus Management Strategies    - Noise-Induced Hearing Loss and use of Hearing Protection Devices (HPDs)    Educational information was shared in the After Visit Summary. RECOMMENDATIONS:                                                                                                                                                                                                                                                                      The following items are recommended based on patient report and results from today's appointment:   - Continue medical follow-up with Carmen José MD.   - Recommended discussing dizziness with Dr. Anurag Tenorio and to consider ENT evaluation, if warranted. - Retest hearing as medically indicated and/or sooner if a change in hearing is noted. - Discussed future considerations for amplification, including hearing aids and tinnitus management. If desired, schedule a Hearing Aid Evaluation (HAE) appointment to discuss hearing aid options. - Utilize \"Good Communication Strategies\" as discussed to assist in speech understanding with communication partners. - Maintain a sound enriched environment to assist in the management of tinnitus symptoms.      - Use hearing protection devices (HPDs), such as protective ear muffs and ear plugs, when exposed to dangerous sound levels. [facility] [place], [place]  Audiologist      Chart CC'd to:  Tonio Winn MD       Degree of   Hearing Sensitivity dB Range   Within Normal Limits (WNL) 0 - 20   Mild 20 - 40   Moderate 40 - 55   Moderately-Severe 55 - 70   Severe 70 - 90   Profound 90 +

## 2021-02-02 ENCOUNTER — PATIENT MESSAGE (OUTPATIENT)
Dept: FAMILY MEDICINE CLINIC | Age: 71
End: 2021-02-02

## 2021-02-02 ENCOUNTER — TELEPHONE (OUTPATIENT)
Dept: FAMILY MEDICINE CLINIC | Age: 71
End: 2021-02-02

## 2021-02-02 DIAGNOSIS — E78.00 PURE HYPERCHOLESTEROLEMIA: ICD-10-CM

## 2021-02-02 RX ORDER — CLOPIDOGREL BISULFATE 75 MG/1
75 TABLET ORAL DAILY
Qty: 90 TABLET | Refills: 1 | Status: SHIPPED | OUTPATIENT
Start: 2021-02-02 | End: 2022-01-27 | Stop reason: ALTCHOICE

## 2021-02-02 RX ORDER — ATORVASTATIN CALCIUM 20 MG/1
20 TABLET, FILM COATED ORAL DAILY
Qty: 90 TABLET | Refills: 1 | Status: SHIPPED | OUTPATIENT
Start: 2021-02-02 | End: 2021-09-16

## 2021-02-02 NOTE — TELEPHONE ENCOUNTER
Requested Prescriptions     Pending Prescriptions Disp Refills    atorvastatin (LIPITOR) 20 MG tablet 90 tablet 0     Sig: Take 1 tablet by mouth daily FOLLOW UP LABS ARE NEEDED.     clopidogrel (PLAVIX) 75 MG tablet 90 tablet 0     Sig: Take 1 tablet by mouth daily     Last ov 01/20/21  Last labs 1/16/21

## 2021-02-03 ENCOUNTER — PROCEDURE VISIT (OUTPATIENT)
Dept: AUDIOLOGY | Age: 71
End: 2021-02-03
Payer: MEDICARE

## 2021-02-03 DIAGNOSIS — R42 DIZZINESS AND GIDDINESS: ICD-10-CM

## 2021-02-03 DIAGNOSIS — H90.3 SENSORINEURAL HEARING LOSS, BILATERAL: Primary | ICD-10-CM

## 2021-02-03 DIAGNOSIS — H93.13 TINNITUS, BILATERAL: ICD-10-CM

## 2021-02-03 PROCEDURE — 92567 TYMPANOMETRY: CPT | Performed by: AUDIOLOGIST

## 2021-02-03 PROCEDURE — 92557 COMPREHENSIVE HEARING TEST: CPT | Performed by: AUDIOLOGIST

## 2021-02-03 NOTE — Clinical Note
Dr. Soria Guess,    Thank you for your referral for audiologic testing on this patient. Today's results are consistent with bilateral high frequency sensorineural hearing loss with excellent word recognition for soft conversational speech and hypermobile TMs bilaterally. Discussed tinnitus management strategies, good communication strategies, protecting ears in loud environments, and future considerations for amplification. If you have any questions, or if there is anything else you need, please let me know.       Best,    [address] [place], [place]  Audiologist  ---  [address]  ENT - Audiology

## 2021-02-03 NOTE — PATIENT INSTRUCTIONS
Tinnitus: Overview and Management Strategies          Many people have some ringing sounds in their ears once in a while. You may hear a roar, a hiss, a tinkle, or a buzz. The sound usually lasts only a few minutes. If it goes on all the time, you may have tinnitus. Tinnitus is usually caused by long-term exposure to loud noise. This damages the nerves in the inner ear. It can occur with all types of hearing loss. It may be a symptom of almost any ear problem. Tinnitus may be caused by a buildup of earwax. Or, it may be caused by ear infections or certain medicines (especially antibiotics or large amounts of aspirin). You can also hear noises in your ears because of an injury to the ears, drinking too much alcohol or caffeine, or a medical condition. Other conditions may also contribute to tinnitus, including: head and neck trauma, temporomandibular joint disorder (TMJ), sinus pressure and barometric trauma, traumatic brain injury, metabolic disorders, autoimmune disorders, stress, and high blood pressure. You may need tests to evaluate your hearing and to find causes of long-lasting tinnitus. Your doctor may suggest one or more treatments to help you cope with the tinnitus. You can also do things at home to help reduce symptoms. Follow-up care is a key part of your treatment and safety. Be sure to make and go to all appointments, and call your doctor if you are having problems. It's also a good idea to know your test results and keep a list of the medicines you take. How can you care for yourself at home? · Limit or cut out alcohol, caffeine, and sodium. They can make your symptoms worse. · Do not smoke or use other tobacco products. Nicotine reduces blood flow to the ear and makes tinnitus worse. If you need help quitting, talk to your doctor about stop-smoking programs and medicines. These can increase your chances of quitting for good. · Talk to your doctor about whether to stop taking aspirin and similar products such as ibuprofen or naproxen. · Get exercise often. It can help improve blood flow to the ear. Ways to manage/cope with tinnitus  Some tinnitus may last a long time. To manage your tinnitus, try to:  · Avoid noises that you think caused your tinnitus. If you can't avoid loud noises, wear earplugs or earmuffs. · Ignore the sound by paying attention to other things. Keeping your brain busy with other tasks or background noise can help your brain not focus on the tinnitus. · Try to not give the tinnitus an emotional reaction. Do your best to ignore the sound and not let it bother you. Relax using biofeedback, meditation, or yoga. Feeling stressed and being tired can make tinnitus worse. · Play music or white noise to help you sleep. Background noise may cover up the noise that you hear in your ears. You can buy a tabletop machine or a device that sits under your pillow to play soothing sounds, like ocean waves. · Smart phones have free apps, such as Whist, Relax Melodies, ReSound Relief, and White Noise Lite. These apps have different types of sounds/noise, some of which you can blend together to find sounds that are most soothing to you. · Hearing aid technology, especially when there is some hearing loss, may help reduce tinnitus symptoms by giving your brain better access to the sounds it is missing. There are some hearing aids with built-in noise generator programs, which may help when amplification alone is not enough. Additional resources may be found through the [employer] at www.dada.org    When should you call for help? Call 911 anytime you think you may need emergency care. For example, call if:    · You have symptoms of a stroke. These may include:  ? Sudden numbness, tingling, weakness, or loss of movement in your face, arm, or leg, especially on only one side of your body. ? Sudden vision changes. ? Sudden trouble speaking. ? Sudden confusion or trouble understanding simple statements. ? Sudden problems with walking or balance. ? A sudden, severe headache that is different from past headaches. Call your doctor now or seek immediate medical care if:    · You develop other symptoms. These may include hearing loss (or worse hearing loss), balance problems, dizziness, nausea, or vomiting. Watch closely for changes in your health, and be sure to contact your doctor if:    · Your tinnitus moves from both ears to one ear. · Your hearing loss gets worse within 1 day after an ear injury. · Your tinnitus or hearing loss does not get better within 1 week after an ear injury. · Your tinnitus bothers you enough that you want to take medicines to help you cope with it. If you notice changes in your tinnitus and/or your hearing, it is recommended that you have your hearing tested by your audiologist and to follow-up with your physician that manages your hearing loss (such as your ENT or Primary Care doctor). Good Communication Strategies    Communication can be challenging for anyone, but can be especially difficult for those with some degree of hearing loss. While we may not be able to control every factor that may lead to difficulty with communication, there are Good Communication Strategies that we can all use in our day-to-day lives. Communication takes both parties working together for it to be successful. Tips as a Listener:   1. Control your environment. It is important to limit the amount of background noise in the room when possible. You should also consider having a good light source in the room to best see the other person. 2. Ask for clarification. Instead of saying \"What?\", you can use parts of what you heard to make a new question. For example, if you heard the word \"Thursday\" but not the rest of the week, you may ask \"What was that about Thursday? \" or \"What did you want to do Thursday? \". This shows the person talking that you are listening and will help them better explain what they are saying. 3. Be an advocate for yourself. If you are hearing but not understanding, tell the other person \"I can hear you, but I need you to slow down when you speak. \"  Or if someone is facing the other direction, say \"I cannot hear you when you are not looking at me when we talk. \"       Tips as a Talker:   - Sit or stand 3 to 6 feet away to maximize audibility         -- It is unrealistic to believe someone else will fully hear your message if you are speaking from across the room or in a different room in the house   - Stay at eye level to help with visual cues   - Make sure you have the persons attention before speaking   - Use facial expressions and gestures to accentuate your message   - Raise your voice slightly (do not scream)   - Speak slowly and distinctly   - Use short, simple sentences   - Rephrase your words if the person is having a hard time understanding you    - To avoid distortion, dont speak directly into a persons ear      Some additional items that may be helpful:   - Remain patient - this is important for both parties   - Write down items that still cannot be heard/understood. You may write with pen/paper or consider typing/texting on a cell phone or smart device. - If background noise is unavoidable, try to keep yourself in a good position in the room. By sitting at a goss on the side of the restaurant (preferably a corner), it will be easier to communicate than if you were sitting at a table in the middle with background noise surrounding you. Keep yourself positioned away from music speakers or heavy foot traffic.   - If you have difficulty with the television, consider these options:      -- Use closed-captioning, which is a setting you can turn on that displays the spoken words in a written form on the screen. There may be a slight delay, but this can help fill in missing information. This can be especially helpful when watching programs with accented speech. -- Consider use of a sound bar or speakers that come from the front of the TV. With modern flat screen TVs, many of them have speakers that come out of the back of the device, which makes sound bounce off the wall behind it, then go into the room. Sound bars can allow the sound to go straight in your direction and can improve sound quality. -- Consider ear level devices to help improve the volume and/or sound quality of the program.  There are devices that work like headphones that you can adjust the volume for your ears while others can have the volume at a more comfortable level, such as \"TV Ears\". Most hearing aids have devices that allow them to connect directly to the TV and improve sound quality. Hearing Loss: Care Instructions  Your Care Instructions      Hearing loss is a sudden or slow decrease in how well you hear. It can range from mild to profound. Permanent hearing loss can occur with aging, and it can happen when you are exposed long-term to loud noise. Examples include listening to loud music, riding motorcycles, or being around other loud machines. ? TTY (text telephone). This lets you type messages back and forth on the telephone instead of talking or listening. These devices are also called TDD. When messages are typed on the keyboard, they are sent over the phone line to a receiving TTY. The message is shown on a monitor. · Use pagers, fax machines, text, and email if it is hard for you to communicate by telephone. · Try to learn a listening technique called speech-reading. It is not lip-reading. You pay attention to people's gestures, expressions, posture, and tone of voice. These clues can help you understand what a person is saying. Face the person you are talking to, and have him or her face you. Make sure the lighting is good. You need to see the other person's face clearly. · Think about counseling if you need help to adjust to your hearing loss. When should you call for help? Watch closely for changes in your health, and be sure to contact your doctor if:    · You think your hearing is getting worse. · You have new symptoms, such as dizziness or nausea. Noise-Induced Hearing Loss  What it is, and what you can do to prevent it    Exposure to loud sounds, in an occupational setting or recreational, can cause permanent hearing loss. Sound is measured in decibels (dB). Noise-induced hearing loss is the ONLY type of preventable hearing loss. Hearing loss related to noise exposure can occur at any age. There are small sensory cells, called inner and outer hair cells, within the inner ear (cochlea). These cells process the loudness (intensity) and pitch (frequency) of sound and send the signal to the brain via our auditory nerve (vestibulocochlear nerve, cranial nerve VIII). When these cells are damaged, they can result in permanent hearing loss and/or tinnitus. The hair cells responsible for high frequency sounds, like birds chirping, are most likely to be damaged due to loud sounds. The high frequency sounds are also very important for our clarity and understanding of speech. OCCUPATIONAL NOISE EXPOSURE RECREATIONAL NOISE EXPOSURE   Some jobs may have exposure to loud sounds in the workplace. These jobs may include but are not limited to:  ?    ? Factory settings  ? Manufacturing  ? Construction  ? Welding  ? Landscaping  ? Hairdressing/hairstyling  ? Musicians  ? Air Traffic   ? ... And more! Many activities outside of work may cause permanent hearing loss. These activities may include but are not limited to:  ? Lawnmowers, leaf blowers  ? Farming equipment and animals (such as pigs squealing)  ? Chainsaws and other power tools  ? Playing musical instruments and/or singing  ? Listening to music too loudly - at concerts, through stereo, through ear buds or headphones  ? Attending sporting events  ? Attending fireworks shows or using fireworks at home  ? Use of firearms  ? ... And more! REDUCE OR PROTECT YOUR EARS FROM NOISE EXPOSURE    To do your best to avoid noise-induced hearing loss, here are some tips:  ? Limit exposure to loud sounds. 85 dB (decibels) is safe for 8 hours. As sounds are louder, the length of time the sound is safe lessens. These numbers are cumulative across a 24-hour period.   (NIOSH and CDC, 2002)  o 85 dB is safe for 8 hours  o 88 dB is safe for 4 hours  o 91 dB is safe for 2 hours  o 94 dB is safe for 1 hour  o 97 dB is safe for 30 minutes o 100 dB is safe for 15 minutes  o 103 dB is safe for 7.5 minutes  o 106 dB is safe for 3.75 minutes  o 109 dB is safe for LESS THAN 2 minutes  o 112 dB is safe for LESS THAN 1 minute  o 115 dB is safe for ~ 30 seconds  o 130 dB can cause IMMEDIATE hearing loss  ? If you are unsure if a sound is too loud, consider checking the sound level with a \"sound level meter\". There are apps on smart devices, such as \"Decibel X\", that can measure the loudness of the sound. They are not as accurate as expensive equipment used by scientists, but it will give you a guesstimate of how loud the sound is, and if it may be damaging to your hearing. ? If you cannot avoid loud sounds, here are ways to reduce your exposure:  o 1. Wear hearing protection  ? Ear plugs and protective ear muffs can be used to reduce the intensity of the sound. The higher the NRR (noise reduction rating), the better reduction of the intensity of the sound   o 2. Turn the volume down  ? When listening to music, turn the volume down, especially when wearing ear buds or headphones. A good rule of thumb is to not go beyond the middle setting on your device. If you can't hear someone talking to you from arm's length away, your music may be at a level that it can cause damage. If someone else can hear your music from 3 feet away, it may also be at a level that it can cause damage. o 3. Walk away from the sound  ? If you do not have the ability to wear hearing protection or turn down the volume of the sound, you should do your best to move away from the source of the sound. ? Sound decreases in intensity as we move further from the source. The sound will decrease by 6 dB for every doubling of distance from the sound source.     TYPES OF HEARING PROTECTION The most common types of hearing protection are protective ear muffs and ear plugs. Protective ear muffs are commonly found at home improvement or sporting good stores, they can be worn time and time again and are great if you need to take your hearing protection off frequently. Ear plugs are often made of foam or soft silicone. The foam ones are designed for one-time use, while silicone ear plugs may be used multiple times. There are also \"filtered\" ear plugs that help provide even attenuation of the sound across all frequencies. These are great for listening to music or going to concerts, and allow for better understanding of speech in louder environments. They can be purchased at music stores or online retailers (search \"Ety Plugs\" or \"filtered ear plugs\"), or custom earmolds can be made with an audiologist.    There are \"custom\" hearing protection devices that you can further discuss with your audiologist based on your specific needs, if desired. Exposure to these sounds may cause permanent damage to your hearing.   If you suspect your hearing has changed, it is recommended that you have your hearing tested by your audiologist.

## 2021-02-03 NOTE — TELEPHONE ENCOUNTER
Please call the patient to see if he has had any improvement with sinus congestion with the Doxycycline. Any headaches? Fever? Cough? post nasal drip ? If some improvement, will have him take an additional week and will prescribe. If no improvement will need to change things.

## 2021-02-03 NOTE — TELEPHONE ENCOUNTER
From: Iliana Torre  To: Antoine Kamara MD  Sent: 2/2/2021  7:31 PM EST  Subject: Non-Urgent Tessa Ayoub. I have finished the 10 days of Doxycycline  mg tabs. I  still am experiencing clear sinus drainage and  congestion. Next steps?     Thank you     Fanta Leon

## 2021-02-04 RX ORDER — DOXYCYCLINE HYCLATE 100 MG
100 TABLET ORAL 2 TIMES DAILY
Qty: 14 TABLET | Refills: 0 | Status: SHIPPED | OUTPATIENT
Start: 2021-02-04 | End: 2021-02-11

## 2021-03-01 ENCOUNTER — HOSPITAL ENCOUNTER (OUTPATIENT)
Dept: MRI IMAGING | Age: 71
Discharge: HOME | End: 2021-03-01
Payer: MEDICARE

## 2021-03-01 DIAGNOSIS — M48.062 SPINAL STENOSIS OF LUMBAR REGION WITH NEUROGENIC CLAUDICATION: ICD-10-CM

## 2021-03-01 PROCEDURE — 72148 MRI LUMBAR SPINE W/O DYE: CPT

## 2021-03-03 ENCOUNTER — OFFICE VISIT (OUTPATIENT)
Dept: ORTHOPEDIC SURGERY | Age: 71
End: 2021-03-03
Payer: MEDICARE

## 2021-03-03 VITALS — HEIGHT: 69 IN | BODY MASS INDEX: 30.81 KG/M2 | WEIGHT: 208 LBS | TEMPERATURE: 96.9 F

## 2021-03-03 DIAGNOSIS — M65.332 TRIGGER MIDDLE FINGER OF LEFT HAND: ICD-10-CM

## 2021-03-03 PROCEDURE — G8428 CUR MEDS NOT DOCUMENT: HCPCS | Performed by: PHYSICIAN ASSISTANT

## 2021-03-03 PROCEDURE — 99213 OFFICE O/P EST LOW 20 MIN: CPT | Performed by: PHYSICIAN ASSISTANT

## 2021-03-03 PROCEDURE — 3017F COLORECTAL CA SCREEN DOC REV: CPT | Performed by: PHYSICIAN ASSISTANT

## 2021-03-03 PROCEDURE — G8417 CALC BMI ABV UP PARAM F/U: HCPCS | Performed by: PHYSICIAN ASSISTANT

## 2021-03-03 PROCEDURE — 1123F ACP DISCUSS/DSCN MKR DOCD: CPT | Performed by: PHYSICIAN ASSISTANT

## 2021-03-03 PROCEDURE — 20550 NJX 1 TENDON SHEATH/LIGAMENT: CPT | Performed by: PHYSICIAN ASSISTANT

## 2021-03-03 PROCEDURE — G8484 FLU IMMUNIZE NO ADMIN: HCPCS | Performed by: PHYSICIAN ASSISTANT

## 2021-03-03 PROCEDURE — 4040F PNEUMOC VAC/ADMIN/RCVD: CPT | Performed by: PHYSICIAN ASSISTANT

## 2021-03-03 PROCEDURE — 1036F TOBACCO NON-USER: CPT | Performed by: PHYSICIAN ASSISTANT

## 2021-03-03 RX ORDER — TRIAMCINOLONE ACETONIDE 40 MG/ML
20 INJECTION, SUSPENSION INTRA-ARTICULAR; INTRAMUSCULAR ONCE
Status: COMPLETED | OUTPATIENT
Start: 2021-03-03 | End: 2021-03-03

## 2021-03-03 RX ADMIN — TRIAMCINOLONE ACETONIDE 20 MG: 40 INJECTION, SUSPENSION INTRA-ARTICULAR; INTRAMUSCULAR at 11:28

## 2021-03-03 NOTE — PATIENT INSTRUCTIONS
Information & Instructions   After Finger, Hand, Wrist, or Elbow Injection    Darwin Chan MD    You have received an injection of local anesthetic (Bupivicaine without Epinephrine) for comfort & a steroid (Kenalog) for its strong anti-inflammatory effects. In order to give the medication a chance to reduce your inflammation and discomfort, it is recommended that you take it easy for a day or so. You may use your hand and arm as you feel comfortable, but you should avoid highly strenuous activity and heavy use for several days. Relief from the injection will often not begin for several days, and you may not feel full relief for up to one month. It is not uncommon to experience some local discomfort or pain at or around the injection site for a few days. To relieve these symptoms you may do the following if you feel necessary:       Apply ice to the affected area 20 minutes on and 20 minutes off. Do not apply ice directly to the skin. Use a thin layer (T-shirt, pillowcase, towel, etc.) to protect the skin. - If allowed by your other medical physicians, you may take -     2 Tylenol extra strength tablets every 4-6 hours       1-2 Aleve tablets twice a day     2-3 Advil tablets two to three times a day    If you are diabetic, the steroid medication may increase your blood sugar, so you are advised to monitor your sugar more closely so you can adjust it accordingly for a few days following your injection. If you need assistance with the control of you blood sugar, please contact you primary care physician for further advice. I will request that you please call the office one month after your injection at [phone] if you have not experienced relief of your symptoms (unless I have instructed you otherwise). If your injection has given you good relief of you symptoms as expected, then you only need to call the office if your symptoms return.

## 2021-03-03 NOTE — PROGRESS NOTES
Mr. Reymundo Catherine is a 79 y.o. right handed man  who is seen today in Hand Surgical Consultation at the request of Sharita Talavera MD.    He presents today regarding left symptoms which have been present for approximately 1 years. A history of antecedent trauma or injury is Absent. He reports symptoms to include mild pain and stiffness with occasional popping, catching or locking of the left Index Finger, Middle Finger and Ring Finger. Finger symptoms are Frequently worse in the morning or overnight. He reports mild pain located at the base of the symptomatic finger(s). Symptoms are worsening over time. Previous treatment has included conservative measures. He does not claim relation of his symptoms to his required work activities. He has not undergone any form of testing. I have today reviewed with Reymundo Catherine the clinically relevant, past medical history, medications, allergies,  family history, social history, and Review Of Systems & I have documented any details relevant to today's presenting complaints in my history above. Mr. Simeon Ocasios self-reported past medical history, medications, allergies,  family history, social history, and Review Of Systems have been scanned into the chart under the \"Media\" tab. Physical Exam:  Mr. Simeon Del Valle's most recent vitals:  Vitals  Temp: 96.9 °F (36.1 °C)  Temp Source: Infrared  Height: 5' 9\" (175.3 cm)  Weight: 208 lb (94.3 kg)    He is well nourished, oriented to person, place & time. He demonstrates appropriate mood and affect as well as normal gait and station. Skin: Normal in appearance, Normal Color and Free of Lesions Bilaterally   Digital range of motion is without significant limitation bilaterally. Inducible triggering is noted upon flexion in the left Middle Finger. There is not an associated flexion contracture of the PIP joint. No other digit demonstrates evidence for stenosing tenosynovitis bilaterally. Wrist range of motion is Full and equal bilaterally bilaterally. Sensation is normal in the Whole Hand bilaterally  Vascular examination reveals normal, good capillary refill and good color bilaterally  Swelling is mild in the symptomatic digit, absent elsewhere bilaterally  There is no evidence of gross joint instability bilaterally. Muscular strength is clinically appropriate bilaterally. Examination for Stenosing Tenosynovitis demonstrates mild tenderness, thickening & nodularity at the A-1 pulley(s) of the Left Index Finger, Middle Finger and Ring Finger. There is a palpable Nota's Node. There is Inducible triggering on active flexion with pain. No other digits demonstrate evidence of Stenosing Tenosynovitis. Examination of the first dorsal extensor compartments of the wrist demonstrates no thickening or fullness. There is no tenderness to palpation over the extensor tendons. Pain is not elicited with resisted thumb extension, and Finklestein's maneuver is negative bilaterally. Impression:  Mr. Pedrito Machado is showing clinical evidence of Stenosing Tenosynovitis (Trigger Finger) and presents requesting further treatment.     Plan: I have had a thorough discussion with Mr. Nurys Parker regarding the treatment options available for his initially presenting Left Middle Finger stenosing tenosynovitis, which is causing him functional limitations. I have outlined for Mr. Nurys Parker the benefits and consequences of the various treatment modalities, including a reasonable expectation for the long term success and the likelihood that further more aggressive treatment may be required for his current presenting condition. Based upon our current discussion and a reasonable understating of the options available to him, Mr. Nurys Parker has selected to proceed with  an injection to the left Middle Finger Flexor Tendon Sheath. I have outlined for him the nature of the injection, and the pre, mary jane and post injection considerations and the appropriate expectations for this injection. I have clearly explained to him that the above outlined treatment plan should not be expected to 'cure' his stenosing tenosynovitis, but we are rather treating the symptoms with which he presents. He has understood that in order to achieve long lasting relief of his symptoms and to prevent future worsening or further damage, that definitive surgical treatment would be required. Mr. Nurys Parker  voiced an appropriate understanding of our discussion, the options available to him, and of the expectations of his selected  treatment. He did wish to proceed with Left Middle Finger Flexor Tendon Sheath injection. Procedure:  left Middle Finger Trigger Finger Injection  [first Injection]: After full discussion of the nature of this process and outlining a treatment plan with Mr. Nurys Parker, we discussed the complications, limitations, expectations, alternatives, and risks of injection of the flexor tendon sheath. He understood this information well and verbally consented to this treatment.

## 2021-03-03 NOTE — Clinical Note
Dear  Cris Agosto MD,    Thank you very much for your referral or . Nely Cleveland to me for evaluation and treatment of his Hand & Wrist condition. I appreciate your confidence in me and thank you for allowing me the opportunity to care for your patients. If I can be of any further assistance to you on this or any other patient, please do not hesitate to contact me.

## 2021-03-05 ENCOUNTER — HOSPITAL ENCOUNTER (OUTPATIENT)
Dept: PHYSICAL THERAPY | Age: 71
Setting detail: THERAPIES SERIES
Discharge: HOME | End: 2021-03-05
Payer: MEDICARE

## 2021-03-05 PROCEDURE — L3010 FOOT LONGITUDINAL ARCH SUPPO: HCPCS | Performed by: PHYSICAL THERAPIST

## 2021-03-05 PROCEDURE — L3020 FOOT LONGITUD/METATARSAL SUP: HCPCS | Performed by: PHYSICAL THERAPIST

## 2021-03-05 NOTE — FLOWSHEET NOTE
Pt presents to PT for custom orthotic scan. Pt has been wearing footmaxx orthotics since 2018, really likes orthotic but has worn through to the bottom at the big toe on the R. Pt exhibits high level of pressure at B calcaneus and metatarsal heads. Pt exhibits abnormal weight distribution from heel strike to toe off that is indicative of pes planus. Pt would benefit from custom orthotics with addition of heelmaxx cushion and metbar to off load areas of high pressure. Pt will be contacted when orthotics are received. Electronically signed by: Mag Hearn PT, DPT, OCS  Physical Therapist  [phone]  [email or web address]

## 2021-03-18 ENCOUNTER — OFFICE VISIT (OUTPATIENT)
Dept: FAMILY MEDICINE CLINIC | Age: 71
End: 2021-03-18
Payer: MEDICARE

## 2021-03-18 VITALS
SYSTOLIC BLOOD PRESSURE: 120 MMHG | OXYGEN SATURATION: 97 % | HEIGHT: 68 IN | TEMPERATURE: 98 F | WEIGHT: 202.6 LBS | BODY MASS INDEX: 30.71 KG/M2 | RESPIRATION RATE: 21 BRPM | HEART RATE: 60 BPM | DIASTOLIC BLOOD PRESSURE: 68 MMHG

## 2021-03-18 DIAGNOSIS — E78.00 PURE HYPERCHOLESTEROLEMIA: ICD-10-CM

## 2021-03-18 DIAGNOSIS — K21.9 GASTROESOPHAGEAL REFLUX DISEASE WITHOUT ESOPHAGITIS: ICD-10-CM

## 2021-03-18 DIAGNOSIS — M48.02 SPINAL STENOSIS IN CERVICAL REGION: ICD-10-CM

## 2021-03-18 DIAGNOSIS — F34.1 DYSTHYMIC DISORDER: ICD-10-CM

## 2021-03-18 DIAGNOSIS — I10 ESSENTIAL HYPERTENSION: ICD-10-CM

## 2021-03-18 DIAGNOSIS — M50.30 DEGENERATION OF CERVICAL INTERVERTEBRAL DISC: ICD-10-CM

## 2021-03-18 DIAGNOSIS — Z01.818 PRE-OP EXAM: Primary | ICD-10-CM

## 2021-03-18 DIAGNOSIS — E11.9 TYPE 2 DIABETES MELLITUS WITHOUT COMPLICATION, WITHOUT LONG-TERM CURRENT USE OF INSULIN (HCC): ICD-10-CM

## 2021-03-18 DIAGNOSIS — I25.10 CORONARY ARTERY DISEASE INVOLVING NATIVE CORONARY ARTERY OF NATIVE HEART WITHOUT ANGINA PECTORIS: ICD-10-CM

## 2021-03-18 DIAGNOSIS — M48.061 SPINAL STENOSIS OF LUMBAR REGION, UNSPECIFIED WHETHER NEUROGENIC CLAUDICATION PRESENT: ICD-10-CM

## 2021-03-18 DIAGNOSIS — M51.36 DDD (DEGENERATIVE DISC DISEASE), LUMBAR: ICD-10-CM

## 2021-03-18 PROCEDURE — G8484 FLU IMMUNIZE NO ADMIN: HCPCS | Performed by: FAMILY MEDICINE

## 2021-03-18 PROCEDURE — 99213 OFFICE O/P EST LOW 20 MIN: CPT | Performed by: FAMILY MEDICINE

## 2021-03-18 PROCEDURE — 93000 ELECTROCARDIOGRAM COMPLETE: CPT | Performed by: FAMILY MEDICINE

## 2021-03-18 PROCEDURE — G8427 DOCREV CUR MEDS BY ELIG CLIN: HCPCS | Performed by: FAMILY MEDICINE

## 2021-03-18 PROCEDURE — G8417 CALC BMI ABV UP PARAM F/U: HCPCS | Performed by: FAMILY MEDICINE

## 2021-03-18 PROCEDURE — 2022F DILAT RTA XM EVC RTNOPTHY: CPT | Performed by: FAMILY MEDICINE

## 2021-03-18 RX ORDER — NIZATIDINE 150 MG/1
150 CAPSULE ORAL 2 TIMES DAILY
Qty: 180 CAPSULE | Refills: 1 | Status: SHIPPED | OUTPATIENT
Start: 2021-03-18 | End: 2021-05-14

## 2021-03-18 RX ORDER — METOPROLOL SUCCINATE 25 MG/1
25 TABLET, EXTENDED RELEASE ORAL DAILY
Qty: 90 TABLET | Refills: 0 | Status: SHIPPED | OUTPATIENT
Start: 2021-03-18 | End: 2021-06-18 | Stop reason: SDUPTHER

## 2021-03-18 RX ORDER — MULTIVIT WITH MINERALS/LUTEIN
1000 TABLET ORAL DAILY
COMMUNITY

## 2021-03-18 RX ORDER — LISINOPRIL 10 MG/1
10 TABLET ORAL DAILY
Qty: 90 TABLET | Refills: 1 | Status: SHIPPED | OUTPATIENT
Start: 2021-03-18 | End: 2021-12-22 | Stop reason: SDUPTHER

## 2021-03-19 RX ORDER — FAMOTIDINE 20 MG/1
20 TABLET, FILM COATED ORAL 2 TIMES DAILY
Qty: 180 TABLET | Refills: 1 | Status: SHIPPED | OUTPATIENT
Start: 2021-03-19 | End: 2022-11-02

## 2021-03-19 NOTE — TELEPHONE ENCOUNTER
Pharmacy tech called stating the pt is requesting a change to his medication. States the patient was prescribe Axid but this is $300 for his insurance and he would like to have Pepcid instead because it is cheaper.      Current medication wanting to change:    nizatidine (AXID) 150 MG capsule [[id number]]     Order Details  Dose: 150 mg Route: Oral Frequency: 2 TIMES DAILY   Dispense Quantity: 180 capsule Refills: 1          Sig: Take 1 capsule by mouth 2 times daily         Start Date: 03/18/21 End Date: --   Written Date: 03/18/21 Expiration Date: 03/18/22     Pharmacy    [id number], [address] [place] [address] -  [phone]      Please advise, thanks

## 2021-03-19 NOTE — TELEPHONE ENCOUNTER
Please advise  Thank you    Pt requesting famotidine (PEPCID) tablet 20 mg    Pharmacy tech called stating the pt is requesting a change to his medication. States the patient was prescribe Axid but this is $300 for his insurance and he would like to have Pepcid instead because it is cheaper.

## 2021-05-14 ENCOUNTER — OFFICE VISIT (OUTPATIENT)
Dept: FAMILY MEDICINE CLINIC | Age: 71
End: 2021-05-14
Payer: MEDICARE

## 2021-05-14 VITALS
WEIGHT: 205.6 LBS | HEART RATE: 63 BPM | SYSTOLIC BLOOD PRESSURE: 124 MMHG | OXYGEN SATURATION: 96 % | HEIGHT: 68 IN | TEMPERATURE: 97.3 F | DIASTOLIC BLOOD PRESSURE: 72 MMHG | RESPIRATION RATE: 17 BRPM | BODY MASS INDEX: 31.16 KG/M2

## 2021-05-14 DIAGNOSIS — M48.061 SPINAL STENOSIS OF LUMBAR REGION, UNSPECIFIED WHETHER NEUROGENIC CLAUDICATION PRESENT: ICD-10-CM

## 2021-05-14 DIAGNOSIS — M48.02 SPINAL STENOSIS IN CERVICAL REGION: ICD-10-CM

## 2021-05-14 DIAGNOSIS — M50.30 DEGENERATION OF CERVICAL INTERVERTEBRAL DISC: ICD-10-CM

## 2021-05-14 DIAGNOSIS — E11.9 TYPE 2 DIABETES MELLITUS WITHOUT COMPLICATION, WITHOUT LONG-TERM CURRENT USE OF INSULIN (HCC): ICD-10-CM

## 2021-05-14 DIAGNOSIS — I10 ESSENTIAL HYPERTENSION: ICD-10-CM

## 2021-05-14 DIAGNOSIS — I25.10 CORONARY ARTERY DISEASE INVOLVING NATIVE CORONARY ARTERY OF NATIVE HEART WITHOUT ANGINA PECTORIS: ICD-10-CM

## 2021-05-14 DIAGNOSIS — M51.36 DDD (DEGENERATIVE DISC DISEASE), LUMBAR: ICD-10-CM

## 2021-05-14 DIAGNOSIS — Z01.818 PREOP EXAMINATION: ICD-10-CM

## 2021-05-14 DIAGNOSIS — Z12.5 PROSTATE CANCER SCREENING: ICD-10-CM

## 2021-05-14 DIAGNOSIS — E78.00 HYPERCHOLESTEROLEMIA: ICD-10-CM

## 2021-05-14 DIAGNOSIS — F34.1 DYSTHYMIC DISORDER: ICD-10-CM

## 2021-05-14 DIAGNOSIS — Z01.818 PRE-OP EXAM: ICD-10-CM

## 2021-05-14 DIAGNOSIS — K21.9 GASTROESOPHAGEAL REFLUX DISEASE WITHOUT ESOPHAGITIS: ICD-10-CM

## 2021-05-14 DIAGNOSIS — Z01.818 PREOP EXAMINATION: Primary | ICD-10-CM

## 2021-05-14 LAB
A/G RATIO: 2.1 (ref 1.1–2.2)
ALBUMIN SERPL-MCNC: 5 G/DL (ref 3.4–5)
ALP BLD-CCNC: 85 U/L (ref 40–129)
ALT SERPL-CCNC: 64 U/L (ref 10–40)
ANION GAP SERPL CALCULATED.3IONS-SCNC: 11 MMOL/L (ref 3–16)
AST SERPL-CCNC: 53 U/L (ref 15–37)
BILIRUB SERPL-MCNC: 1 MG/DL (ref 0–1)
BUN BLDV-MCNC: 16 MG/DL (ref 7–20)
CALCIUM SERPL-MCNC: 9.6 MG/DL (ref 8.3–10.6)
CHLORIDE BLD-SCNC: 99 MMOL/L (ref 99–110)
CO2: 27 MMOL/L (ref 21–32)
CREAT SERPL-MCNC: 0.9 MG/DL (ref 0.8–1.3)
GFR AFRICAN AMERICAN: >60
GFR NON-AFRICAN AMERICAN: >60
GLOBULIN: 2.4 G/DL
GLUCOSE BLD-MCNC: 108 MG/DL (ref 70–99)
HCT VFR BLD CALC: 42.1 % (ref 40.5–52.5)
HEMOGLOBIN: 14.4 G/DL (ref 13.5–17.5)
MCH RBC QN AUTO: 30.8 PG (ref 26–34)
MCHC RBC AUTO-ENTMCNC: 34.2 G/DL (ref 31–36)
MCV RBC AUTO: 89.9 FL (ref 80–100)
PDW BLD-RTO: 13 % (ref 12.4–15.4)
PHOSPHORUS: 3.1 MG/DL (ref 2.5–4.9)
PLATELET # BLD: 190 K/UL (ref 135–450)
PMV BLD AUTO: 8.7 FL (ref 5–10.5)
POTASSIUM SERPL-SCNC: 4.2 MMOL/L (ref 3.5–5.1)
PROSTATE SPECIFIC ANTIGEN: 11.25 NG/ML (ref 0–4)
RBC # BLD: 4.68 M/UL (ref 4.2–5.9)
SODIUM BLD-SCNC: 137 MMOL/L (ref 136–145)
TOTAL PROTEIN: 7.4 G/DL (ref 6.4–8.2)
WBC # BLD: 7.4 K/UL (ref 4–11)

## 2021-05-14 PROCEDURE — 99213 OFFICE O/P EST LOW 20 MIN: CPT | Performed by: FAMILY MEDICINE

## 2021-05-14 PROCEDURE — G8417 CALC BMI ABV UP PARAM F/U: HCPCS | Performed by: FAMILY MEDICINE

## 2021-05-14 PROCEDURE — 2022F DILAT RTA XM EVC RTNOPTHY: CPT | Performed by: FAMILY MEDICINE

## 2021-05-14 PROCEDURE — G8427 DOCREV CUR MEDS BY ELIG CLIN: HCPCS | Performed by: FAMILY MEDICINE

## 2021-05-14 ASSESSMENT — PATIENT HEALTH QUESTIONNAIRE - PHQ9
SUM OF ALL RESPONSES TO PHQ QUESTIONS 1-9: 0
1. LITTLE INTEREST OR PLEASURE IN DOING THINGS: 0
SUM OF ALL RESPONSES TO PHQ QUESTIONS 1-9: 0

## 2021-05-14 NOTE — PROGRESS NOTES
Chief Complaint:     Rehan Velasquez is a 79 y.o. male who presents for a preoperative physical examination. He is scheduled to have L3-4 Bilateral hemilaminectomy done by Dr. Nunu Guzman at [facility] on 5/19/21. History of Present Illness:      Patient with chronic back pain that has not responded to conservative treatment. Patient denies chest pain, palpitations, or shortness of breath . EKG was done at [place] [address] , Dr. Winston Leonard office on 3/921. No personal or family history of bleeding, clotting, or anesthesia problems.      Past Medical History:   Diagnosis Date    AV block, 1st degree     CAD (coronary artery disease) 4/11    Dr. Timothy Adams    Calculus of kidney 12/1/2010    Chondromalacia of patella     DDD (degenerative disc disease), lumbar     L2-3; L4-5    DDD (degenerative disc disease), lumbar 04/2019    L1-2; L2-3, L3-4; L4-5; L5-S1    Degeneration of cervical intervertebral disc      Degenerative arthritis of left foot 07/2016    Depression and anxiety      Diabetes mellitus, type 2 ([address] 04/2019    diet controlled    ED (erectile dysfunction)      Esophageal reflux      Lumbar stenosis 02/2020    L2-3; L3-4; L4-5    Obstructive sleep apnea 2003    CPAP    Prediabetes 4/15    Pure hypercholesterolemia      Retinal tears 1980, 1997    Spinal stenosis in cervical region      Testosterone deficiency 4/12    Tricuspid regurgitation      Ventricular tachycardia, nonsustained         Review of patient's past surgical history indicates:     Past Surgical History:   Procedure Laterality Date    APPENDECTOMY  1958    CERVICAL FUSION  12/9/2010    ACDF C7-T1with Bilateral C8 Root Decompression, Zero & Impant w/synthesis Removal of C7 screw on Left   [address]    right    KNEE SURGERY  1994    left    KNEE SURGERY  2001    right    PAIN MANAGEMENT PROCEDURE N/A 3/18/2020    BILATERAL L3 TRANSFORAMINAL  EPIDURAL STEROID INJECTION WITH FLUOROSCOPY Tobacco Use    Smoking status: Never Smoker    Smokeless tobacco: Never Used   Substance Use Topics    Alcohol use: Yes     Alcohol/week: 3.0 - 6.0 standard drinks     Types: 3 - 6 Cans of beer per week     Comment: weekly    Drug use: No        Family History   Problem Relation Age of Onset    Heart Disease Mother     Heart Disease Father         CAD    Diabetes Father     High Blood Pressure Father     Hypertension Father     Dementia Brother 68        Review Of Systems    Skin: no abnormal pigmentation, rash, scaling, itching, masses, hair or nail changes  Eyes: negative  Ears/Nose/Throat: negative  Respiratory: negative  Cardiovascular: negative  Gastrointestinal: negative  Genitourinary: negative  Musculoskeletal: negative  Neurologic: negative  Psychiatric: negative  Hematologic/Lymphatic/Immunologic: negative  Endocrine: negative       Objective:         BP Readings from Last 3 Encounters:   05/14/21 124/72   03/18/21 120/68   01/20/21 112/72     /72   Pulse 63   Temp 97.3 °F (36.3 °C)   Resp 17   Ht 5' 7.72\" (1.72 m)   Wt 205 lb 9.6 oz (93.3 kg)   SpO2 96%   BMI 31.52 kg/m²   General appearance - healthy, alert, no distress  Skin - Skin color, texture, turgor normal. No rashes or lesions. Head - Normocephalic. No masses, lesions, tenderness or abnormalities  Eyes - conjunctivae/corneas clear. PERRLA, EOM's intact. Ears - External ears normal. Canals clear. TM's normal.  Nose/Sinuses - Nares normal. Septum midline. Mucosa normal. No drainage or sinus tenderness. Oropharynx - Lips, mucosa, and tongue normal. Teeth and gums normal. Oropharynx  clear  Neck - Neck supple. No adenopathy. Thyroid symmetric, normal size,  Back - Back symmetric, no curvature. ROM normal. No CVA tenderness. Lungs - Percussion normal. Good diaphragmatic excursion. Lungs clear  Heart - Regular rate and rhythm, with no rub, murmur or gallop noted. No edema. Abdomen - Abdomen soft, non-tender.  BS normal. No masses, organomegaly  Extremities - Extremities normal. No deformities, edema, or skin discoloration. Musculoskeletal - Spine ROM normal. Muscular strength intact. Peripheral pulses - radial=4/4,, femoral=4/4, popliteal=4/4, dorsalis pedis=4/4,  Neuro - Gait normal. Reflexes normal and symmetric. Sensation grossly normal.  No focal weakness    EKG: Done 3/9/21 at Cardiologist's office - Dr. Hallie Flores - sinus bradycardia. Assessment:        Per encounter diagnoses  He is medically cleared for surgery and anesthesia. Avoid Aspirin, non steroidal anti inflammatory medications, including Motrin, Aleve, Ibuprofen, Advil; multi vitamins, Vitamin E, omega 3 fish oil, and glucosamine chondroitin for the 7 days prior to surgery. 1. Preop examination  - Medically cleared . - continue Metoprolol XL 25 mg qd. - CBC , complete metabolic panel     2. Spinal stenosis of lumbar region, unspecified whether neurogenic claudication present/ 3. DDD (degenerative disc disease), lumbar  - Scheduled for surgery     4. Coronary artery disease involving native coronary artery of native heart without angina pectoris  - stable. Followed by cardiologist,Dr. Hallie Flores.   - Cardiac clearance per cardiologist.     5. Essential hypertension  - Controlled. Hold Lisinopril the night before surgery. - continue Metoprolol XL 25 mg qd . - complete metabolic panel     6. Hypercholesterolemia  - Controlled. Continue Lipitor 20 mg qd and low cholesterol diet. - complete metabolic panel     7. Type 2 diabetes mellitus without complication, without long-term current use of insulin ([facility])  - HgbA1c= 6.4 in 1/21. Continue dietary/ lifestyle modifications, including decreased concentrated sweets and carbohydrates. - complete metabolic panel     8. Degeneration of cervical intervertebral disc/ 9. Spinal stenosis in cervical region  - stable. 10. Gastroesophageal reflux disease without esophagitis  - stable.  Continue Axid bid and dietary/ lifestyle modifications. 11. Dysthymic disorder  - Stable. Continue Zoloft 50 mg qd. Plan:        Per operating surgeon. See also orders filed with this encounter, if any. Follow up 6 months/ prn. ADDENDUM 5/16/21: Labs reviewed. Medically cleared. Cardiac clearance per cardiologist, Dr. Brant Tavarez

## 2021-05-15 LAB
ESTIMATED AVERAGE GLUCOSE: 139.9 MG/DL
HBA1C MFR BLD: 6.5 %

## 2021-05-17 LAB
APTT: 28.6 SEC (ref 24.2–36.2)
INR BLD: 0.97 (ref 0.86–1.14)
PROTHROMBIN TIME: 11.3 SEC (ref 10–13.2)

## 2021-06-18 DIAGNOSIS — I10 ESSENTIAL HYPERTENSION: ICD-10-CM

## 2021-06-18 DIAGNOSIS — Z01.818 PRE-OP EXAM: ICD-10-CM

## 2021-06-18 RX ORDER — METOPROLOL SUCCINATE 25 MG/1
25 TABLET, EXTENDED RELEASE ORAL DAILY
Qty: 90 TABLET | Refills: 1 | Status: SHIPPED | OUTPATIENT
Start: 2021-06-18 | End: 2021-12-15

## 2021-07-13 ENCOUNTER — OFFICE VISIT (OUTPATIENT)
Dept: FAMILY MEDICINE CLINIC | Age: 71
End: 2021-07-13
Payer: MEDICARE

## 2021-07-13 ENCOUNTER — HOSPITAL ENCOUNTER (OUTPATIENT)
Dept: VASCULAR LAB | Age: 71
Discharge: HOME | End: 2021-07-13
Payer: MEDICARE

## 2021-07-13 VITALS
OXYGEN SATURATION: 99 % | SYSTOLIC BLOOD PRESSURE: 138 MMHG | TEMPERATURE: 97.5 F | HEART RATE: 59 BPM | WEIGHT: 218 LBS | RESPIRATION RATE: 21 BRPM | BODY MASS INDEX: 33.42 KG/M2 | DIASTOLIC BLOOD PRESSURE: 74 MMHG

## 2021-07-13 DIAGNOSIS — M79.89 RIGHT LEG SWELLING: Primary | ICD-10-CM

## 2021-07-13 DIAGNOSIS — S30.0XXA CONTUSION OF BUTTOCK, INITIAL ENCOUNTER: ICD-10-CM

## 2021-07-13 DIAGNOSIS — M79.604 RIGHT LEG PAIN: ICD-10-CM

## 2021-07-13 PROCEDURE — G8417 CALC BMI ABV UP PARAM F/U: HCPCS | Performed by: FAMILY MEDICINE

## 2021-07-13 PROCEDURE — 99214 OFFICE O/P EST MOD 30 MIN: CPT | Performed by: FAMILY MEDICINE

## 2021-07-13 PROCEDURE — 93971 EXTREMITY STUDY: CPT

## 2021-07-13 PROCEDURE — 1036F TOBACCO NON-USER: CPT | Performed by: FAMILY MEDICINE

## 2021-07-13 PROCEDURE — 3017F COLORECTAL CA SCREEN DOC REV: CPT | Performed by: FAMILY MEDICINE

## 2021-07-13 PROCEDURE — 1123F ACP DISCUSS/DSCN MKR DOCD: CPT | Performed by: FAMILY MEDICINE

## 2021-07-13 PROCEDURE — G8427 DOCREV CUR MEDS BY ELIG CLIN: HCPCS | Performed by: FAMILY MEDICINE

## 2021-07-13 PROCEDURE — 4040F PNEUMOC VAC/ADMIN/RCVD: CPT | Performed by: FAMILY MEDICINE

## 2021-07-13 RX ORDER — ASPIRIN 81 MG/1
81 TABLET ORAL DAILY
COMMUNITY

## 2021-07-13 SDOH — ECONOMIC STABILITY: FOOD INSECURITY: WITHIN THE PAST 12 MONTHS, THE FOOD YOU BOUGHT JUST DIDN'T LAST AND YOU DIDN'T HAVE MONEY TO GET MORE.: NEVER TRUE

## 2021-07-13 SDOH — ECONOMIC STABILITY: FOOD INSECURITY: WITHIN THE PAST 12 MONTHS, YOU WORRIED THAT YOUR FOOD WOULD RUN OUT BEFORE YOU GOT MONEY TO BUY MORE.: NEVER TRUE

## 2021-07-13 ASSESSMENT — SOCIAL DETERMINANTS OF HEALTH (SDOH): HOW HARD IS IT FOR YOU TO PAY FOR THE VERY BASICS LIKE FOOD, HOUSING, MEDICAL CARE, AND HEATING?: NOT HARD AT ALL
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
## 2021-08-05 ENCOUNTER — HOSPITAL ENCOUNTER (OUTPATIENT)
Dept: PHYSICAL THERAPY | Age: 71
Setting detail: THERAPIES SERIES
Discharge: HOME | End: 2021-08-05
Payer: MEDICARE

## 2021-08-05 PROCEDURE — 97161 PT EVAL LOW COMPLEX 20 MIN: CPT | Performed by: PHYSICAL THERAPIST

## 2021-08-05 PROCEDURE — 97112 NEUROMUSCULAR REEDUCATION: CPT | Performed by: PHYSICAL THERAPIST

## 2021-08-05 PROCEDURE — 97110 THERAPEUTIC EXERCISES: CPT | Performed by: PHYSICAL THERAPIST
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
## 2021-08-05 NOTE — FLOWSHEET NOTE
The [address] [place] [address] [facility]    Physical Therapy Daily Treatment Note  Date:  2021    Patient Name:  Yoselyn Shelton    :  1950  MRN: [id number]  Restrictions/Precautions:    Medical/Treatment Diagnosis Information:  · Diagnosis: ICD-722.10, ICD-M51.16, ICD-721.3, [id number]  · Treatment Diagnosis: R53.1; muscle tightness, joint stiffness  Insurance/Certification information:  PT Insurance Information: MEDICARE  Physician Information:  Referring Practitioner: Lew Walters MD  Has the plan of care been signed (Y/N):        []  Yes  [x]  No     Date of Patient follow up with Physician: end of Aug      Is this a Progress Report:     []  Yes  [x]  No        If Yes:  Date Range for reporting period:  Beginning  Ending    Progress report will be due (10 Rx or 30 days whichever is less): 53      Recertification will be due (POC Duration  / 90 days whichever is less): 21        Visit # Insurance Allowable Auth Required   1 MED [facility] []  Yes [x]  No        Functional Scale:    Date assessed:  JENNIFER =16% deficit   21     Latex Allergy:  [x]NO      []YES  Preferred Language for Healthcare:   [x]English       []other:      Pain level:  0/10     SUBJECTIVE:  See eval    OBJECTIVE:   ROM   Comments   Trunk flexion Finger tips 11 inches from ground     Trunk extension 1.5 inches, hinge at lumbar spine     Trunk R sidebend Finger tops just above joint line P!  Through L QL   Trunk L sidebend Finger tips to joint line     Trunk R rotation 43 deg     Trunk L rotation 47 deg     HS flexibility R SLR 58 deg  L SLR 65 deg                        Strength Left Right Comments   Hip flexion(L2) 4+/5 4+/5     Knee extension(L3) 4+/5 4+/5     Knee flexion(S1-2) 4+/5 4+/5     Ankle dorsiflexion(L4) 4+/5 4+/5     Toe extension(L5) 4+/5 4+/5     Ankle eversion/plantar flexion(S1) 4+/5 4+/5     Hip abd   3+/5 hip extended  4/5 neutral hip 3+/5 hip extended  4/5 neutral hip        Special tests   Comments   SLR + for HS tightness     Slump test Not indicated     Pelvic symmetry  Not assessed     Segmental Spinal mobility hypomobile     Heel walk Not assessed     Toe walk Not assessed     Tandem walk Not assessed      SHAYAN  R knee 7 inches from table  L knee 6 inches from table        Joint mobility:               [x]? Normal               []? Hypo              []? Hyper     Palpation: TTP R glute     Functional Mobility/Transfers: Independent     Posture: WNL     Bandages/Dressings/Incisions:   8/5/21 surgical incision well healed     Gait: wide THANIA, B out toeing    RESTRICTIONS/PRECAUTIONS:     Exercises/Interventions:   ROM/stretches     SKTC 2x30\" B    Supine figure 4 2x30\" B    Prone quad 2x30\" B, with strap    Child pose 3x30\"    Child pose with QL bias 2x30\" B    Seated HS HEP              Strengthening     TA brace 10x10\" hooklying    glute sets 10x10\" supine                                                Manual Intervention             Prone PA      GISTM/STM      Lumbar Manip      SI Manip      Hip belt mobs      Hip LA distraction              Plan for next session: clamshells, bridging    Therapeutic Exercise and NMR EXR  [x] (39034) Provided verbal/tactile cueing for activities related to strengthening, flexibility, endurance, ROM  for improvements in proximal hip and core control with self care, mobility, lifting and ambulation.  [] (31741) Provided verbal/tactile cueing for activities related to improving balance, coordination, kinesthetic sense, posture, motor skill, proprioception  to assist with core control in self care, mobility, lifting, and ambulation.      Therapeutic Activities:    [] (50561 or 29271) Provided verbal/tactile cueing for activities related to improving balance, coordination, kinesthetic sense, posture, motor skill, proprioception and motor activation to allow for proper function  with self care and ADLs  [] (29754) Provided training and instruction to the patient for proper core and proximal hip recruitment and positioning with ambulation re-education     Home Exercise Program:    [x] (70166) Reviewed/Progressed HEP activities related to strengthening, flexibility, endurance, ROM of core, proximal hip and LE for functional self-care, mobility, lifting and ambulation   [] (50270) Reviewed/Progressed HEP activities related to improving balance, coordination, kinesthetic sense, posture, motor skill, proprioception of core, proximal hip and LE for self care, mobility, lifting, and ambulation      Manual Treatments:    [] (07003) Provided manual therapy to mobilize proximal hip and LS spine soft tissue/joints for the purpose of modulating pain, promoting relaxation,  increasing ROM, reducing/eliminating soft tissue swelling/inflammation/restriction, improving soft tissue extensibility and allowing for proper ROM for normal function with self care, mobility, lifting and ambulation. Modalities:   None    Charges:  Timed Code Treatment Minutes: 23   Total Treatment Minutes: 45       [x] EVAL (LOW) 80490 (typically 20 minutes face-to-face)  [] EVAL (MOD) 81790 (typically 30 minutes face-to-face)  [] EVAL (HIGH) 71506 (typically 45 minutes face-to-face)  [] RE-EVAL     [x] JW(40723) x 1     [] IONTO  [x] NMR (74683) x 1    [] VASO  [] Manual (42010) x      [] Other:  [] TA x      [] Mech Traction (26757)  [] ES(attended) (72125)      [] ES (un) (71611):     Goals:   Access Code: [email or web address]  URL: [email or web address]  Date: 08/05/2021  Prepared by: Jamaica Lopez     Exercises  · Prone Quadriceps Stretch with Strap - 1 x daily - 7 x weekly - 1 sets - 3-5 reps - 30 second hold  · Supine Figure 4 Piriformis Stretch - 1 x daily - 7 x weekly - 1 sets - 3-5 reps - 30 second hold  · Supine Single Knee to Chest Stretch - 1 x daily - 7 x weekly - 1 sets - 3-5 reps - 30 second hold  · Seated Table Hamstring Stretch - 1 x daily - 7 x weekly - 1 sets - 3-5 reps - 30 second hold  · Child's Pose with Sidebending - 1 x daily - 7 x weekly - 1 sets - 3-5 reps - 30 second hold  · Child's Pose Stretch - 1 x daily - 7 x weekly - 1 sets - 3-5 reps - 30 second hold  · Supine Transversus Abdominis Bracing - Hands on Stomach - 1 x daily - 7 x weekly - 1 sets - 10 reps - 10 second hold  · Supine Gluteal Sets - 1 x daily - 7 x weekly - 1 sets - 10 reps - 10 second hold    Overall Progression Towards Functional goals/ Treatment Progress Update:  [] Patient is progressing as expected towards functional goals listed. [] Progression is slowed due to complexities/Impairments listed. [] Progression has been slowed due to co-morbidities. [x] Plan just implemented, too soon to assess goals progression <30days   [] Goals require adjustment due to lack of progress  [] Patient is not progressing as expected and requires additional follow up with physician  [] Other    Prognosis for POC: [x] Good [] Fair  [] Poor      Patient requires continued skilled intervention: [x] Yes  [] No    Treatment/Activity Tolerance:  [x] Patient able to complete treatment  [] Patient limited by fatigue  [] Patient limited by pain     [] Patient limited by other medical complications  [] Other:     Patient education:  8/5/21 Pt educated on diagnosis, prognosis and PT plan of care. Educated on [employer]. Pt questions were addressed and answered. Prognosis: [x] Good [] Fair  [] Poor    Patient Requires Follow-up: [x] Yes  [] No    PLAN: See eval  [] Continue per plan of care [] Alter current plan (see comments)  [x] Plan of care initiated [] Hold pending MD visit [] Discharge    Electronically signed by: Farhat Wild, PT DPT, OCS  Physical Therapist  [id number]  [email or web address]      *If patient does not return for further follow ups after this date. Please consider this as the patients discharge from physical therapy.

## 2021-08-11 ENCOUNTER — HOSPITAL ENCOUNTER (OUTPATIENT)
Dept: PHYSICAL THERAPY | Age: 71
Setting detail: THERAPIES SERIES
Discharge: HOME | End: 2021-08-11
Payer: MEDICARE

## 2021-08-11 PROCEDURE — 97140 MANUAL THERAPY 1/> REGIONS: CPT | Performed by: PHYSICAL THERAPIST

## 2021-08-11 PROCEDURE — 97110 THERAPEUTIC EXERCISES: CPT | Performed by: PHYSICAL THERAPIST

## 2021-08-11 NOTE — FLOWSHEET NOTE
The [address] [place] [address] and Sports [facility]    Physical Therapy Daily Treatment Note  Date:  2021    Patient Name:  Trini Jacobson    :  1950  MRN: [id number]  Restrictions/Precautions:    Medical/Treatment Diagnosis Information:  · Diagnosis: ICD-722.10, ICD-M51.16, ICD-721.3, HBH10-R22.816  · Treatment Diagnosis: R53.1; muscle tightness, joint stiffness  Insurance/Certification information:  PT Insurance Information: MEDICARE  Physician Information:  Referring Practitioner: Juan R Martinez MD  Has the plan of care been signed (Y/N):        []  Yes  [x]  No     Date of Patient follow up with Physician: end of Aug      Is this a Progress Report:     []  Yes  [x]  No        If Yes:  Date Range for reporting period:  Beginning  Ending    Progress report will be due (10 Rx or 30 days whichever is less): 63      Recertification will be due (POC Duration  / 90 days whichever is less): 21        Visit # Insurance Allowable Auth Required   2 MED NEC []  Yes [x]  No        Functional Scale:    Date assessed:  JENNIFER =16% deficit   21     Latex Allergy:  [x]NO      []YES  Preferred Language for Healthcare:   [x]English       []other:      Pain level:  0/10     SUBJECTIVE:  Pt states he is feeling a little looser. had a massage last Friday and states that he felt really good afterwards. OBJECTIVE:   ROM   Comments   Trunk flexion Finger tips 11 inches from ground     Trunk extension 1.5 inches, hinge at lumbar spine     Trunk R sidebend Finger tops just above joint line P!  Through L QL   Trunk L sidebend Finger tips to joint line     Trunk R rotation 43 deg     Trunk L rotation 47 deg     HS flexibility R SLR 58 deg  L SLR 65 deg                        Strength Left Right Comments   Hip flexion(L2) 4+/5 4+/5     Knee extension(L3) 4+/5 4+/5     Knee flexion(S1-2) 4+/5 4+/5     Ankle dorsiflexion(L4) 4+/5 4+/5     Toe extension(L5) 4+/5 4+/5     Ankle eversion/plantar flexion(S1) 4+/5 4+/5     Hip abd   3+/5 hip extended  4/5 neutral hip 3+/5 hip extended  4/5 neutral hip        Special tests   Comments   SLR + for HS tightness     Slump test Not indicated     Pelvic symmetry  Not assessed     Segmental Spinal mobility hypomobile     Heel walk Not assessed     Toe walk Not assessed     Tandem walk Not assessed      SHAYAN  R knee 7 inches from table  L knee 6 inches from table        Joint mobility:               [x]? Normal               []? Hypo              []? Hyper     Palpation: TTP R glute     Functional Mobility/Transfers: Independent     Posture: WNL     Bandages/Dressings/Incisions:   8/5/21 surgical incision well healed     Gait: wide THANIA, B out toeing    RESTRICTIONS/PRECAUTIONS:     Exercises/Interventions:   ROM/stretches     SKTC 2x30\" B    Supine figure 4 2x30\" B    Prone quad 2x30\" B, with strap    Child pose     Child pose with QL bias 2x30\" B    Seated HS HEP              Strengthening     TA brace 10x10\" hooklying    glute sets 10x10\" supine         Bridges 3x10 DL Foam roll between knees   Clamshells 2x10 hips flexed, B  2x10 hips neutral, B         Deadbug 3x10 B, heel tap only                       Manual Intervention             Prone PA      GISTM/STM  8' B QL  HG9: brushing   Lumbar Manip      SI Manip      Hip belt mobs      Hip LA distraction              Plan for next session: progress as tolerated     Therapeutic Exercise and NMR EXR  [x] (10162) Provided verbal/tactile cueing for activities related to strengthening, flexibility, endurance, ROM  for improvements in proximal hip and core control with self care, mobility, lifting and ambulation.  [] (13888) Provided verbal/tactile cueing for activities related to improving balance, coordination, kinesthetic sense, posture, motor skill, proprioception  to assist with core control in self care, mobility, lifting, and ambulation.      Therapeutic Activities:    [] (25530 or 95258) Provided verbal/tactile cueing for activities related to improving balance, coordination, kinesthetic sense, posture, motor skill, proprioception and motor activation to allow for proper function  with self care and ADLs  [] (90627) Provided training and instruction to the patient for proper core and proximal hip recruitment and positioning with ambulation re-education     Home Exercise Program:    [x] (36492) Reviewed/Progressed HEP activities related to strengthening, flexibility, endurance, ROM of core, proximal hip and LE for functional self-care, mobility, lifting and ambulation   [] (21165) Reviewed/Progressed HEP activities related to improving balance, coordination, kinesthetic sense, posture, motor skill, proprioception of core, proximal hip and LE for self care, mobility, lifting, and ambulation      Manual Treatments:    [] (17080) Provided manual therapy to mobilize proximal hip and LS spine soft tissue/joints for the purpose of modulating pain, promoting relaxation,  increasing ROM, reducing/eliminating soft tissue swelling/inflammation/restriction, improving soft tissue extensibility and allowing for proper ROM for normal function with self care, mobility, lifting and ambulation. Modalities:   None    Charges:  Timed Code Treatment Minutes: 25   Total Treatment Minutes: 25   Time in: 7:03  Time out: 8:11    [] EVAL (LOW) 69276 (typically 20 minutes face-to-face)  [] EVAL (MOD) 94827 (typically 30 minutes face-to-face)  [] EVAL (HIGH) 53271 (typically 45 minutes face-to-face)  [] RE-EVAL     [x] GE(61182) x 1     [] IONTO  [] NMR (03514) x     [] VASO  [x] Manual (49002) x 1     [] Other:  [] TA x      [] Mech Traction (34027)  [] ES(attended) (26543)      [] ES (un) (73586):     Goals:   Access Code: [email or web address]  URL: [email or web address]  Date: 08/11/2021  Prepared by: Timo Richardson     Exercises  · Prone Quadriceps Stretch with Strap - 1 x daily - 7 x weekly - 1 sets - 3-5 reps - 30 second hold  · Supine Figure 4 Piriformis Stretch - 1 x daily - 7 x weekly - 1 sets - 3-5 reps - 30 second hold  · Supine Single Knee to Chest Stretch - 1 x daily - 7 x weekly - 1 sets - 3-5 reps - 30 second hold  · Seated Table Hamstring Stretch - 1 x daily - 7 x weekly - 1 sets - 3-5 reps - 30 second hold  · Child's Pose with Sidebending - 1 x daily - 7 x weekly - 1 sets - 3-5 reps - 30 second hold  · Child's Pose Stretch - 1 x daily - 7 x weekly - 1 sets - 3-5 reps - 30 second hold  · Supine Transversus Abdominis Bracing - Hands on Stomach - 1 x daily - 7 x weekly - 1 sets - 10 reps - 10 second hold  · Supine Gluteal Sets - 1 x daily - 7 x weekly - 1 sets - 10 reps - 10 second hold  · Supine Bridge with Mini Swiss Ball Between Knees - 1 x daily - 7 x weekly - 3 sets - 10 reps  · Supine March - 1 x daily - 7 x weekly - 3 sets - 10 reps  · Clamshell - 1 x daily - 7 x weekly - 3 sets - 10 reps  · Sidelying Clamshell in Neutral - 1 x daily - 7 x weekly - 3 sets - 10 reps    Overall Progression Towards Functional goals/ Treatment Progress Update:  [] Patient is progressing as expected towards functional goals listed. [] Progression is slowed due to complexities/Impairments listed. [] Progression has been slowed due to co-morbidities.   [x] Plan just implemented, too soon to assess goals progression <30days   [] Goals require adjustment due to lack of progress  [] Patient is not progressing as expected and requires additional follow up with physician  [] Other    Prognosis for POC: [x] Good [] Fair  [] Poor      Patient requires continued skilled intervention: [x] Yes  [] No    Treatment/Activity Tolerance:  [x] Patient able to complete treatment  [] Patient limited by fatigue  [] Patient limited by pain     [] Patient limited by other medical complications  [] Other:  Pt notes pain in low back during prone stretching, placed pillows under hips and educated on proper arm positions to avoid promoting and extension based posture. Pt tolerates stretching well. Pt tolerated exercise progress well and without pain. Pt exhibits tightness through QL, tolerated IASTM well, moderate erythremic response. Pt requires PT follow up to address flexibility, strength and functional mobility deficits. Patient education:  8/5/21 Pt educated on diagnosis, prognosis and PT plan of care. Educated on [address]. Pt questions were addressed and answered. Prognosis: [x] Good [] Fair  [] Poor    Patient Requires Follow-up: [x] Yes  [] No    PLAN: See eval  [] Continue per plan of care [] Alter current plan (see comments)  [x] Plan of care initiated [] Hold pending MD visit [] Discharge    Electronically signed by: Elvin Durán, PT DPT, OCS  Physical Therapist  [id number]  [email or web address]      *If patient does not return for further follow ups after this date. Please consider this as the patients discharge from physical therapy.

## 2021-08-18 ENCOUNTER — HOSPITAL ENCOUNTER (OUTPATIENT)
Dept: PHYSICAL THERAPY | Age: 71
Setting detail: THERAPIES SERIES
Discharge: HOME | End: 2021-08-18
Payer: MEDICARE

## 2021-08-18 PROCEDURE — 97140 MANUAL THERAPY 1/> REGIONS: CPT | Performed by: PHYSICAL THERAPIST

## 2021-08-18 PROCEDURE — 97530 THERAPEUTIC ACTIVITIES: CPT | Performed by: PHYSICAL THERAPIST

## 2021-08-18 PROCEDURE — 97110 THERAPEUTIC EXERCISES: CPT | Performed by: PHYSICAL THERAPIST

## 2021-08-18 NOTE — FLOWSHEET NOTE
The [address] [place] [address] [facility]    Physical Therapy Daily Treatment Note  Date:  2021    Patient Name:  Nancy Mares    :  1950  MRN: [id number]  Restrictions/Precautions:    Medical/Treatment Diagnosis Information:  · Diagnosis: ICD-722.10, ICD-M51.16, ICD-721.3, [id number]  · Treatment Diagnosis: R53.1; muscle tightness, joint stiffness  Insurance/Certification information:  PT Insurance Information: MEDICARE  Physician Information:  Referring Practitioner: Lesa Low MD  Has the plan of care been signed (Y/N):        []  Yes  [x]  No     Date of Patient follow up with Physician: end of Aug      Is this a Progress Report:     []  Yes  [x]  No        If Yes:  Date Range for reporting period:  Beginning  Ending    Progress report will be due (10 Rx or 30 days whichever is less): 69      Recertification will be due (POC Duration  / 90 days whichever is less): 21        Visit # Insurance Allowable Auth Required   3 MED NEC []  Yes [x]  No        Functional Scale:    Date assessed:  JENNIFER =16% deficit   21     Latex Allergy:  [x]NO      []YES  Preferred Language for Healthcare:   [x]English       []other:      Pain level:  0/10     SUBJECTIVE:  Pt states this back feels a little better, not constantly aware of tightness. Pt states that he has been walking 5 miles/day. OBJECTIVE:   ROM   Comments   Trunk flexion Finger tips 11 inches from ground     Trunk extension 1.5 inches, hinge at lumbar spine     Trunk R sidebend Finger tops just above joint line P!  Through L QL   Trunk L sidebend Finger tips to joint line     Trunk R rotation 43 deg     Trunk L rotation 47 deg     HS flexibility R SLR 58 deg  L SLR 65 deg                        Strength Left Right Comments   Hip flexion(L2) 4+/5 4+/5     Knee extension(L3) 4+/5 4+/5     Knee flexion(S1-2) 4+/5 4+/5     Ankle dorsiflexion(L4) 4+/5 4+/5     Toe extension(L5) 4+/5 4+/5     Ankle Therapeutic Activities:    [] (77368 or 27998) Provided verbal/tactile cueing for activities related to improving balance, coordination, kinesthetic sense, posture, motor skill, proprioception and motor activation to allow for proper function  with self care and ADLs  [] (52624) Provided training and instruction to the patient for proper core and proximal hip recruitment and positioning with ambulation re-education     Home Exercise Program:    [x] (27069) Reviewed/Progressed HEP activities related to strengthening, flexibility, endurance, ROM of core, proximal hip and LE for functional self-care, mobility, lifting and ambulation   [] (44550) Reviewed/Progressed HEP activities related to improving balance, coordination, kinesthetic sense, posture, motor skill, proprioception of core, proximal hip and LE for self care, mobility, lifting, and ambulation      Manual Treatments:    [] (32662) Provided manual therapy to mobilize proximal hip and LS spine soft tissue/joints for the purpose of modulating pain, promoting relaxation,  increasing ROM, reducing/eliminating soft tissue swelling/inflammation/restriction, improving soft tissue extensibility and allowing for proper ROM for normal function with self care, mobility, lifting and ambulation. Modalities:   None    Charges:  Timed Code Treatment Minutes: 45   Total Treatment Minutes: 45   Time in: 8:35  Time out: 9:20    [] EVAL (LOW) 79034 (typically 20 minutes face-to-face)  [] EVAL (MOD) 64147 (typically 30 minutes face-to-face)  [] EVAL (HIGH) 22892 (typically 45 minutes face-to-face)  [] RE-EVAL     [x] DJ(43393) x 1     [] IONTO  [] NMR (97062) x     [] VASO  [x] Manual (81943) x 1     [] Other:  [x] TA x 1     [] Mech Traction (89227)  [] ES(attended) (14179)      [] ES (un) (30431):     Goals:   Access Code: [email or web address]  URL: [email or web address]  Date: 08/11/2021  Prepared by: Yelena Waterman     Exercises  · Prone Quadriceps Stretch with Strap - 1 x daily - 7 x weekly - 1 sets - 3-5 reps - 30 second hold  · Supine Figure 4 Piriformis Stretch - 1 x daily - 7 x weekly - 1 sets - 3-5 reps - 30 second hold  · Supine Single Knee to Chest Stretch - 1 x daily - 7 x weekly - 1 sets - 3-5 reps - 30 second hold  · Seated Table Hamstring Stretch - 1 x daily - 7 x weekly - 1 sets - 3-5 reps - 30 second hold  · Child's Pose with Sidebending - 1 x daily - 7 x weekly - 1 sets - 3-5 reps - 30 second hold  · Child's Pose Stretch - 1 x daily - 7 x weekly - 1 sets - 3-5 reps - 30 second hold  · Supine Transversus Abdominis Bracing - Hands on Stomach - 1 x daily - 7 x weekly - 1 sets - 10 reps - 10 second hold  · Supine Gluteal Sets - 1 x daily - 7 x weekly - 1 sets - 10 reps - 10 second hold  · Supine Bridge with Mini Swiss Ball Between Knees - 1 x daily - 7 x weekly - 3 sets - 10 reps  · Supine March - 1 x daily - 7 x weekly - 3 sets - 10 reps  · Clamshell - 1 x daily - 7 x weekly - 3 sets - 10 reps  · Sidelying Clamshell in Neutral - 1 x daily - 7 x weekly - 3 sets - 10 reps    Overall Progression Towards Functional goals/ Treatment Progress Update:  [] Patient is progressing as expected towards functional goals listed. [] Progression is slowed due to complexities/Impairments listed. [] Progression has been slowed due to co-morbidities. [x] Plan just implemented, too soon to assess goals progression <30days   [] Goals require adjustment due to lack of progress  [] Patient is not progressing as expected and requires additional follow up with physician  [] Other    Prognosis for POC: [x] Good [] Fair  [] Poor      Patient requires continued skilled intervention: [x] Yes  [] No    Treatment/Activity Tolerance:  [x] Patient able to complete treatment  [] Patient limited by fatigue  [] Patient limited by pain     [] Patient limited by other medical complications  [] Other: Pt continues to have moderate erythremic response at QL with IASTM.  Pt challenged by sidelying flexion, required verbal and tactile cues to keep knee and foot in line with hip rather than letting knee drop as hip came into flexion- pt reported fatigue. Pt had mild sway and + use of hip strategy to maintain balance in 1/2 kneel tandem. Pt reports feeling good at end of session. Pt requires PT follow up to address flexibility, strength and functional mobility deficits. Patient education:  8/5/21 Pt educated on diagnosis, prognosis and PT plan of care. Educated on [address]. Pt questions were addressed and answered. Prognosis: [x] Good [] Fair  [] Poor    Patient Requires Follow-up: [x] Yes  [] No    PLAN: See eval  [] Continue per plan of care [] Alter current plan (see comments)  [x] Plan of care initiated [] Hold pending MD visit [] Discharge    Electronically signed by: Latosha Hackett, PT DPT, OCS  Physical Therapist  [id number]  [email or web address]      *If patient does not return for further follow ups after this date. Please consider this as the patients discharge from physical therapy.

## 2021-08-24 ENCOUNTER — APPOINTMENT (OUTPATIENT)
Dept: PHYSICAL THERAPY | Age: 71
End: 2021-08-24
Payer: MEDICARE

## 2021-08-26 ENCOUNTER — HOSPITAL ENCOUNTER (OUTPATIENT)
Dept: PHYSICAL THERAPY | Age: 71
Setting detail: THERAPIES SERIES
Discharge: HOME | End: 2021-08-26
Payer: MEDICARE

## 2021-08-26 PROCEDURE — 97112 NEUROMUSCULAR REEDUCATION: CPT | Performed by: PHYSICAL THERAPIST

## 2021-08-26 PROCEDURE — 97110 THERAPEUTIC EXERCISES: CPT | Performed by: PHYSICAL THERAPIST

## 2021-08-26 PROCEDURE — 97530 THERAPEUTIC ACTIVITIES: CPT | Performed by: PHYSICAL THERAPIST

## 2021-08-26 NOTE — FLOWSHEET NOTE
The [address] [place] [address] [facility]    Physical Therapy Daily Treatment Note  Date:  2021    Patient Name:  Kelly Fontenot    :  1950  MRN: [id number]  Restrictions/Precautions:    Medical/Treatment Diagnosis Information:  · Diagnosis: ICD-722.10, ICD-M51.16, ICD-721.3, [id number]  · Treatment Diagnosis: R53.1; muscle tightness, joint stiffness  Insurance/Certification information:  PT Insurance Information: MEDICARE  Physician Information:  Referring Practitioner: Aura Herrera MD  Has the plan of care been signed (Y/N):        []  Yes  [x]  No     Date of Patient follow up with Physician: end of Aug      Is this a Progress Report:     []  Yes  [x]  No        If Yes:  Date Range for reporting period:  Beginning  Ending    Progress report will be due (10 Rx or 30 days whichever is less): 06      Recertification will be due (POC Duration  / 90 days whichever is less): 21        Visit # Insurance Allowable Auth Required   3 MED NEC []  Yes [x]  No        Functional Scale:    Date assessed:  JENNIFER =16% deficit   21     Latex Allergy:  [x]NO      []YES  Preferred Language for Healthcare:   [x]English       []other:      Pain level:  0/10     SUBJECTIVE:  Pt states his back has been feeling better since LPV. He is very Hindu about doing HEP in the morning and walking 5 miles, not as good about getting things done at night. Pt states that he is having less tightness. Pt states that prone quad stretch does bother his back a little bit, one that he likes the least. Pt has f/u with surgeon tomorrow    OBJECTIVE:   ROM   Comments   Trunk flexion Finger tips 11 inches from ground     Trunk extension 1.5 inches, hinge at lumbar spine     Trunk R sidebend Finger tops just above joint line P!  Through L QL   Trunk L sidebend Finger tips to joint line     Trunk R rotation 43 deg     Trunk L rotation 47 deg     HS flexibility R SLR 58 deg  L SLR 65 deg                      Strength Left Right Comments   Hip flexion(L2) 4+/5 4+/5     Knee extension(L3) 4+/5 4+/5     Knee flexion(S1-2) 4+/5 4+/5     Ankle dorsiflexion(L4) 4+/5 4+/5     Toe extension(L5) 4+/5 4+/5     Ankle eversion/plantar flexion(S1) 4+/5 4+/5     Hip abd   3+/5 hip extended  4/5 neutral hip 3+/5 hip extended  4/5 neutral hip        Special tests   Comments   SLR + for HS tightness     Slump test Not indicated     Pelvic symmetry  Not assessed     Segmental Spinal mobility hypomobile     Heel walk Not assessed     Toe walk Not assessed     Tandem walk Not assessed      SHAYAN  R knee 7 inches from table  L knee 6 inches from table        Joint mobility:               [x]? Normal               []? Hypo              []? Hyper     Palpation: TTP R glute     Functional Mobility/Transfers: Independent     Posture: WNL     Bandages/Dressings/Incisions:   8/5/21 surgical incision well healed     Gait: wide THANIA, B out toeing    RESTRICTIONS/PRECAUTIONS:     Exercises/Interventions:   ROM/stretches     SKTC    Supine figure 4    Prone quad  DC'd 8/26 d/t exacerbation of symptoms   Child pose 1x30\"    Child pose with QL bias     Seated HS HEP    Half kneel hip flexor 3x30\" Knee on airex   Standing quad stretch 3x30\"  Foot on chair        Strengthening     TA brace    glute sets        Bridges Foam roll between knees   Clamshells    IR clamshell 3x10 B Foam roll between knees, rotate to neutral    Sidelying hip flexion 3x5 B              Deadbug 3x10 B, heel tap only Increased hip ext during heel tap, arms held at 90 deg shoulder flex   Isometric deadbug 10x10\" B, done unilaterally         1/2 kneel stability 2x30\" B Opp knee and foot in tandem position        Side stepping 3 passes, red loop Slight trunk flexion   Paloff Press              Manual Intervention             Prone PA      GISTM/STM  Lumbar Manip      SI Manip      Hip belt mobs      Hip LA distraction              Plan for next session: progress as tolerated     Therapeutic Exercise and NMR EXR  [x] (65767) Provided verbal/tactile cueing for activities related to strengthening, flexibility, endurance, ROM  for improvements in proximal hip and core control with self care, mobility, lifting and ambulation.  [] (29491) Provided verbal/tactile cueing for activities related to improving balance, coordination, kinesthetic sense, posture, motor skill, proprioception  to assist with core control in self care, mobility, lifting, and ambulation. Therapeutic Activities:    [] (18931 or 03627) Provided verbal/tactile cueing for activities related to improving balance, coordination, kinesthetic sense, posture, motor skill, proprioception and motor activation to allow for proper function  with self care and ADLs  [] (64634) Provided training and instruction to the patient for proper core and proximal hip recruitment and positioning with ambulation re-education     Home Exercise Program:    [x] (99805) Reviewed/Progressed HEP activities related to strengthening, flexibility, endurance, ROM of core, proximal hip and LE for functional self-care, mobility, lifting and ambulation   [] (53865) Reviewed/Progressed HEP activities related to improving balance, coordination, kinesthetic sense, posture, motor skill, proprioception of core, proximal hip and LE for self care, mobility, lifting, and ambulation      Manual Treatments:    [] (90203) Provided manual therapy to mobilize proximal hip and LS spine soft tissue/joints for the purpose of modulating pain, promoting relaxation,  increasing ROM, reducing/eliminating soft tissue swelling/inflammation/restriction, improving soft tissue extensibility and allowing for proper ROM for normal function with self care, mobility, lifting and ambulation.      Modalities:   None    Charges:  Timed Code Treatment Minutes: 38   Total Treatment Minutes: 38   Time in: 8:10  Time out: 8:55    [] EVAL (LOW) 59411 (typically 20 minutes face-to-face)  [] EVAL (MOD) 02975 (typically 30 minutes face-to-face)  [] EVAL (HIGH) 67076 (typically 45 minutes face-to-face)  [] RE-EVAL     [x] SI(22118) x 1     [] IONTO  [x] NMR (55521) x 1    [] VASO  [] Manual (10863) x      [] Other:  [x] TA x 1     [] Mech Traction (37668)  [] ES(attended) (13868)      [] ES (un) (89682):     Goals:   Access Code: [email or web address]  URL: [email or web address]  Date: 08/11/2021  Prepared by: Jared Lyons     Exercises  · Prone Quadriceps Stretch with Strap - 1 x daily - 7 x weekly - 1 sets - 3-5 reps - 30 second hold  · Supine Figure 4 Piriformis Stretch - 1 x daily - 7 x weekly - 1 sets - 3-5 reps - 30 second hold  · Supine Single Knee to Chest Stretch - 1 x daily - 7 x weekly - 1 sets - 3-5 reps - 30 second hold  · Seated Table Hamstring Stretch - 1 x daily - 7 x weekly - 1 sets - 3-5 reps - 30 second hold  · Child's Pose with Sidebending - 1 x daily - 7 x weekly - 1 sets - 3-5 reps - 30 second hold  · Child's Pose Stretch - 1 x daily - 7 x weekly - 1 sets - 3-5 reps - 30 second hold  · Supine Transversus Abdominis Bracing - Hands on Stomach - 1 x daily - 7 x weekly - 1 sets - 10 reps - 10 second hold  · Supine Gluteal Sets - 1 x daily - 7 x weekly - 1 sets - 10 reps - 10 second hold  · Supine Bridge with Mini Swiss Ball Between Knees - 1 x daily - 7 x weekly - 3 sets - 10 reps  · Supine March - 1 x daily - 7 x weekly - 3 sets - 10 reps  · Clamshell - 1 x daily - 7 x weekly - 3 sets - 10 reps  · Sidelying Clamshell in Neutral - 1 x daily - 7 x weekly - 3 sets - 10 reps    Overall Progression Towards Functional goals/ Treatment Progress Update:  [] Patient is progressing as expected towards functional goals listed. [] Progression is slowed due to complexities/Impairments listed. [] Progression has been slowed due to co-morbidities.   [x] Plan just implemented, too soon to assess goals progression <30days   [] Goals require adjustment due to lack of progress  [] Patient is not progressing as expected and requires additional follow up with physician  [] Other    Prognosis for POC: [x] Good [] Fair  [] Poor      Patient requires continued skilled intervention: [x] Yes  [] No    Treatment/Activity Tolerance:  [x] Patient able to complete treatment  [] Patient limited by fatigue  [] Patient limited by pain     [] Patient limited by other medical complications  [] Other: Pt continues to require cueing during sidelying hip flexion to focus on glute squeeze and achieved neutral hip ext as he tends to maintain slight hip flexion. Pt reports increased glute fatigue with proper form. Pt tolerated progression of deadbug well as well as addition of isometric deadbug, felt good core activation and fatigue. Pt tolerated kneeling hip flexor stretch and standing quad with foot on chair well, able to complete without back pain that is felt during prone quad, did attempted standing quad with strap but that irritated back more than prone position. Pt requires PT follow up to address flexibility, strength and functional mobility deficits. Patient education:  8/5/21 Pt educated on diagnosis, prognosis and PT plan of care. Educated on [address] [place] [address]. Pt questions were addressed and answered. Prognosis: [x] Good [] Fair  [] Poor    Patient Requires Follow-up: [x] Yes  [] No    PLAN: See eval  [] Continue per plan of care [] Alter current plan (see comments)  [x] Plan of care initiated [] Hold pending MD visit [] Discharge    Electronically signed by: Latosha Hackett, PT DPT, OCS  Physical Therapist  [id number]  [email or web address]      *If patient does not return for further follow ups after this date. Please consider this as the patients discharge from physical therapy.

## 2021-09-01 ENCOUNTER — HOSPITAL ENCOUNTER (OUTPATIENT)
Dept: PHYSICAL THERAPY | Age: 71
Setting detail: THERAPIES SERIES
Discharge: HOME | End: 2021-09-01
Payer: MEDICARE

## 2021-09-01 PROCEDURE — 97530 THERAPEUTIC ACTIVITIES: CPT | Performed by: PHYSICAL THERAPIST

## 2021-09-01 PROCEDURE — 97112 NEUROMUSCULAR REEDUCATION: CPT | Performed by: PHYSICAL THERAPIST

## 2021-09-01 NOTE — FLOWSHEET NOTE
The [address] [place] [address] and [facility]    Physical Therapy Daily Treatment Note  Date:  2021    Patient Name:  Jessa Adame    :  1950  MRN: [id number]  Restrictions/Precautions:    Medical/Treatment Diagnosis Information:  · Diagnosis: ICD-722.10, ICD-M51.16, ICD-721.3, [id number]  · Treatment Diagnosis: R53.1; muscle tightness, joint stiffness  Insurance/Certification information:  PT Insurance Information: MEDICARE  Physician Information:  Referring Practitioner: Evelin Wood MD  Has the plan of care been signed (Y/N):        []  Yes  [x]  No     Date of Patient follow up with Physician: end of Aug      Is this a Progress Report:     []  Yes  [x]  No        If Yes:  Date Range for reporting period:  Beginning  Ending    Progress report will be due (10 Rx or 30 days whichever is less): 70      Recertification will be due (POC Duration  / 90 days whichever is less): 21        Visit # Insurance Allowable Auth Required   4 MED NEC []  Yes [x]  No        Functional Scale:    Date assessed:  JENNIFER =16% deficit   21     Latex Allergy:  [x]NO      []YES  Preferred Language for Healthcare:   [x]English       []other:      Pain level:  0/10     SUBJECTIVE:  Pt saw MD last week, said that he should not be having any stenotic symptoms at this time and what he is feeling is likely scar tissue. Pt can begin to progress back into normal activity but just to progress slowly and be careful with lifting. MD till him that he can job, but should run on a track or other softer surface. Pt states that his back continues to feel good, still has tightness. OBJECTIVE:   ROM   Comments   Trunk flexion Finger tips 11 inches from ground     Trunk extension 1.5 inches, hinge at lumbar spine     Trunk R sidebend Finger tops just above joint line P!  Through L QL   Trunk L sidebend Finger tips to joint line     Trunk R rotation 43 deg     Trunk L rotation 47 deg   HS flexibility R SLR 58 deg  L SLR 65 deg                        Strength Left Right Comments   Hip flexion(L2) 4+/5 4+/5     Knee extension(L3) 4+/5 4+/5     Knee flexion(S1-2) 4+/5 4+/5     Ankle dorsiflexion(L4) 4+/5 4+/5     Toe extension(L5) 4+/5 4+/5     Ankle eversion/plantar flexion(S1) 4+/5 4+/5     Hip abd   3+/5 hip extended  4/5 neutral hip 3+/5 hip extended  4/5 neutral hip        Special tests   Comments   SLR + for HS tightness     Slump test Not indicated     Pelvic symmetry  Not assessed     Segmental Spinal mobility hypomobile     Heel walk Not assessed     Toe walk Not assessed     Tandem walk Not assessed      SHAYAN  R knee 7 inches from table  L knee 6 inches from table        Joint mobility:               [x]? Normal               []? Hypo              []? Hyper     Palpation: TTP R glute     Functional Mobility/Transfers: Independent     Posture: WNL     Bandages/Dressings/Incisions:   8/5/21 surgical incision well healed     Gait: wide THANIA, B out toeing    RESTRICTIONS/PRECAUTIONS:     Exercises/Interventions:   ROM/stretches     SKTC    Supine figure 4 2x30\" B   Prone quad  DC'd 8/26 d/t exacerbation of symptoms   Child pose     Child pose with QL bias     Seated HS HEP    Half kneel hip flexor 3x30\" Knee on airex   Standing quad stretch  Foot on chair        Strengthening     TA brace    glute sets        Bridges Foam roll between knees   Clamshells    IR clamshell Foam roll between knees, rotate to neutral    Sidelying hip flexion              Deadbug Increased hip ext during heel tap, arms held at 90 deg shoulder flex   Isometric deadbug    Bear Plank 3x30/30/60\"    Side Plank 3x10\", each side    OH reach 3x10 7#              1/2 kneel stability Opp knee and foot in tandem position       Side stepping Slight trunk flexion   SL iso squat with slider 3x10 B opp LE on slider        SLS 2x20\" airex.  B                  Manual Intervention             Prone PA      GISTM/STM  Lumbar Manip SI Manip      Hip belt mobs      Hip LA distraction              Plan for next session: progress as tolerated     Therapeutic Exercise and NMR EXR  [x] (31008) Provided verbal/tactile cueing for activities related to strengthening, flexibility, endurance, ROM  for improvements in proximal hip and core control with self care, mobility, lifting and ambulation.  [] (70428) Provided verbal/tactile cueing for activities related to improving balance, coordination, kinesthetic sense, posture, motor skill, proprioception  to assist with core control in self care, mobility, lifting, and ambulation. Therapeutic Activities:    [] (25414 or 91879) Provided verbal/tactile cueing for activities related to improving balance, coordination, kinesthetic sense, posture, motor skill, proprioception and motor activation to allow for proper function  with self care and ADLs  [] (17542) Provided training and instruction to the patient for proper core and proximal hip recruitment and positioning with ambulation re-education     Home Exercise Program:    [x] (16137) Reviewed/Progressed HEP activities related to strengthening, flexibility, endurance, ROM of core, proximal hip and LE for functional self-care, mobility, lifting and ambulation   [] (34502) Reviewed/Progressed HEP activities related to improving balance, coordination, kinesthetic sense, posture, motor skill, proprioception of core, proximal hip and LE for self care, mobility, lifting, and ambulation      Manual Treatments:    [] (96728) Provided manual therapy to mobilize proximal hip and LS spine soft tissue/joints for the purpose of modulating pain, promoting relaxation,  increasing ROM, reducing/eliminating soft tissue swelling/inflammation/restriction, improving soft tissue extensibility and allowing for proper ROM for normal function with self care, mobility, lifting and ambulation.      Modalities:   None    Charges:  Timed Code Treatment Minutes: 48   Total Treatment Minutes: 48   Time in: 7:47  Time out: 8:35    [] EVAL (LOW) 81118 (typically 20 minutes face-to-face)  [] EVAL (MOD) 02983 (typically 30 minutes face-to-face)  [] EVAL (HIGH) 38665 (typically 45 minutes face-to-face)  [] RE-EVAL     [] GM(80899) x      [] IONTO  [x] NMR (57576) x 1    [] VASO  [] Manual (00760) x      [] Other:  [x] TA x 2     [] Mech Traction (73918)  [] ES(attended) (50855)      [] ES (un) (96003):     Goals:   Access Code: [email or web address]  URL: [email or web address]  Date: 08/11/2021  Prepared by: Marco Shelton     Exercises  · Prone Quadriceps Stretch with Strap - 1 x daily - 7 x weekly - 1 sets - 3-5 reps - 30 second hold  · Supine Figure 4 Piriformis Stretch - 1 x daily - 7 x weekly - 1 sets - 3-5 reps - 30 second hold  · Supine Single Knee to Chest Stretch - 1 x daily - 7 x weekly - 1 sets - 3-5 reps - 30 second hold  · Seated Table Hamstring Stretch - 1 x daily - 7 x weekly - 1 sets - 3-5 reps - 30 second hold  · Child's Pose with Sidebending - 1 x daily - 7 x weekly - 1 sets - 3-5 reps - 30 second hold  · Child's Pose Stretch - 1 x daily - 7 x weekly - 1 sets - 3-5 reps - 30 second hold  · Supine Transversus Abdominis Bracing - Hands on Stomach - 1 x daily - 7 x weekly - 1 sets - 10 reps - 10 second hold  · Supine Gluteal Sets - 1 x daily - 7 x weekly - 1 sets - 10 reps - 10 second hold  · Supine Bridge with Mini Swiss Ball Between Knees - 1 x daily - 7 x weekly - 3 sets - 10 reps  · Supine March - 1 x daily - 7 x weekly - 3 sets - 10 reps  · Clamshell - 1 x daily - 7 x weekly - 3 sets - 10 reps  · Sidelying Clamshell in Neutral - 1 x daily - 7 x weekly - 3 sets - 10 reps    Overall Progression Towards Functional goals/ Treatment Progress Update:  [] Patient is progressing as expected towards functional goals listed. [] Progression is slowed due to complexities/Impairments listed. [] Progression has been slowed due to co-morbidities.   [x] Plan just implemented, too soon to assess goals progression <30days   [] Goals require adjustment due to lack of progress  [] Patient is not progressing as expected and requires additional follow up with physician  [] Other    Prognosis for POC: [x] Good [] Fair  [] Poor      Patient requires continued skilled intervention: [x] Yes  [] No    Treatment/Activity Tolerance:  [x] Patient able to complete treatment  [] Patient limited by fatigue  [] Patient limited by pain     [] Patient limited by other medical complications  [] Other: Pt tolerated bear planks well. Very challenged by side planks, required a lot of initial cueing for proper form and body position- difficulty lifting thigh fully off table to have contact points on through knee and forearm during hold. Pt tolerated OH reaches well, reported core fatigue. Pt challenged by iso slider squats, difficulty maintaining glute activation of stance leg and required initial cueing on proper form/amount of slide with LE on slider as well cues to maintain isometric squat position rather than bending and straightening the knee each rep. Pt continues to be challenged by SLS but was able to introduce airex this date, increased use of ankle and hip strategy as well as use of UEs for counterbalance. Pt requires PT follow up to address flexibility, strength and functional mobility deficits. Patient education:  8/5/21 Pt educated on diagnosis, prognosis and PT plan of care. Educated on [address]. Pt questions were addressed and answered. Prognosis: [x] Good [] Fair  [] Poor    Patient Requires Follow-up: [x] Yes  [] No    PLAN: See eval  [] Continue per plan of care [] Alter current plan (see comments)  [x] Plan of care initiated [] Hold pending MD visit [] Discharge    Electronically signed by: Dale Skaggs, PT DPT, OCS  Physical Therapist  [id number]  [email or web address]      *If patient does not return for further follow ups after this date. Please consider this as the patients discharge from physical therapy.

## 2021-09-16 DIAGNOSIS — E11.9 TYPE 2 DIABETES MELLITUS WITHOUT COMPLICATION, WITHOUT LONG-TERM CURRENT USE OF INSULIN (HCC): ICD-10-CM

## 2021-09-16 DIAGNOSIS — E78.00 PURE HYPERCHOLESTEROLEMIA: ICD-10-CM

## 2021-09-16 RX ORDER — ATORVASTATIN CALCIUM 20 MG/1
20 TABLET, FILM COATED ORAL DAILY
Qty: 90 TABLET | Refills: 1 | Status: SHIPPED | OUTPATIENT
Start: 2021-09-16 | End: 2022-03-15

## 2021-09-16 NOTE — TELEPHONE ENCOUNTER
Requested Prescriptions     Pending Prescriptions Disp Refills    sertraline (ZOLOFT) 50 MG tablet [Pharmacy Med Name: SERTRALINE 50MG TABLETS] 90 tablet 1     Sig: TAKE 1 TABLET BY MOUTH DAILY    atorvastatin (LIPITOR) 20 MG tablet [Pharmacy Med Name: ATORVASTATIN 20MG TABLETS] 90 tablet 1     Sig: TAKE 1 TABLET BY MOUTH DAILY     Last ov 7/13/21  Last labs 5/14/21

## 2021-09-23 ENCOUNTER — HOSPITAL ENCOUNTER (OUTPATIENT)
Dept: PHYSICAL THERAPY | Age: 71
Setting detail: THERAPIES SERIES
Discharge: HOME | End: 2021-09-23
Payer: MEDICARE

## 2021-09-23 NOTE — FLOWSHEET NOTE
The [facility]     Physical Therapy  Cancellation/No-show Note  Patient Name:  Ramone Lutz  :  1950   Date:  2021  Cancelled visits to date: 0  No-shows to date: 1    For today's appointment patient:  []  Cancelled  []  Rescheduled appointment  [x]  No-show     Reason given by patient:  []  Patient ill  []  Conflicting appointment   []  No transportation    []  Conflict with work  []  No reason given  []  Other:     Comments:      Electronically signed by:  Farhat Wild PT, DPT, OCS  Physical Therapist  [id number]  [email or web address]

## 2021-10-02 PROBLEM — S52.515A CLOSED NONDISPLACED FRACTURE OF STYLOID PROCESS OF LEFT RADIUS: Status: ACTIVE | Noted: 2021-10-01

## 2021-10-11 ENCOUNTER — OFFICE VISIT (OUTPATIENT)
Dept: ORTHOPEDIC SURGERY | Age: 71
End: 2021-10-11
Payer: MEDICARE

## 2021-10-11 VITALS — HEIGHT: 68 IN | WEIGHT: 218 LBS | BODY MASS INDEX: 33.04 KG/M2

## 2021-10-11 DIAGNOSIS — M25.532 LEFT WRIST PAIN: ICD-10-CM

## 2021-10-11 DIAGNOSIS — S52.515A NONDISPLACED FRACTURE OF LEFT RADIAL STYLOID PROCESS, INITIAL ENCOUNTER FOR CLOSED FRACTURE: Primary | ICD-10-CM

## 2021-10-11 PROCEDURE — 4040F PNEUMOC VAC/ADMIN/RCVD: CPT | Performed by: INTERNAL MEDICINE

## 2021-10-11 PROCEDURE — G8417 CALC BMI ABV UP PARAM F/U: HCPCS | Performed by: INTERNAL MEDICINE

## 2021-10-11 PROCEDURE — G8427 DOCREV CUR MEDS BY ELIG CLIN: HCPCS | Performed by: INTERNAL MEDICINE

## 2021-10-11 PROCEDURE — 3017F COLORECTAL CA SCREEN DOC REV: CPT | Performed by: INTERNAL MEDICINE

## 2021-10-11 PROCEDURE — 1036F TOBACCO NON-USER: CPT | Performed by: INTERNAL MEDICINE

## 2021-10-11 PROCEDURE — 99203 OFFICE O/P NEW LOW 30 MIN: CPT | Performed by: INTERNAL MEDICINE

## 2021-10-11 PROCEDURE — 1123F ACP DISCUSS/DSCN MKR DOCD: CPT | Performed by: INTERNAL MEDICINE

## 2021-10-11 PROCEDURE — G8484 FLU IMMUNIZE NO ADMIN: HCPCS | Performed by: INTERNAL MEDICINE

## 2021-10-14 NOTE — PROGRESS NOTES
Chief Complaint:   Chief Complaint   Patient presents with    Arm Pain     LT wrist - fell 9/30/21- seen at ED in [place]           History of Present Illness:       Patient is a 70 y.o. male presents with the above complaint. The symptoms began 9/30/2021 and started with an injury. The patient describes a sharp pain that does radiate. The symptoms are intermittent  and are are improving since the onset. He was fishing in [place] lost his footing and fell on outstretched hand into the farr of his boat. He had acute pain related to this he was on vacation unable to seek attention. Within 48 hours he was placed in a splint and presents here for further evaluation and management. Pain localizes radial area and is not associated with mechanical symptoms. The patient denies subjective instability about the wrist and denies new onset weakness of the upper extremity. Pain level 7    The patient denies a pattern of activity related swelling. Treatment to date: Fiberglass splint application    There is nono prior history of wrist trauma. There is no prior history of autoimmune disease, inflammatory arthropathy, or crystal arthropathy.         Past Medical History:        Past Medical History:   Diagnosis Date    AV block, 1st degree     CAD (coronary artery disease) 04/2011    Dr. Blancas Man    Calculus of kidney 12/01/2010    Chondromalacia of patella     Closed nondisplaced fracture of styloid process of left radius 10/2021    DDD (degenerative disc disease), lumbar     L2-3; L4-5    DDD (degenerative disc disease), lumbar 04/2019    L1-2; L2-3, L3-4; L4-5; L5-S1    Degeneration of cervical intervertebral disc      Degenerative arthritis of left foot 07/2016    Depression and anxiety      Diabetes mellitus, type 2 ([address] 04/2019    diet controlled    ED (erectile dysfunction)      Esophageal reflux      Lumbar stenosis 02/2020    L2-3; L3-4; L4-5    Obstructive sleep apnea 2003    CPAP    Prediabetes 04/2015    Pure hypercholesterolemia      Retinal tears 1980, 1997    Spinal stenosis in cervical region      Testosterone deficiency 04/2012    Tricuspid regurgitation      Ventricular tachycardia, nonsustained          Past Surgical History:   Procedure Laterality Date    APPENDECTOMY  1958    CERVICAL FUSION  12/9/2010    ACDF C7-T1with Bilateral C8 Root Decompression, Zero & Impant w/synthesis Removal of C7 screw on Left   [address]    right    KNEE SURGERY  1994    left    KNEE SURGERY  2001    right    PAIN MANAGEMENT PROCEDURE N/A 3/18/2020    BILATERAL L3 TRANSFORAMINAL  EPIDURAL STEROID INJECTION WITH FLUOROSCOPY performed by Vijay Basurto MD at [facility] Bilateral 5/13/2020    BILATERAL L4 TRANSFORAMINAL EPIDURAL STEROID INJECTION WITH FLUOROSCOPY performed by Vijay Basurto MD at [facility]  08/2016    PTCA  4 26 2011    with stent LAD    TONSILLECTOMY  1955    VASECTOMY  1989         Present Medications:         Current Outpatient Medications   Medication Sig Dispense Refill    sertraline (ZOLOFT) 50 MG tablet TAKE 1 TABLET BY MOUTH DAILY 90 tablet 1    atorvastatin (LIPITOR) 20 MG tablet TAKE 1 TABLET BY MOUTH DAILY 90 tablet 1    aspirin 81 MG EC tablet Take 81 mg by mouth daily      metoprolol succinate (TOPROL XL) 25 MG extended release tablet Take 1 tablet by mouth daily 90 tablet 1    famotidine (PEPCID) 20 MG tablet Take 1 tablet by mouth 2 times daily 180 tablet 1    vitamin E 1000 units capsule Take 1,000 Units by mouth daily      lisinopril (PRINIVIL;ZESTRIL) 10 MG tablet Take 1 tablet by mouth daily (Patient taking differently: Take 20 mg by mouth daily ) 90 tablet 1    clopidogrel (PLAVIX) 75 MG tablet Take 1 tablet by mouth daily 90 tablet 1    Probiotic Product (PROBIOTIC DAILY PO) Take 1 tablet by mouth daily      sildenafil (REVATIO) 20 MG tablet Take 1 - 5 pills po prn ED.  90 tablet 1    therapeutic multivitamin-minerals (THERAGRAN-M) tablet Take 1 tablet by mouth daily.  Ascorbic Acid (VITAMIN C) 1000 MG tablet Take 1,000 mg by mouth daily.  vitamin B-12 (CYANOCOBALAMIN) 1000 MCG tablet Take 1,000 mcg by mouth daily.  Magnesium 500 MG TABS Take 1 tablet by mouth daily 750mg      fish oil-omega-3 fatty acids 1000 MG capsule Take 2 g by mouth daily.  Glucosamine-Chondroitin 750-600 MG TABS Take 1 tablet by mouth 2 times daily.  calcium carbonate 600 MG TABS tablet Take 1 tablet by mouth daily. No current facility-administered medications for this visit. Allergies: Allergies   Allergen Reactions    Amoxicillin Hives and Swelling        Social History:         Social History     Socioeconomic History    Marital status:      Spouse name: Not on file    Number of children: Not on file    Years of education: Not on file    Highest education level: Not on file   Occupational History    Not on file   Tobacco Use    Smoking status: Never Smoker    Smokeless tobacco: Never Used   Substance and Sexual Activity    Alcohol use: Yes     Alcohol/week: 3.0 - 6.0 standard drinks     Types: 3 - 6 Cans of beer per week     Comment: weekly    Drug use: No    Sexual activity: Yes     Partners: Female   Other Topics Concern    Not on file   Social History Narrative    Not on file     Social Determinants of Health     Financial Resource Strain: Low Risk     Difficulty of Paying Living Expenses: Not hard at all   Food Insecurity: No Food Insecurity    Worried About [address] in the Last Year: Never true    [facility] in the Last Year: Never true   Transportation Needs:     Lack of Transportation (Medical):      Lack of Transportation (Non-Medical):    Physical Activity:     Days of Exercise per Week:     Minutes of Exercise per Session:    Stress:     Feeling of Stress :    Social Connections:     Frequency of Communication with Friends and Family:     Frequency of Social Gatherings with Friends and Family:     Attends [employer] Services:     Active Member of Clubs or Organizations:     Attends Club or Organization Meetings:     Marital Status:    Intimate Partner Violence:     Fear of Current or Ex-Partner:     Emotionally Abused:     Physically Abused:     Sexually Abused:         Review of Symptoms:    Pertinent items are noted in HPI    Review of systems reviewed from Patient History Form dated on today's date and   available in the patient's chart under the Media tab. Vital Signs: There were no vitals filed for this visit. General Exam:     Constitutional: Patient is adequately groomed with no evidence of malnutrition  Mental Status: The patient is oriented to time, place and person. The patient's mood and affect are appropriate. Vascular: Examination reveals no swelling or calf tenderness. Peripheral pulses are palpable and 2+. Lymphatics: no lymphadenopathy of the inguinal region or lower extremity      Physical Exam: left wrist      Primary Exam:    Inspection: Minimal asymmetric soft tissue swelling no deformity or atrophy      Palpation: Focal tenderness over the radial styloid process      Range of Motion: Mild global decreased with low-grade pain      Strength: Normal at the digits      Special Tests: Tinel's negative at the wrist      Skin: There are no rashes, ulcerations or lesions. Gait: Nonantalgic      Reflex intact upper     Additional Comments:        Additional Examinations:           Right Upper Extremity:  Examination of the right upper extremity does not show any tenderness, deformity or injury. Range of motion is unremarkable. There is no gross instability. There are no rashes, ulcerations or lesions. Strength and tone are normal.   Neurologic -Light touch sensation and manual muscle testing is normal C5-C8 . Biceps and triceps reflexes are symmetric and +2. Spurlling sign is negative         Office Imaging Results/Procedures PerformedToday:      Radiology:      X-rays obtained and reviewed in office:   Views 3 views left wrist     Location left wrist   Impression there is a relatively nondisplaced fracture of the radial styloid with approximately 1 to 2 mm of distraction of the fracture fragment no malrotation. The fracture extends into the radiocarpal joint and involves approximate 10% of the joint articulation. Articular surface is congruent. Office Procedures:     Orders Placed This Encounter   Procedures    XR WRIST LEFT (MIN 3 VIEWS)     Standing Status:   Future     Number of Occurrences:   1     Standing Expiration Date:   10/11/2022   [facility] Short Thumb Spica     Patient was prescribed a Exos Short Arm Fracture Brace. The left wrist will require stabilization / immobilization from this semi-rigid / rigid orthosis to improve their function. The orthosis will assist in protecting the affected area, provide functional support and facilitate healing. The orthosis used a heating element to mold and shape the brace to provide a customizable fit for the patient. The patient was educated and fit by a healthcare professional with expert knowledge and specialization in brace application while under the direct supervision of the treating physician. Verbal and written instructions for the use of and application of this item were provided. They were instructed to contact the office immediately should the brace result in increased pain, decreased sensation, increased swelling or worsening of the condition. Other Outside Imaging and Testing Personally Reviewed:    XR WRIST LEFT (MIN 3 VIEWS)    Result Date: 10/11/2021  Radiology exam is complete. No Radiologist dictation. Please follow up with ordering provider. Assessment   Impression: . Encounter Diagnoses   Name Primary?     Nondisplaced fracture of left radial styloid process, initial encounter for closed fracture Yes    Left wrist pain               Plan:       Thumb spica cast brace immobilization-continuous off for personal hygiene only  Repeat x-rays 3 view of the wrist on follow-up  Anticipate 2 to 3 weeks of additional immobilization. Pain management with OTC NSAIDs with GI precaution and or Tylenol      The nature of the finding, probable diagnosis and likely treatment was thoroughly discussed with the patient. The options, risks, complications, alternative treatment as well as some of the differential diagnosis was discussed. The patient was thoroughly informed and all questions were answered. the patient indicated understanding and satisfaction with the discussion. Orders:        Orders Placed This Encounter   Procedures    XR WRIST LEFT (MIN 3 VIEWS)     Standing Status:   Future     Number of Occurrences:   1     Standing Expiration Date:   10/11/2022   [facility] Short Thumb Spica     Patient was prescribed a Exos Short Arm Fracture Brace. The left wrist will require stabilization / immobilization from this semi-rigid / rigid orthosis to improve their function. The orthosis will assist in protecting the affected area, provide functional support and facilitate healing. The orthosis used a heating element to mold and shape the brace to provide a customizable fit for the patient. The patient was educated and fit by a healthcare professional with expert knowledge and specialization in brace application while under the direct supervision of the treating physician. Verbal and written instructions for the use of and application of this item were provided. They were instructed to contact the office immediately should the brace result in increased pain, decreased sensation, increased swelling or worsening of the condition. Disclaimer: \"This note was dictated with voice recognition software.  Though review and correction are routine, we apologize for any errors. \"

## 2021-10-15 ENCOUNTER — TELEPHONE (OUTPATIENT)
Dept: ORTHOPEDIC SURGERY | Age: 71
End: 2021-10-15

## 2021-10-15 NOTE — TELEPHONE ENCOUNTER
Other Patient is calling and would like to know if he needs to follow up in the office.   [phone]

## 2021-10-27 ENCOUNTER — OFFICE VISIT (OUTPATIENT)
Dept: ORTHOPEDIC SURGERY | Age: 71
End: 2021-10-27
Payer: MEDICARE

## 2021-10-27 VITALS — BODY MASS INDEX: 34.21 KG/M2 | WEIGHT: 218 LBS | HEIGHT: 67 IN

## 2021-10-27 DIAGNOSIS — M25.532 WRIST PAIN, LEFT: ICD-10-CM

## 2021-10-27 DIAGNOSIS — S52.515D NONDISPLACED FRACTURE OF LEFT RADIAL STYLOID PROCESS, SUBSEQUENT ENCOUNTER FOR CLOSED FRACTURE WITH ROUTINE HEALING: Primary | ICD-10-CM

## 2021-10-27 PROCEDURE — G8427 DOCREV CUR MEDS BY ELIG CLIN: HCPCS | Performed by: INTERNAL MEDICINE

## 2021-10-27 PROCEDURE — 1036F TOBACCO NON-USER: CPT | Performed by: INTERNAL MEDICINE

## 2021-10-27 PROCEDURE — 1123F ACP DISCUSS/DSCN MKR DOCD: CPT | Performed by: INTERNAL MEDICINE

## 2021-10-27 PROCEDURE — 99213 OFFICE O/P EST LOW 20 MIN: CPT | Performed by: INTERNAL MEDICINE

## 2021-10-27 PROCEDURE — 3017F COLORECTAL CA SCREEN DOC REV: CPT | Performed by: INTERNAL MEDICINE

## 2021-10-27 PROCEDURE — L3908 WHO COCK-UP NONMOLDE PRE OTS: HCPCS | Performed by: INTERNAL MEDICINE

## 2021-10-27 PROCEDURE — 4040F PNEUMOC VAC/ADMIN/RCVD: CPT | Performed by: INTERNAL MEDICINE

## 2021-10-27 PROCEDURE — G8417 CALC BMI ABV UP PARAM F/U: HCPCS | Performed by: INTERNAL MEDICINE

## 2021-10-27 PROCEDURE — G8484 FLU IMMUNIZE NO ADMIN: HCPCS | Performed by: INTERNAL MEDICINE

## 2021-11-01 NOTE — PROGRESS NOTES
TABLET BY MOUTH DAILY 90 tablet 1    atorvastatin (LIPITOR) 20 MG tablet TAKE 1 TABLET BY MOUTH DAILY 90 tablet 1    aspirin 81 MG EC tablet Take 81 mg by mouth daily      metoprolol succinate (TOPROL XL) 25 MG extended release tablet Take 1 tablet by mouth daily 90 tablet 1    famotidine (PEPCID) 20 MG tablet Take 1 tablet by mouth 2 times daily 180 tablet 1    vitamin E 1000 units capsule Take 1,000 Units by mouth daily      lisinopril (PRINIVIL;ZESTRIL) 10 MG tablet Take 1 tablet by mouth daily (Patient taking differently: Take 20 mg by mouth daily ) 90 tablet 1    clopidogrel (PLAVIX) 75 MG tablet Take 1 tablet by mouth daily 90 tablet 1    Probiotic Product (PROBIOTIC DAILY PO) Take 1 tablet by mouth daily      sildenafil (REVATIO) 20 MG tablet Take 1 - 5 pills po prn ED. 90 tablet 1    therapeutic multivitamin-minerals (THERAGRAN-M) tablet Take 1 tablet by mouth daily.  Ascorbic Acid (VITAMIN C) 1000 MG tablet Take 1,000 mg by mouth daily.  vitamin B-12 (CYANOCOBALAMIN) 1000 MCG tablet Take 1,000 mcg by mouth daily.  Magnesium 500 MG TABS Take 1 tablet by mouth daily 750mg      fish oil-omega-3 fatty acids 1000 MG capsule Take 2 g by mouth daily.  Glucosamine-Chondroitin 750-600 MG TABS Take 1 tablet by mouth 2 times daily.  calcium carbonate 600 MG TABS tablet Take 1 tablet by mouth daily. No current facility-administered medications for this visit. Allergies: Allergies   Allergen Reactions    Amoxicillin Hives and Swelling           Review of Systems:    Pertinent items are noted in HPI     Vital Signs: There were no vitals filed for this visit.      General Exam:     Constitutional: Patient is adequately groomed with no evidence of malnutrition    Physical Exam: left wrist      Primary Exam:    Inspection: No deformity atrophy appreciable effusion      Palpation: N minimal tenderness over the radial styloid      Range of Motion: Full range with low-grade discomfort in end ranges      Strength: Normal at the digits purposely not assessed at the wrist      Special Tests: Negative Tinel's at the wrist      Skin: There are no rashes, ulcerations or lesions. Gait: Nonantalgic     Neurovascular - non focal and intact     Additional Comments:        Additional Examinations:               Office Imaging Results/Procedures PerformedToday:     X-rays 3 view of the left wrist  Impression: Stable appearance of the radial styloid oblique fracture without interval displacement or angulation or malrotation. This involves approximately 10% of the articular surface. Articular surface of the radius remains congruent. Office Procedures:     Orders Placed This Encounter   Procedures    XR WRIST LEFT (MIN 3 VIEWS)     Standing Status:   Future     Number of Occurrences:   1     Standing Expiration Date:   10/27/2022     Order Specific Question:   Reason for exam:     Answer:   left wrist f/u    Adela Link Titan Wrist and Thumb Brace     Patient was prescribed a Adela Link Titan Wrist and Thumb Brace. The left wrist and thumb will require stabilization / immobilization from this semi-rigid / rigid orthosis to improve their function. The orthosis will assist in protecting the affected area, provide functional support and facilitate healing. The patient was educated and fit by a healthcare professional with expert knowledge and specialization in brace application while under the direct supervision of the treating physician. Verbal and written instructions for the use of and application of this item were provided. They were instructed to contact the office immediately should the brace result in increased pain, decreased sensation, increased swelling or worsening of the condition. Other Outside Imaging and Testing Personally Reviewed:    XR WRIST LEFT (MIN 3 VIEWS)    Result Date: 10/27/2021  Radiology exam is complete.  No Radiologist dictation. Please follow up with ordering provider. Assessment   Impression: . Encounter Diagnoses   Name Primary?  Nondisplaced fracture of left radial styloid process, subsequent encounter for closed fracture with routine healing Yes    Wrist pain, left               Plan:     Transition to wrist thumb spica splint immobilization and return his EXOS hand-based thumb spica splint  Continue immobilization continuous off for personal hygiene for an additional 2 weeks  Clinical reassessment in 2 weeks for review of the wrist         Orders:        Orders Placed This Encounter   Procedures    XR WRIST LEFT (MIN 3 VIEWS)     Standing Status:   Future     Number of Occurrences:   1     Standing Expiration Date:   10/27/2022     Order Specific Question:   Reason for exam:     Answer:   left wrist f/u    Adela Link Titan Wrist and Thumb Brace     Patient was prescribed a Adela Link Titan Wrist and Thumb Brace. The left wrist and thumb will require stabilization / immobilization from this semi-rigid / rigid orthosis to improve their function. The orthosis will assist in protecting the affected area, provide functional support and facilitate healing. The patient was educated and fit by a healthcare professional with expert knowledge and specialization in brace application while under the direct supervision of the treating physician. Verbal and written instructions for the use of and application of this item were provided. They were instructed to contact the office immediately should the brace result in increased pain, decreased sensation, increased swelling or worsening of the condition. Mariella Dubois MD.      Disclaimer: \"This note was dictated with voice recognition software. Though review and correction are routine, we apologize for any errors. \"

## 2021-11-12 DIAGNOSIS — M25.532 LEFT WRIST PAIN: ICD-10-CM

## 2021-11-12 DIAGNOSIS — S52.515D NONDISPLACED FRACTURE OF LEFT RADIAL STYLOID PROCESS, SUBSEQUENT ENCOUNTER FOR CLOSED FRACTURE WITH ROUTINE HEALING: Primary | ICD-10-CM

## 2021-11-12 PROCEDURE — L3908 WHO COCK-UP NONMOLDE PRE OTS: HCPCS | Performed by: INTERNAL MEDICINE

## 2021-11-18 ENCOUNTER — OFFICE VISIT (OUTPATIENT)
Dept: ORTHOPEDIC SURGERY | Age: 71
End: 2021-11-18
Payer: MEDICARE

## 2021-11-18 VITALS — HEIGHT: 67 IN | WEIGHT: 218 LBS | BODY MASS INDEX: 34.21 KG/M2

## 2021-11-18 DIAGNOSIS — M25.532 LEFT WRIST PAIN: ICD-10-CM

## 2021-11-18 DIAGNOSIS — S52.515D NONDISPLACED FRACTURE OF LEFT RADIAL STYLOID PROCESS, SUBSEQUENT ENCOUNTER FOR CLOSED FRACTURE WITH ROUTINE HEALING: Primary | ICD-10-CM

## 2021-11-18 PROCEDURE — G8484 FLU IMMUNIZE NO ADMIN: HCPCS | Performed by: INTERNAL MEDICINE

## 2021-11-18 PROCEDURE — 99213 OFFICE O/P EST LOW 20 MIN: CPT | Performed by: INTERNAL MEDICINE

## 2021-11-18 PROCEDURE — 1036F TOBACCO NON-USER: CPT | Performed by: INTERNAL MEDICINE

## 2021-11-18 PROCEDURE — 1123F ACP DISCUSS/DSCN MKR DOCD: CPT | Performed by: INTERNAL MEDICINE

## 2021-11-18 PROCEDURE — G8427 DOCREV CUR MEDS BY ELIG CLIN: HCPCS | Performed by: INTERNAL MEDICINE

## 2021-11-18 PROCEDURE — 4040F PNEUMOC VAC/ADMIN/RCVD: CPT | Performed by: INTERNAL MEDICINE

## 2021-11-18 PROCEDURE — 3017F COLORECTAL CA SCREEN DOC REV: CPT | Performed by: INTERNAL MEDICINE

## 2021-11-18 PROCEDURE — G8417 CALC BMI ABV UP PARAM F/U: HCPCS | Performed by: INTERNAL MEDICINE

## 2021-11-18 NOTE — PROGRESS NOTES
Chief Complaint:   Chief Complaint   Patient presents with    Wrist Pain     Lt wrist f/u. Pt states that the wrist feels fine          History of Present Illness:       Patient is a 70 y.o. male returns follow up for the above complaint. The patient was last seen approximately 3 weeksago. The symptoms are improving since the last visit. The patient has had no further testing for the problem.       Date of fracture 9/30/2021     Past Medical History:        Past Medical History:   Diagnosis Date    AV block, 1st degree     CAD (coronary artery disease) 04/2011    Dr. Kyleigh Babin    Calculus of kidney 12/01/2010    Chondromalacia of patella     Closed nondisplaced fracture of styloid process of left radius 10/2021    DDD (degenerative disc disease), lumbar     L2-3; L4-5    DDD (degenerative disc disease), lumbar 04/2019    L1-2; L2-3, L3-4; L4-5; L5-S1    Degeneration of cervical intervertebral disc      Degenerative arthritis of left foot 07/2016    Depression and anxiety      Diabetes mellitus, type 2 ([address] 04/2019    diet controlled    ED (erectile dysfunction)      Esophageal reflux      Lumbar stenosis 02/2020    L2-3; L3-4; L4-5    Obstructive sleep apnea 2003    CPAP    Prediabetes 04/2015    Pure hypercholesterolemia      Retinal tears 1980, 1997    Spinal stenosis in cervical region      Testosterone deficiency 04/2012    Tricuspid regurgitation      Ventricular tachycardia, nonsustained         Present Medications:         Current Outpatient Medications   Medication Sig Dispense Refill    sertraline (ZOLOFT) 50 MG tablet TAKE 1 TABLET BY MOUTH DAILY 90 tablet 1    atorvastatin (LIPITOR) 20 MG tablet TAKE 1 TABLET BY MOUTH DAILY 90 tablet 1    aspirin 81 MG EC tablet Take 81 mg by mouth daily      metoprolol succinate (TOPROL XL) 25 MG extended release tablet Take 1 tablet by mouth daily 90 tablet 1    famotidine (PEPCID) 20 MG tablet Take 1 tablet by mouth 2 times daily 180 dictated with voice recognition software. Though review and correction are routine, we apologize for any errors. \"

## 2021-12-15 DIAGNOSIS — Z01.818 PRE-OP EXAM: ICD-10-CM

## 2021-12-15 DIAGNOSIS — I10 ESSENTIAL HYPERTENSION: ICD-10-CM

## 2021-12-15 RX ORDER — METOPROLOL SUCCINATE 25 MG/1
25 TABLET, EXTENDED RELEASE ORAL DAILY
Qty: 90 TABLET | Refills: 1 | Status: SHIPPED | OUTPATIENT
Start: 2021-12-15 | End: 2022-07-27 | Stop reason: SDUPTHER

## 2021-12-15 NOTE — TELEPHONE ENCOUNTER
Requested Prescriptions     Pending Prescriptions Disp Refills    metoprolol succinate (TOPROL XL) 25 MG extended release tablet [Pharmacy Med Name: METOPROLOL ER SUCCINATE 25MG TABS] 90 tablet 1     Sig: TAKE 1 TABLET BY MOUTH DAILY     7/13/2021  Last ov 5/14/2021

## 2021-12-21 ENCOUNTER — PATIENT MESSAGE (OUTPATIENT)
Dept: FAMILY MEDICINE CLINIC | Age: 71
End: 2021-12-21

## 2021-12-21 DIAGNOSIS — I10 ESSENTIAL HYPERTENSION: ICD-10-CM

## 2021-12-22 RX ORDER — LISINOPRIL 10 MG/1
10 TABLET ORAL DAILY
Qty: 90 TABLET | Refills: 1 | Status: SHIPPED | OUTPATIENT
Start: 2021-12-22 | End: 2022-01-03

## 2021-12-22 NOTE — TELEPHONE ENCOUNTER
From: Shannan Andrew  To: Dr. Burrell Nest: 12/21/2021 11:51 AM EST  Subject: Lisinopril Refill    MyChart is not allowing me to refill Lisinopril.

## 2022-01-03 DIAGNOSIS — I10 ESSENTIAL HYPERTENSION: ICD-10-CM

## 2022-01-03 RX ORDER — LISINOPRIL 10 MG/1
10 TABLET ORAL DAILY
Qty: 90 TABLET | Refills: 0 | Status: SHIPPED | OUTPATIENT
Start: 2022-01-03 | End: 2022-04-07

## 2022-01-03 NOTE — TELEPHONE ENCOUNTER
Requested Prescriptions     Pending Prescriptions Disp Refills    lisinopril (PRINIVIL;ZESTRIL) 10 MG tablet [Pharmacy Med Name: LISINOPRIL 10MG TABLETS] 90 tablet 1     Sig: TAKE 1 TABLET BY MOUTH DAILY     7/13/2021  Last ov 5/14/2021

## 2022-01-26 DIAGNOSIS — Z12.5 PROSTATE CANCER SCREENING: ICD-10-CM

## 2022-01-26 DIAGNOSIS — Z00.00 ROUTINE GENERAL MEDICAL EXAMINATION AT A HEALTH CARE FACILITY: ICD-10-CM

## 2022-01-26 DIAGNOSIS — I10 ESSENTIAL HYPERTENSION: ICD-10-CM

## 2022-01-26 DIAGNOSIS — E11.9 TYPE 2 DIABETES MELLITUS WITHOUT COMPLICATION, WITHOUT LONG-TERM CURRENT USE OF INSULIN (HCC): ICD-10-CM

## 2022-01-26 DIAGNOSIS — E78.00 PURE HYPERCHOLESTEROLEMIA: ICD-10-CM

## 2022-01-26 DIAGNOSIS — I25.10 CORONARY ARTERY DISEASE INVOLVING NATIVE CORONARY ARTERY OF NATIVE HEART WITHOUT ANGINA PECTORIS: ICD-10-CM

## 2022-01-26 LAB
A/G RATIO: 2 (ref 1.1–2.2)
ALBUMIN SERPL-MCNC: 4.7 G/DL (ref 3.4–5)
ALP BLD-CCNC: 89 U/L (ref 40–129)
ALT SERPL-CCNC: 83 U/L (ref 10–40)
ANION GAP SERPL CALCULATED.3IONS-SCNC: 14 MMOL/L (ref 3–16)
AST SERPL-CCNC: 64 U/L (ref 15–37)
BASOPHILS ABSOLUTE: 0.1 K/UL (ref 0–0.2)
BASOPHILS RELATIVE PERCENT: 1.1 %
BILIRUB SERPL-MCNC: 1.1 MG/DL (ref 0–1)
BUN BLDV-MCNC: 15 MG/DL (ref 7–20)
CALCIUM SERPL-MCNC: 9.9 MG/DL (ref 8.3–10.6)
CHLORIDE BLD-SCNC: 99 MMOL/L (ref 99–110)
CHOLESTEROL, TOTAL: 187 MG/DL (ref 0–199)
CO2: 26 MMOL/L (ref 21–32)
CREAT SERPL-MCNC: 0.9 MG/DL (ref 0.8–1.3)
CREATININE URINE: 292.6 MG/DL (ref 39–259)
EOSINOPHILS ABSOLUTE: 0.2 K/UL (ref 0–0.6)
EOSINOPHILS RELATIVE PERCENT: 3.4 %
GFR AFRICAN AMERICAN: >60
GFR NON-AFRICAN AMERICAN: >60
GLUCOSE BLD-MCNC: 193 MG/DL (ref 70–99)
HCT VFR BLD CALC: 41.9 % (ref 40.5–52.5)
HDLC SERPL-MCNC: 34 MG/DL (ref 40–60)
HEMOGLOBIN: 14.2 G/DL (ref 13.5–17.5)
LDL CHOLESTEROL CALCULATED: 122 MG/DL
LYMPHOCYTES ABSOLUTE: 2.1 K/UL (ref 1–5.1)
LYMPHOCYTES RELATIVE PERCENT: 39.1 %
MCH RBC QN AUTO: 29.9 PG (ref 26–34)
MCHC RBC AUTO-ENTMCNC: 33.9 G/DL (ref 31–36)
MCV RBC AUTO: 88.2 FL (ref 80–100)
MICROALBUMIN UR-MCNC: 3.9 MG/DL
MICROALBUMIN/CREAT UR-RTO: 13.3 MG/G (ref 0–30)
MONOCYTES ABSOLUTE: 0.4 K/UL (ref 0–1.3)
MONOCYTES RELATIVE PERCENT: 8.1 %
NEUTROPHILS ABSOLUTE: 2.6 K/UL (ref 1.7–7.7)
NEUTROPHILS RELATIVE PERCENT: 48.3 %
PDW BLD-RTO: 12.8 % (ref 12.4–15.4)
PLATELET # BLD: 186 K/UL (ref 135–450)
PMV BLD AUTO: 8.6 FL (ref 5–10.5)
POTASSIUM SERPL-SCNC: 4.8 MMOL/L (ref 3.5–5.1)
PROSTATE SPECIFIC ANTIGEN: 5.95 NG/ML (ref 0–4)
RBC # BLD: 4.75 M/UL (ref 4.2–5.9)
SODIUM BLD-SCNC: 139 MMOL/L (ref 136–145)
TOTAL PROTEIN: 7.1 G/DL (ref 6.4–8.2)
TRIGL SERPL-MCNC: 157 MG/DL (ref 0–150)
VLDLC SERPL CALC-MCNC: 31 MG/DL
WBC # BLD: 5.5 K/UL (ref 4–11)

## 2022-01-27 ENCOUNTER — OFFICE VISIT (OUTPATIENT)
Dept: FAMILY MEDICINE CLINIC | Age: 72
End: 2022-01-27
Payer: MEDICARE

## 2022-01-27 VITALS
DIASTOLIC BLOOD PRESSURE: 86 MMHG | OXYGEN SATURATION: 99 % | WEIGHT: 212.2 LBS | RESPIRATION RATE: 13 BRPM | HEART RATE: 61 BPM | SYSTOLIC BLOOD PRESSURE: 136 MMHG | BODY MASS INDEX: 30.38 KG/M2 | TEMPERATURE: 97.3 F | HEIGHT: 70 IN

## 2022-01-27 DIAGNOSIS — I10 ESSENTIAL HYPERTENSION: ICD-10-CM

## 2022-01-27 DIAGNOSIS — E11.9 TYPE 2 DIABETES MELLITUS WITHOUT COMPLICATION, WITHOUT LONG-TERM CURRENT USE OF INSULIN (HCC): ICD-10-CM

## 2022-01-27 DIAGNOSIS — M48.061 SPINAL STENOSIS OF LUMBAR REGION, UNSPECIFIED WHETHER NEUROGENIC CLAUDICATION PRESENT: ICD-10-CM

## 2022-01-27 DIAGNOSIS — K21.9 GASTROESOPHAGEAL REFLUX DISEASE WITHOUT ESOPHAGITIS: ICD-10-CM

## 2022-01-27 DIAGNOSIS — M51.36 DDD (DEGENERATIVE DISC DISEASE), LUMBAR: ICD-10-CM

## 2022-01-27 DIAGNOSIS — I44.0 FIRST DEGREE ATRIOVENTRICULAR BLOCK: ICD-10-CM

## 2022-01-27 DIAGNOSIS — M50.30 DEGENERATION OF CERVICAL INTERVERTEBRAL DISC: ICD-10-CM

## 2022-01-27 DIAGNOSIS — I25.10 CORONARY ARTERY DISEASE INVOLVING NATIVE CORONARY ARTERY OF NATIVE HEART WITHOUT ANGINA PECTORIS: ICD-10-CM

## 2022-01-27 DIAGNOSIS — Z00.00 ROUTINE GENERAL MEDICAL EXAMINATION AT A HEALTH CARE FACILITY: Primary | ICD-10-CM

## 2022-01-27 DIAGNOSIS — M48.02 SPINAL STENOSIS IN CERVICAL REGION: ICD-10-CM

## 2022-01-27 DIAGNOSIS — E78.00 PURE HYPERCHOLESTEROLEMIA: ICD-10-CM

## 2022-01-27 DIAGNOSIS — K64.4 EXTERNAL HEMORRHOIDS: ICD-10-CM

## 2022-01-27 DIAGNOSIS — H90.3 SENSORINEURAL HEARING LOSS, BILATERAL: ICD-10-CM

## 2022-01-27 DIAGNOSIS — K59.00 CONSTIPATION, UNSPECIFIED CONSTIPATION TYPE: ICD-10-CM

## 2022-01-27 LAB
ESTIMATED AVERAGE GLUCOSE: 197.3 MG/DL
HBA1C MFR BLD: 8.5 %

## 2022-01-27 PROCEDURE — G0439 PPPS, SUBSEQ VISIT: HCPCS | Performed by: FAMILY MEDICINE

## 2022-01-27 PROCEDURE — 4040F PNEUMOC VAC/ADMIN/RCVD: CPT | Performed by: FAMILY MEDICINE

## 2022-01-27 PROCEDURE — 3052F HG A1C>EQUAL 8.0%<EQUAL 9.0%: CPT | Performed by: FAMILY MEDICINE

## 2022-01-27 PROCEDURE — 3017F COLORECTAL CA SCREEN DOC REV: CPT | Performed by: FAMILY MEDICINE

## 2022-01-27 PROCEDURE — G8484 FLU IMMUNIZE NO ADMIN: HCPCS | Performed by: FAMILY MEDICINE

## 2022-01-27 PROCEDURE — 1123F ACP DISCUSS/DSCN MKR DOCD: CPT | Performed by: FAMILY MEDICINE

## 2022-01-27 RX ORDER — APIXABAN 5 MG/1
TABLET, FILM COATED ORAL
COMMUNITY
Start: 2021-11-18 | End: 2022-04-27

## 2022-01-27 RX ORDER — LANCETS 30 GAUGE
EACH MISCELLANEOUS
Qty: 100 EACH | Refills: 5 | Status: SHIPPED | OUTPATIENT
Start: 2022-01-27

## 2022-01-27 RX ORDER — GLUCOSAMINE HCL/CHONDROITIN SU 500-400 MG
CAPSULE ORAL
Qty: 100 STRIP | Refills: 5 | Status: SHIPPED | OUTPATIENT
Start: 2022-01-27

## 2022-01-27 NOTE — PATIENT INSTRUCTIONS
Personalized Preventive Plan for Karol King - 1/27/2022  Medicare offers a range of preventive health benefits. Some of the tests and screenings are paid in full while other may be subject to a deductible, co-insurance, and/or copay. Some of these benefits include a comprehensive review of your medical history including lifestyle, illnesses that may run in your family, and various assessments and screenings as appropriate. After reviewing your medical record and screening and assessments performed today your provider may have ordered immunizations, labs, imaging, and/or referrals for you. A list of these orders (if applicable) as well as your Preventive Care list are included within your After Visit Summary for your review. Other Preventive Recommendations:    · A preventive eye exam performed by an eye specialist is recommended every 1-2 years to screen for glaucoma; cataracts, macular degeneration, and other eye disorders. · A preventive dental visit is recommended every 6 months. · Try to get at least 150 minutes of exercise per week or 10,000 steps per day on a pedometer . · Order or download the FREE \"Exercise & Physical Activity: Your Everyday Guide\" from [employer]. Call 3-859.527.7615 or search [employer] online. · You need 6633-6812 mg of calcium and 7614-3682 IU of vitamin D per day. It is possible to meet your calcium requirement with diet alone, but a vitamin D supplement is usually necessary to meet this goal.  · When exposed to the sun, use a sunscreen that protects against both UVA and UVB radiation with an SPF of 30 or greater. Reapply every 2 to 3 hours or after sweating, drying off with a towel, or swimming. · Always wear a seat belt when traveling in a car. Always wear a helmet when riding a bicycle or motorcycle.

## 2022-01-27 NOTE — PROGRESS NOTES
Medicare Annual Wellness Visit  Name: Rebeka Nelson Date: 2022   MRN: [id number] Sex: Male   Age: 70 y.o. Ethnicity: Non- / Non    : 1950 Race: White (non-)      Shira Machuca is here for Annual Exam    Screenings for behavioral, psychosocial and functional/safety risks, and cognitive dysfunction are all negative except as indicated below. These results, as well as other patient data from the [address] form, are documented in Flowsheets linked to this Encounter. HgbA1c= 8.5 which is elevated. He had a heart flutter in  and had cardioversion and ablation in . He has not been exercising as much until 21. He was walking on beach in [place] and did not feel well. He then was with a friend in [place] and has some shortness of breath while walking with a friend and drove himself home and saw cardiologist and diagnosed with atrial flutter. Alcohol - 2 beers/ day usually . Allergies   Allergen Reactions    Amoxicillin Hives and Swelling       Prior to Visit Medications    Medication Sig Taking?  Authorizing Provider   ELIQUIS 5 MG TABS tablet TAKE 1 TABLET BY MOUTH TWICE DAILY Yes Historical Provider, MD   lisinopril (PRINIVIL;ZESTRIL) 10 MG tablet TAKE 1 TABLET BY MOUTH DAILY Yes Bhavik Alvarez MD   metoprolol succinate (TOPROL XL) 25 MG extended release tablet TAKE 1 TABLET BY MOUTH DAILY Yes Bhavik Alvarez MD   atorvastatin (LIPITOR) 20 MG tablet TAKE 1 TABLET BY MOUTH DAILY Yes Bhavik Alvarez MD   aspirin 81 MG EC tablet Take 81 mg by mouth daily Yes Historical Provider, MD   famotidine (PEPCID) 20 MG tablet Take 1 tablet by mouth 2 times daily Yes Bhavik Alvarez MD   vitamin E 1000 units capsule Take 1,000 Units by mouth daily Yes Historical Provider, MD   clopidogrel (PLAVIX) 75 MG tablet Take 1 tablet by mouth daily Yes Bhavik Alvarez MD   Probiotic Product (PROBIOTIC DAILY PO) Take 1 tablet by mouth daily Yes Historical Provider, MD sildenafil (REVATIO) 20 MG tablet Take 1 - 5 pills po prn ED. Yes Carol Echevarria MD   therapeutic multivitamin-minerals [facility]) tablet Take 1 tablet by mouth daily. Yes Historical Provider, MD   Ascorbic Acid (VITAMIN C) 1000 MG tablet Take 1,000 mg by mouth daily. Yes Historical Provider, MD   vitamin B-12 (CYANOCOBALAMIN) 1000 MCG tablet Take 1,000 mcg by mouth daily. Yes Historical Provider, MD   Magnesium 500 MG TABS Take 1 tablet by mouth daily 750mg Yes Historical Provider, MD   fish oil-omega-3 fatty acids 1000 MG capsule Take 2 g by mouth daily. Yes Historical Provider, MD   Glucosamine-Chondroitin 750-600 MG TABS Take 1 tablet by mouth 2 times daily. Yes Historical Provider, MD   calcium carbonate 600 MG TABS tablet Take 1 tablet by mouth daily.    Yes Historical Provider, MD   sertraline (ZOLOFT) 50 MG tablet TAKE 1 TABLET BY MOUTH DAILY  Carol Echevarria MD       Past Medical History:   Diagnosis Date    AV block, 1st degree     CAD (coronary artery disease) 04/2011    Dr. Alo Broussard    Calculus of kidney 12/01/2010    Chondromalacia of patella     Closed nondisplaced fracture of styloid process of left radius 10/2021    DDD (degenerative disc disease), lumbar     L2-3; L4-5    DDD (degenerative disc disease), lumbar 04/2019    L1-2; L2-3, L3-4; L4-5; L5-S1    Degeneration of cervical intervertebral disc      Degenerative arthritis of left foot 07/2016    Depression and anxiety      Diabetes mellitus, type 2 ([address] 04/2019    diet controlled    ED (erectile dysfunction)      Esophageal reflux      Lumbar stenosis 02/2020    L2-3; L3-4; L4-5    Obstructive sleep apnea 2003    CPAP    Prediabetes 04/2015    Pure hypercholesterolemia      Retinal tears 1980, 1997    Spinal stenosis in cervical region      Testosterone deficiency 04/2012    Tricuspid regurgitation      Ventricular tachycardia, nonsustained        Past Surgical History:   Procedure Laterality Date    [facility] CERVICAL FUSION  12/9/2010    ACDF C7-T1with Bilateral C8 Root Decompression, Zero & Impant w/synthesis Removal of C7 screw on Left   [address]    right    KNEE SURGERY  1994    left    KNEE SURGERY  2001    right    PAIN MANAGEMENT PROCEDURE N/A 3/18/2020    BILATERAL L3 TRANSFORAMINAL  EPIDURAL STEROID INJECTION WITH FLUOROSCOPY performed by Niya Stafford MD at [address] Bilateral 5/13/2020    BILATERAL L4 TRANSFORAMINAL EPIDURAL STEROID INJECTION WITH FLUOROSCOPY performed by Niya Stafford MD at [address]  08/2016    PTCA  4 26 2011    with stent LAD    TONSILLECTOMY  1955    VASECTOMY  1989       Family History   Problem Relation Age of Onset    Heart Disease Mother     Heart Disease Father         CAD    Diabetes Father     High Blood Pressure Father     Hypertension Father     Dementia Brother 68       CareTeam (Including outside providers/suppliers regularly involved in providing care):   Patient Care Team:  Joanna Rae MD as PCP - General (Family Medicine)  Joanna Rae MD as PCP - [facility]    Wt Readings from Last 3 Encounters:   01/27/22 212 lb 3.2 oz (96.3 kg)   11/18/21 218 lb (98.9 kg)   10/27/21 218 lb (98.9 kg)     Vitals:    01/27/22 1454   BP: 136/86   Pulse: 61   Resp: 13   Temp: 97.3 °F (36.3 °C)   TempSrc: Temporal   SpO2: 99%   Weight: 212 lb 3.2 oz (96.3 kg)   Height: 5' 9.5\" (1.765 m)     Body mass index is 30.89 kg/m². Based upon direct observation of the patient, evaluation of cognition reveals recent and remote memory intact.     General Appearance: alert and oriented to person, place and time, well developed and well- nourished, in no acute distress  Skin: warm and dry, no rash or erythema  Head: normocephalic and atraumatic  Eyes: pupils equal, round, and reactive to light, extraocular eye movements intact, conjunctivae normal  ENT: tympanic membrane, external ear and ear canal normal bilaterally, nose without deformity, nasal mucosa and turbinates normal without polyps  Neck: supple and non-tender without mass, no thyromegaly or thyroid nodules, no cervical lymphadenopathy  Pulmonary/Chest: clear to auscultation bilaterally- no wheezes, rales or rhonchi, normal air movement, no respiratory distress  Cardiovascular: normal rate, regular rhythm, normal S1 and S2, no murmurs, rubs, clicks, or gallops, distal pulses intact, no carotid bruits  Abdomen: soft, non-tender, non-distended, normal bowel sounds, no masses or organomegaly  Extremities: no cyanosis, clubbing or edema  Musculoskeletal: normal range of motion, no joint swelling, deformity or tenderness  Neurologic: reflexes normal and symmetric, no cranial nerve deficit, gait, coordination and speech normal    Patient's complete Health Risk Assessment and screening values have been reviewed and are found in Flowsheets. The following problems were reviewed today and where indicated follow up appointments were made and/or referrals ordered. Positive Risk Factor Screenings with Interventions:     Fall Risk:  2 or more falls in past year?: no  Fall with injury in past year?: (!) yes  Fall Risk Interventions:    · Restart CV exercise > 150 minutes/ week.            Health Habits/Nutrition:     Body mass index: (!) 30.88  Health Habits/Nutrition Interventions:  · Inadequate physical activity:  patient agrees to exercise for at least 150 minutes/week         Personalized Preventive Plan   Current Health Maintenance Status  Immunization History   Administered Date(s) Administered    COVID-19, Pfizer Purple top, DILUTE for use, 12+ yrs, 30mcg/0.3mL dose 02/08/2021, 03/02/2021, 10/11/2021    Influenza, High Dose (Fluzone 65 yrs and older) 12/04/2018    Influenza, High-dose, Quadv, 65 yrs +, IM (Fluzone) 10/19/2020    Pneumococcal Conjugate 13-valent ([id number]) 09/26/2017    Pneumococcal Polysaccharide ([id number]) as nephropathy, neuropathy, CV disease, retinopathy and peripheral vascular disease .   -  DIABETES FOOT EXAM  - blood glucose monitor strips; Test 1 times a day & as needed for symptoms of fluctuating blood glucose. Dispense sufficient amount . Dispense: 100 strip; Refill: 5  - Lancets MISC; Test 1 times a day & as needed for symptoms of fluctuating blood glucose. Dispense sufficient amount . Dispense: 100 each; Refill: 5  - blood glucose monitor kit and supplies; Check Blood sugars and prn symptoms of fluctuating Blood sugars . Dispense: 1 kit; Refill: 0    3. Essential hypertension  - Controlled. Continue Lisinopril 10 mg qd , Metoprolol XL 25 mg qd, and low sodium diet. - Renal Function Panel; Future    4. Pure hypercholesterolemia  - Controlled. Continue Lipitor 20 mg qd and low cholesterol diet. 5. Spinal stenosis of lumbar region, unspecified whether neurogenic claudication present/ 6. DDD (degenerative disc disease), lumbar  - Stable. Continue Lipitor 20 mg qd and low cholesterol diet. 7. Coronary artery disease involving native coronary artery of native heart without angina pectoris  - Stable. Continue Lipitor 20 mg qd and Aspirin 81 mg qd. 8. Spinal stenosis in cervical region  - stable. Moist heat . ROM exercises. Modify activities. 9. Sensorineural hearing loss, bilateral  - Stable. 10. Degeneration of cervical intervertebral disc  - Stable. 11. First degree atrioventricular block  - Followed by cardiologist.     12. Gastroesophageal reflux disease without esophagitis  - Stable . Continue dietary/ lifestyle modifications and Pepcid bid prn. 13. Constipation, unspecified constipation type/ 14. External hemorrhoids  - Continue magnesium supplement and Probiotic qd. - Discussed adding fiber supplement daily - Metamucil or Citrucel qd. Follow up 3 months/ prn.

## 2022-03-15 DIAGNOSIS — E78.00 PURE HYPERCHOLESTEROLEMIA: ICD-10-CM

## 2022-03-15 RX ORDER — ATORVASTATIN CALCIUM 20 MG/1
20 TABLET, FILM COATED ORAL DAILY
Qty: 90 TABLET | Refills: 1 | Status: SHIPPED | OUTPATIENT
Start: 2022-03-15 | End: 2022-07-27 | Stop reason: SDUPTHER

## 2022-04-06 DIAGNOSIS — I10 ESSENTIAL HYPERTENSION: ICD-10-CM

## 2022-04-07 RX ORDER — LISINOPRIL 10 MG/1
10 TABLET ORAL DAILY
Qty: 90 TABLET | Refills: 0 | Status: SHIPPED | OUTPATIENT
Start: 2022-04-07 | End: 2022-07-27 | Stop reason: SDUPTHER

## 2022-04-07 NOTE — TELEPHONE ENCOUNTER
Requested Prescriptions     Pending Prescriptions Disp Refills    lisinopril (PRINIVIL;ZESTRIL) 10 MG tablet [Pharmacy Med Name: LISINOPRIL 10MG TABLETS] 90 tablet 0     Sig: TAKE 1 TABLET BY MOUTH DAILY     Last ov 1/27/22  Last lab 1/26/22   Next ov 4/27/22

## 2022-04-27 ENCOUNTER — OFFICE VISIT (OUTPATIENT)
Dept: FAMILY MEDICINE CLINIC | Age: 72
End: 2022-04-27
Payer: MEDICARE

## 2022-04-27 VITALS
RESPIRATION RATE: 17 BRPM | DIASTOLIC BLOOD PRESSURE: 70 MMHG | WEIGHT: 199.2 LBS | BODY MASS INDEX: 28.99 KG/M2 | HEART RATE: 60 BPM | SYSTOLIC BLOOD PRESSURE: 118 MMHG | OXYGEN SATURATION: 100 % | TEMPERATURE: 96.8 F

## 2022-04-27 DIAGNOSIS — I25.10 CORONARY ARTERY DISEASE INVOLVING NATIVE CORONARY ARTERY OF NATIVE HEART WITHOUT ANGINA PECTORIS: ICD-10-CM

## 2022-04-27 DIAGNOSIS — E34.9 TESTOSTERONE DEFICIENCY: ICD-10-CM

## 2022-04-27 DIAGNOSIS — K21.9 GASTROESOPHAGEAL REFLUX DISEASE WITHOUT ESOPHAGITIS: ICD-10-CM

## 2022-04-27 DIAGNOSIS — I10 ESSENTIAL HYPERTENSION: ICD-10-CM

## 2022-04-27 DIAGNOSIS — E78.00 PURE HYPERCHOLESTEROLEMIA: ICD-10-CM

## 2022-04-27 DIAGNOSIS — E11.9 TYPE 2 DIABETES MELLITUS WITHOUT COMPLICATION, WITHOUT LONG-TERM CURRENT USE OF INSULIN (HCC): Primary | ICD-10-CM

## 2022-04-27 DIAGNOSIS — M50.30 DEGENERATION OF CERVICAL INTERVERTEBRAL DISC: ICD-10-CM

## 2022-04-27 PROCEDURE — G8427 DOCREV CUR MEDS BY ELIG CLIN: HCPCS | Performed by: FAMILY MEDICINE

## 2022-04-27 PROCEDURE — 3017F COLORECTAL CA SCREEN DOC REV: CPT | Performed by: FAMILY MEDICINE

## 2022-04-27 PROCEDURE — 3052F HG A1C>EQUAL 8.0%<EQUAL 9.0%: CPT | Performed by: FAMILY MEDICINE

## 2022-04-27 PROCEDURE — 1123F ACP DISCUSS/DSCN MKR DOCD: CPT | Performed by: FAMILY MEDICINE

## 2022-04-27 PROCEDURE — 4040F PNEUMOC VAC/ADMIN/RCVD: CPT | Performed by: FAMILY MEDICINE

## 2022-04-27 PROCEDURE — G8417 CALC BMI ABV UP PARAM F/U: HCPCS | Performed by: FAMILY MEDICINE

## 2022-04-27 PROCEDURE — 99214 OFFICE O/P EST MOD 30 MIN: CPT | Performed by: FAMILY MEDICINE

## 2022-04-27 PROCEDURE — 1036F TOBACCO NON-USER: CPT | Performed by: FAMILY MEDICINE

## 2022-04-27 PROCEDURE — 2022F DILAT RTA XM EVC RTNOPTHY: CPT | Performed by: FAMILY MEDICINE

## 2022-04-27 ASSESSMENT — PATIENT HEALTH QUESTIONNAIRE - PHQ9
SUM OF ALL RESPONSES TO PHQ QUESTIONS 1-9: 0
SUM OF ALL RESPONSES TO PHQ QUESTIONS 1-9: 0
SUM OF ALL RESPONSES TO PHQ9 QUESTIONS 1 & 2: 0
1. LITTLE INTEREST OR PLEASURE IN DOING THINGS: 0
8. MOVING OR SPEAKING SO SLOWLY THAT OTHER PEOPLE COULD HAVE NOTICED. OR THE OPPOSITE, BEING SO FIGETY OR RESTLESS THAT YOU HAVE BEEN MOVING AROUND A LOT MORE THAN USUAL: 0
9. THOUGHTS THAT YOU WOULD BE BETTER OFF DEAD, OR OF HURTING YOURSELF: 0
6. FEELING BAD ABOUT YOURSELF - OR THAT YOU ARE A FAILURE OR HAVE LET YOURSELF OR YOUR FAMILY DOWN: 0
7. TROUBLE CONCENTRATING ON THINGS, SUCH AS READING THE NEWSPAPER OR WATCHING TELEVISION: 0
SUM OF ALL RESPONSES TO PHQ QUESTIONS 1-9: 0
SUM OF ALL RESPONSES TO PHQ QUESTIONS 1-9: 0
4. FEELING TIRED OR HAVING LITTLE ENERGY: 0
10. IF YOU CHECKED OFF ANY PROBLEMS, HOW DIFFICULT HAVE THESE PROBLEMS MADE IT FOR YOU TO DO YOUR WORK, TAKE CARE OF THINGS AT HOME, OR GET ALONG WITH OTHER PEOPLE: 0
5. POOR APPETITE OR OVEREATING: 0
2. FEELING DOWN, DEPRESSED OR HOPELESS: 0
3. TROUBLE FALLING OR STAYING ASLEEP: 0

## 2022-04-27 NOTE — PROGRESS NOTES
SUBJECTIVE:  70 y.o.. male  for follow up of diabetes, hypertension, and hypercholesterolemia. Diabetic Review of Systems - medication compliance: compliant most of the time, diabetic diet compliance: noncompliant some of the time, home glucose monitoring: fasting values range 120 this am .  120-160 before breakfast . , further diabetic ROS: no polyuria or polydipsia, no chest pain, dyspnea or TIA's, no numbness, tingling or pain in extremities, last eye exam approximately 3/22. Highest = 232 after over indulging , including carolyn. He has lost 13 lbs since last visit. He is driving to [place] q 5 weeks or so to visit brother and eating healthier usually. Current Outpatient Medications   Medication Sig Dispense Refill    lisinopril (PRINIVIL;ZESTRIL) 10 MG tablet TAKE 1 TABLET BY MOUTH DAILY 90 tablet 0    atorvastatin (LIPITOR) 20 MG tablet TAKE 1 TABLET BY MOUTH DAILY 90 tablet 1    blood glucose monitor strips Test 1 times a day & as needed for symptoms of fluctuating blood glucose. Dispense sufficient amount . 100 strip 5    Lancets MISC Test 1 times a day & as needed for symptoms of fluctuating blood glucose. Dispense sufficient amount . 100 each 5    blood glucose monitor kit and supplies Check Blood sugars and prn symptoms of fluctuating Blood sugars . 1 kit 0    metoprolol succinate (TOPROL XL) 25 MG extended release tablet TAKE 1 TABLET BY MOUTH DAILY 90 tablet 1    aspirin 81 MG EC tablet Take 81 mg by mouth daily      famotidine (PEPCID) 20 MG tablet Take 1 tablet by mouth 2 times daily 180 tablet 1    vitamin E 1000 units capsule Take 1,000 Units by mouth daily      Probiotic Product (PROBIOTIC DAILY PO) Take 1 tablet by mouth daily      sildenafil (REVATIO) 20 MG tablet Take 1 - 5 pills po prn ED. 90 tablet 1    therapeutic multivitamin-minerals (THERAGRAN-M) tablet Take 1 tablet by mouth daily.  Ascorbic Acid (VITAMIN C) 1000 MG tablet Take 1,000 mg by mouth daily.  vitamin B-12 (CYANOCOBALAMIN) 1000 MCG tablet Take 1,000 mcg by mouth daily.  Magnesium 500 MG TABS Take 1 tablet by mouth daily 750mg      fish oil-omega-3 fatty acids 1000 MG capsule Take 2 g by mouth daily.  Glucosamine-Chondroitin 750-600 MG TABS Take 1 tablet by mouth 2 times daily.  calcium carbonate 600 MG TABS tablet Take 1 tablet by mouth daily.  sertraline (ZOLOFT) 50 MG tablet TAKE 1 TABLET BY MOUTH DAILY 90 tablet 1     No current facility-administered medications for this visit. ROS: All other systems were reviewed and were negative . Patient's allergies and medications were reviewed. Patient's past medical, surgical, social , and family history were reviewed. Wt Readings from Last 3 Encounters:   04/27/22 199 lb 3.2 oz (90.4 kg)   01/27/22 212 lb 3.2 oz (96.3 kg)   11/18/21 218 lb (98.9 kg)       OBJECTIVE:  /70   Pulse 60   Temp 96.8 °F (36 °C)   Resp 17   Wt 199 lb 3.2 oz (90.4 kg)   SpO2 100%   BMI 28.99 kg/m²   General: NAD, cooperative, alert and oriented X 3, affect / mood is good. Good insight. well hydrated. Neck : no lymphadenopathy, supple, FROM  CV: Regular rate and rhythm , no murmurs/ rub/ gallop. No edema. Lungs : CTA bilaterally, breathing comfortably  Abdomen: positive bowel sounds, soft , non tender, non distended. No hepatosplenomegaly. Feet: normal sensation to monofilament bilaterally, no ulcers. DP/ PT pulses normal.   Skin: no rashes. Non tender. ASSESSMENT:  1. Type 2 diabetes mellitus without complication, without long-term current use of insulin ([facility])  - HgbA1c., Renal panel   - Continue dietary/ lifestyle modifications, including decreased concentrated sweets and carbohydrates. -  DIABETES FOOT EXAM    2. Essential hypertension  - Controlled. Continue Lisinopril 10 mg qd, Metoprolol XL 25 mg qd and low sodium diet.       3. Pure hypercholesterolemia  - Controlled continue Lipitor 20 mg qd and low cholesterol diet.     4. Testosterone deficiency  - stable. 5. Coronary artery disease involving native coronary artery of native heart without angina pectoris  - Continue Aspirin 81 mg qd , Metoprolol Xl 25 qd, Lipitor 20 mg qd. 6. Gastroesophageal reflux disease without esophagitis  - Stable . Continue Pepcid and dietary/ lifestyle modifications. 7. Degeneration of cervical intervertebral disc  - Stable. PLAN:  See orders for this visit as documented in the electronic medical record. Issues reviewed with her: low cholesterol diet, weight control and daily exercise discussed, home glucose monitoring emphasized, all medications, side effects and compliance discussed carefully, foot care discussed and Podiatry visits discussed, annual eye examinations at Ophthalmology discussed, glycohemoglobin and other lab monitoring discussed and long term diabetic complications discussed. Goals:   1) Blood pressure < 130/80  2) HGA1C ideal less than 6.5, at least less than 7.0  3) LDL cholesterol < 100  4) Exercise 150 minutes a week   5) Dilated eye exam by an optometrist or ophthalmologist once a year  6) Fasting blood sugar < 110  7) Glucose 2 hours after meal < 140  8) Aspirin 81 mg once a day  9) BMI (Body Mass Index) ideal < 25, at least less than 30. Weight loss of even 10 to 15 pounds is effective in improving diabetes  9) Total fat intake to less than 60 grams per day and saturated fat to less than 20 grams per day. 10) Diabetic education and diabetic nutrition classes. [phone]) Doctor visits every 3 months. Follow up 3 months/ prn.

## 2022-05-02 DIAGNOSIS — E11.9 TYPE 2 DIABETES MELLITUS WITHOUT COMPLICATION, WITHOUT LONG-TERM CURRENT USE OF INSULIN (HCC): ICD-10-CM

## 2022-05-02 DIAGNOSIS — I10 ESSENTIAL HYPERTENSION: ICD-10-CM

## 2022-05-03 LAB
ALBUMIN SERPL-MCNC: 4.5 G/DL (ref 3.4–5)
ANION GAP SERPL CALCULATED.3IONS-SCNC: 11 MMOL/L (ref 3–16)
BUN BLDV-MCNC: 14 MG/DL (ref 7–20)
CALCIUM SERPL-MCNC: 9.7 MG/DL (ref 8.3–10.6)
CHLORIDE BLD-SCNC: 102 MMOL/L (ref 99–110)
CO2: 27 MMOL/L (ref 21–32)
CREAT SERPL-MCNC: 0.8 MG/DL (ref 0.8–1.3)
ESTIMATED AVERAGE GLUCOSE: 142.7 MG/DL
GFR AFRICAN AMERICAN: >60
GFR NON-AFRICAN AMERICAN: >60
GLUCOSE BLD-MCNC: 109 MG/DL (ref 70–99)
HBA1C MFR BLD: 6.6 %
PHOSPHORUS: 3.5 MG/DL (ref 2.5–4.9)
POTASSIUM SERPL-SCNC: 4.5 MMOL/L (ref 3.5–5.1)
SODIUM BLD-SCNC: 140 MMOL/L (ref 136–145)

## 2022-05-27 ENCOUNTER — PATIENT MESSAGE (OUTPATIENT)
Dept: FAMILY MEDICINE CLINIC | Age: 72
End: 2022-05-27

## 2022-05-27 NOTE — TELEPHONE ENCOUNTER
From: Emily Sellers  To: Dr. Potts Ham: 5/27/2022 11:15 AM EDT  Subject: Groin Pain and Outer Thigh Numbness (Right Side)    Hi Dr. Hernandez Sample,   I have been experiencing groin pain ( dull) and outer thigh numbness (feels like its a sleep) for about a week. I have continued to walk 5 miles a day during this timeframe doesnt seem to bother it. Some readings say exercise is okay, some say rest.     I looked at making an appointment but your calendar is full until June 6. I will be out of town [place] 3-8    Since I have had an issue w stenosis would you recommend that I go see my back doctor and get a X-ray or a mri to see if my back is causing the symptoms.  Or should I come in to see you first.

## 2022-06-01 ENCOUNTER — OFFICE VISIT (OUTPATIENT)
Dept: FAMILY MEDICINE CLINIC | Age: 72
End: 2022-06-01
Payer: MEDICARE

## 2022-06-01 VITALS
TEMPERATURE: 96.8 F | RESPIRATION RATE: 16 BRPM | OXYGEN SATURATION: 98 % | SYSTOLIC BLOOD PRESSURE: 122 MMHG | BODY MASS INDEX: 29.9 KG/M2 | HEART RATE: 58 BPM | WEIGHT: 205.4 LBS | DIASTOLIC BLOOD PRESSURE: 62 MMHG

## 2022-06-01 DIAGNOSIS — M76.31 ILIOTIBIAL BAND SYNDROME OF RIGHT SIDE: ICD-10-CM

## 2022-06-01 DIAGNOSIS — M70.61 TROCHANTERIC BURSITIS OF RIGHT HIP: ICD-10-CM

## 2022-06-01 DIAGNOSIS — N45.1 RIGHT EPIDIDYMITIS: Primary | ICD-10-CM

## 2022-06-01 PROCEDURE — 99214 OFFICE O/P EST MOD 30 MIN: CPT | Performed by: FAMILY MEDICINE

## 2022-06-01 PROCEDURE — 3017F COLORECTAL CA SCREEN DOC REV: CPT | Performed by: FAMILY MEDICINE

## 2022-06-01 PROCEDURE — G8417 CALC BMI ABV UP PARAM F/U: HCPCS | Performed by: FAMILY MEDICINE

## 2022-06-01 PROCEDURE — G8427 DOCREV CUR MEDS BY ELIG CLIN: HCPCS | Performed by: FAMILY MEDICINE

## 2022-06-01 PROCEDURE — 1036F TOBACCO NON-USER: CPT | Performed by: FAMILY MEDICINE

## 2022-06-01 PROCEDURE — 1123F ACP DISCUSS/DSCN MKR DOCD: CPT | Performed by: FAMILY MEDICINE

## 2022-06-01 RX ORDER — LEVOFLOXACIN 500 MG/1
500 TABLET, FILM COATED ORAL DAILY
Qty: 14 TABLET | Refills: 0 | Status: SHIPPED | OUTPATIENT
Start: 2022-06-01 | End: 2022-06-13 | Stop reason: SDUPTHER

## 2022-06-01 RX ORDER — DICLOFENAC SODIUM 75 MG/1
75 TABLET, DELAYED RELEASE ORAL 2 TIMES DAILY
Qty: 60 TABLET | Refills: 0 | Status: SHIPPED | OUTPATIENT
Start: 2022-06-01 | End: 2022-07-27

## 2022-06-01 ASSESSMENT — PATIENT HEALTH QUESTIONNAIRE - PHQ9
7. TROUBLE CONCENTRATING ON THINGS, SUCH AS READING THE NEWSPAPER OR WATCHING TELEVISION: 0
10. IF YOU CHECKED OFF ANY PROBLEMS, HOW DIFFICULT HAVE THESE PROBLEMS MADE IT FOR YOU TO DO YOUR WORK, TAKE CARE OF THINGS AT HOME, OR GET ALONG WITH OTHER PEOPLE: 0
6. FEELING BAD ABOUT YOURSELF - OR THAT YOU ARE A FAILURE OR HAVE LET YOURSELF OR YOUR FAMILY DOWN: 0
3. TROUBLE FALLING OR STAYING ASLEEP: 0
4. FEELING TIRED OR HAVING LITTLE ENERGY: 0
SUM OF ALL RESPONSES TO PHQ QUESTIONS 1-9: 0
1. LITTLE INTEREST OR PLEASURE IN DOING THINGS: 0
SUM OF ALL RESPONSES TO PHQ QUESTIONS 1-9: 0
2. FEELING DOWN, DEPRESSED OR HOPELESS: 0
8. MOVING OR SPEAKING SO SLOWLY THAT OTHER PEOPLE COULD HAVE NOTICED. OR THE OPPOSITE, BEING SO FIGETY OR RESTLESS THAT YOU HAVE BEEN MOVING AROUND A LOT MORE THAN USUAL: 0
SUM OF ALL RESPONSES TO PHQ QUESTIONS 1-9: 0
SUM OF ALL RESPONSES TO PHQ9 QUESTIONS 1 & 2: 0
SUM OF ALL RESPONSES TO PHQ QUESTIONS 1-9: 0
5. POOR APPETITE OR OVEREATING: 0
9. THOUGHTS THAT YOU WOULD BE BETTER OFF DEAD, OR OF HURTING YOURSELF: 0

## 2022-06-01 NOTE — PATIENT INSTRUCTIONS
Patient Education        Iliotibial Band Syndrome: Exercises  Introduction  Here are some examples of exercises for you to try. The exercises may be suggested for a condition or for rehabilitation. Start each exercise slowly. Ease off the exercises if you start to have pain. You will be told when to start these exercises and which ones will work bestfor you. How to do the exercises  Iliotibial band stretch    1. Lean sideways against a wall. If you are not steady on your feet, hold on to a chair or counter. 2. Stand on the leg with the affected hip, with that leg close to the wall. Then cross your other leg in front of it. 3. Let your affected hip drop out to the side of your body and against the wall. Then lean away from your affected hip until you feel a stretch. 4. Hold the stretch for 15 to 30 seconds. 5. Repeat 2 to 4 times. Piriformis stretch    1. Lie on your back with your legs straight. 2. Lift your affected leg and bend your knee. With your opposite hand, reach across your body, and then gently pull your knee toward your opposite shoulder. 3. Hold the stretch for 15 to 30 seconds. 4. Repeat 2 to 4 times. Hamstring wall stretch    1. Lie on your back in a doorway, with your good leg through the open door. 2. Slide your affected leg up the wall to straighten your knee. You should feel a gentle stretch down the back of your leg. 3. Hold the stretch for at least 1 minute to begin. Then try to lengthen the time you hold the stretch to as long as 6 minutes. 4. Repeat 2 to 4 times. 5. If you do not have a place to do this exercise in a doorway, there is another way to do it:  6. Lie on your back, and bend the knee of your affected leg. 7. Loop a towel under the ball and toes of that foot, and hold the ends of the towel in your hands. 8. Straighten your knee, and slowly pull back on the towel. You should feel a gentle stretch down the back of your leg. 9. Hold the stretch for 15 to 30 seconds. Or even better, hold the stretch for 1 minute if you can. 10. Repeat 2 to 4 times. 1. Do not arch your back. 2. Do not bend either knee. 3. Keep one heel touching the floor and the other heel touching the wall. Do not point your toes. Follow-up care is a key part of your treatment and safety. Be sure to make and go to all appointments, and call your doctor if you are having problems. It's also a good idea to know your test results and keep alist of the medicines you take. Where can you learn more? Go to https://Genbookpepiceweb.TopChalks. org and sign in to your [employer] account. Enter P252 in the Adknowledge box to learn more about \"Iliotibial Band Syndrome: Exercises. \"     If you do not have an account, please click on the \"Sign Up Now\" link. Current as of: July 1, 2021               Content Version: 13.2  © 2006-2022 [employer]. Care instructions adapted under license by [facility] ([facility]). If you have questions about a medical condition or this instruction, always ask your healthcare professional. [place] [address] any warranty or liability for your use of this information. Patient Education        Hip Bursitis: Exercises  Introduction  Here are some examples of exercises for you to try. The exercises may be suggested for a condition or for rehabilitation. Start each exercise slowly. Ease off the exercises if you start to have pain. You will be told when to start these exercises and which ones will work bestfor you. How to do the exercises  Hip rotator stretch    1. Lie on your back with both knees bent and your feet flat on the floor. 2. Put the ankle of your affected leg on your opposite thigh near your knee. 3. Use your hand to gently push your knee away from your body until you feel a gentle stretch around your hip. 4. Hold the stretch for 15 to 30 seconds. 5. Repeat 2 to 4 times.   6. Repeat steps 1 through 5, but this time use your hand to gently pull your knee toward your opposite shoulder. Iliotibial band stretch    1. Lean sideways against a wall. If you are not steady on your feet, hold on to a chair or counter. 2. Stand on the leg with the affected hip, with that leg close to the wall. Then cross your other leg in front of it. 3. Let your affected hip drop out to the side of your body and against wall. Then lean away from your affected hip until you feel a stretch. 4. Hold the stretch for 15 to 30 seconds. 5. Repeat 2 to 4 times. Straight-leg raises to the outside    1. Lie on your side, with your affected hip on top. 2. Tighten the front thigh muscles of your top leg to keep your knee straight. 3. Keep your hip and your leg straight in line with the rest of your body, and keep your knee pointing forward. Do not drop your hip back. 4. Lift your top leg straight up toward the ceiling, about 12 inches off the floor. Hold for about 6 seconds, then slowly lower your leg. 5. Repeat 8 to 12 times. Clamshell    1. Lie on your side, with your affected hip on top and your head propped on a pillow. Keep your feet and knees together and your knees bent. 2. Raise your top knee, but keep your feet together. Do not let your hips roll back. Your legs should open up like a clamshell. 3. Hold for 6 seconds. 4. Slowly lower your knee back down. Rest for 10 seconds. 5. Repeat 8 to 12 times. Follow-up care is a key part of your treatment and safety. Be sure to make and go to all appointments, and call your doctor if you are having problems. It's also a good idea to know your test results and keep alist of the medicines you take. Where can you learn more? Go to https://TapIn.tvpeCarsabi.Refinder by Gnowsis. org and sign in to your [employer] account. Enter E932 in the [employer] box to learn more about \"Hip Bursitis: Exercises. \"     If you do not have an account, please click on the \"Sign Up Now\" link.   Current as of: July 1, 2021               Content Version: 13.2  © 2330-5779 [employer]. Care instructions adapted under license by [facility] ([facility]). If you have questions about a medical condition or this instruction, always ask your healthcare professional. [address] any warranty or liability for your use of this information.

## 2022-06-01 NOTE — PROGRESS NOTES
Patient is here for groin pain on right side X 2 weeks. Initially started with numbness going down leg- lateral thigh and right groin pain started the next day . Some mild low back pain . no weakness to legs. No urinary frequency or dysuria. Sleeping okay and able to sleep through the night usually. No fever. No change over the past 1 week. He stopped walking for 1 week and did not change it at all. .     Review of Systems    ROS: All other systems were reviewed and are negative . Patient's allergies and medications were reviewed. Patient's past medical, surgical, social , and family history were reviewed. OBJECTIVE:  /62   Pulse 58   Temp 96.8 °F (36 °C)   Resp 16   Wt 205 lb 6.4 oz (93.2 kg)   SpO2 98%   BMI 29.90 kg/m²     Physical Exam    General: NAD, cooperative, alert and oriented X 3. Mood / affect is good. good insight. well hydrated. Neck : no lymphadenopathy, supple, FROM  CV: Regular rate and rhythm , no murmurs/ rub/ gallop. No edema. Lungs : CTA bilaterally, breathing comfortably  Abdomen: positive bowel sounds, soft , non tender, non distended. No hepatosplenomegaly. No CVA tenderness. : Genitals normal; both testes normal. Right superior testicular tenderness, no masses, hydroceles, varicoceles, erythema or swelling. Shaft normal, circumcised, meatus normal without discharge. No inguinal hernia noted. No inguinal lymphadenopathy. Lower extremities : DTRs 2+ knees and ankles bilateral.  FROM. Strength 5/5. Negative straight leg-raise. No edema or erythema bilateral. Tenderness to ITB - right. Tenderness to hips bilateral - inferior to greater trochanter right > left. Skin: no rashes. Non tender. ASSESSMENT/  PLAN:  1. Right epididymitis  - Moist heat . ROM exercises. Modify activities. Avoid caffeine/ alcohol.  - levoFLOXacin (LEVAQUIN) 500 MG tablet; Take 1 tablet by mouth daily for 14 days  Dispense: 14 tablet; Refill: 0    2.  Iliotibial band syndrome of right side  - Moist heat . ROM exercises- handout given. Modify activities. - diclofenac (VOLTAREN) 75 MG EC tablet; Take 1 tablet by mouth 2 times daily Prn right groin pain  Dispense: 60 tablet; Refill: 0    3. Trochanteric bursitis of right hip  - Moist heat . ROM exercises- handout given. Modify activities. - diclofenac (VOLTAREN) 75 MG EC tablet; Take 1 tablet by mouth 2 times daily Prn right groin pain  Dispense: 60 tablet; Refill: 0      F/u if no improvement 10-14d/ prn increased symptoms.

## 2022-07-25 ENCOUNTER — TELEPHONE (OUTPATIENT)
Dept: FAMILY MEDICINE CLINIC | Age: 72
End: 2022-07-25

## 2022-07-25 DIAGNOSIS — E78.00 PURE HYPERCHOLESTEROLEMIA: ICD-10-CM

## 2022-07-25 DIAGNOSIS — I25.10 CORONARY ARTERY DISEASE INVOLVING NATIVE CORONARY ARTERY OF NATIVE HEART WITHOUT ANGINA PECTORIS: ICD-10-CM

## 2022-07-25 DIAGNOSIS — E34.9 TESTOSTERONE DEFICIENCY: ICD-10-CM

## 2022-07-25 DIAGNOSIS — Z12.5 PROSTATE CANCER SCREENING: ICD-10-CM

## 2022-07-25 DIAGNOSIS — I10 ESSENTIAL HYPERTENSION: ICD-10-CM

## 2022-07-25 DIAGNOSIS — E11.9 TYPE 2 DIABETES MELLITUS WITHOUT COMPLICATION, WITHOUT LONG-TERM CURRENT USE OF INSULIN (HCC): Primary | ICD-10-CM

## 2022-07-25 NOTE — TELEPHONE ENCOUNTER
----- Message from [employer] sent at 7/25/2022 12:59 PM EDT -----  Subject: Message to Provider    QUESTIONS  Information for Provider? Patient is currently at lab and has no lab   orders for glucose or any other lab testing. He thought is was to be done   prior to his appointment 7/25.   ---------------------------------------------------------------------------  --------------  [address] [employer] [facility]  [phone]; OK to leave message on voicemail  ---------------------------------------------------------------------------  --------------  SCRIPT ANSWERS  Relationship to Patient?  Self

## 2022-07-26 DIAGNOSIS — E34.9 TESTOSTERONE DEFICIENCY: ICD-10-CM

## 2022-07-26 DIAGNOSIS — I10 ESSENTIAL HYPERTENSION: ICD-10-CM

## 2022-07-26 DIAGNOSIS — I25.10 CORONARY ARTERY DISEASE INVOLVING NATIVE CORONARY ARTERY OF NATIVE HEART WITHOUT ANGINA PECTORIS: ICD-10-CM

## 2022-07-26 DIAGNOSIS — E78.00 PURE HYPERCHOLESTEROLEMIA: ICD-10-CM

## 2022-07-26 DIAGNOSIS — E11.9 TYPE 2 DIABETES MELLITUS WITHOUT COMPLICATION, WITHOUT LONG-TERM CURRENT USE OF INSULIN (HCC): ICD-10-CM

## 2022-07-26 LAB
A/G RATIO: 2 (ref 1.1–2.2)
ALBUMIN SERPL-MCNC: 4.5 G/DL (ref 3.4–5)
ALP BLD-CCNC: 72 U/L (ref 40–129)
ALT SERPL-CCNC: 28 U/L (ref 10–40)
ANION GAP SERPL CALCULATED.3IONS-SCNC: 11 MMOL/L (ref 3–16)
AST SERPL-CCNC: 27 U/L (ref 15–37)
BILIRUB SERPL-MCNC: 1.5 MG/DL (ref 0–1)
BUN BLDV-MCNC: 20 MG/DL (ref 7–20)
CALCIUM SERPL-MCNC: 10 MG/DL (ref 8.3–10.6)
CHLORIDE BLD-SCNC: 103 MMOL/L (ref 99–110)
CHOLESTEROL, TOTAL: 160 MG/DL (ref 0–199)
CO2: 28 MMOL/L (ref 21–32)
CREAT SERPL-MCNC: 1 MG/DL (ref 0.8–1.3)
GFR AFRICAN AMERICAN: >60
GFR NON-AFRICAN AMERICAN: >60
GLUCOSE BLD-MCNC: 114 MG/DL (ref 70–99)
HDLC SERPL-MCNC: 41 MG/DL (ref 40–60)
LDL CHOLESTEROL CALCULATED: 103 MG/DL
POTASSIUM SERPL-SCNC: 4.2 MMOL/L (ref 3.5–5.1)
SODIUM BLD-SCNC: 142 MMOL/L (ref 136–145)
TOTAL PROTEIN: 6.8 G/DL (ref 6.4–8.2)
TRIGL SERPL-MCNC: 80 MG/DL (ref 0–150)
VLDLC SERPL CALC-MCNC: 16 MG/DL

## 2022-07-27 ENCOUNTER — OFFICE VISIT (OUTPATIENT)
Dept: FAMILY MEDICINE CLINIC | Age: 72
End: 2022-07-27
Payer: MEDICARE

## 2022-07-27 VITALS
BODY MASS INDEX: 29.2 KG/M2 | DIASTOLIC BLOOD PRESSURE: 80 MMHG | OXYGEN SATURATION: 99 % | TEMPERATURE: 97.2 F | HEART RATE: 50 BPM | RESPIRATION RATE: 18 BRPM | SYSTOLIC BLOOD PRESSURE: 132 MMHG | WEIGHT: 200.6 LBS

## 2022-07-27 DIAGNOSIS — I25.10 CORONARY ARTERY DISEASE INVOLVING NATIVE CORONARY ARTERY OF NATIVE HEART WITHOUT ANGINA PECTORIS: ICD-10-CM

## 2022-07-27 DIAGNOSIS — I10 ESSENTIAL HYPERTENSION: ICD-10-CM

## 2022-07-27 DIAGNOSIS — E34.9 TESTOSTERONE DEFICIENCY: ICD-10-CM

## 2022-07-27 DIAGNOSIS — Z23 NEED FOR SHINGLES VACCINE: ICD-10-CM

## 2022-07-27 DIAGNOSIS — M50.30 DEGENERATION OF CERVICAL INTERVERTEBRAL DISC: ICD-10-CM

## 2022-07-27 DIAGNOSIS — G47.33 OBSTRUCTIVE SLEEP APNEA: ICD-10-CM

## 2022-07-27 DIAGNOSIS — E11.9 TYPE 2 DIABETES MELLITUS WITHOUT COMPLICATION, WITHOUT LONG-TERM CURRENT USE OF INSULIN (HCC): Primary | ICD-10-CM

## 2022-07-27 DIAGNOSIS — M51.36 DDD (DEGENERATIVE DISC DISEASE), LUMBAR: ICD-10-CM

## 2022-07-27 DIAGNOSIS — E78.00 PURE HYPERCHOLESTEROLEMIA: ICD-10-CM

## 2022-07-27 DIAGNOSIS — M48.02 SPINAL STENOSIS IN CERVICAL REGION: ICD-10-CM

## 2022-07-27 LAB
ESTIMATED AVERAGE GLUCOSE: 148.5 MG/DL
HBA1C MFR BLD: 6.8 %

## 2022-07-27 PROCEDURE — 3044F HG A1C LEVEL LT 7.0%: CPT | Performed by: FAMILY MEDICINE

## 2022-07-27 PROCEDURE — 1123F ACP DISCUSS/DSCN MKR DOCD: CPT | Performed by: FAMILY MEDICINE

## 2022-07-27 PROCEDURE — 99214 OFFICE O/P EST MOD 30 MIN: CPT | Performed by: FAMILY MEDICINE

## 2022-07-27 RX ORDER — LISINOPRIL 10 MG/1
10 TABLET ORAL DAILY
Qty: 90 TABLET | Refills: 1 | Status: SHIPPED | OUTPATIENT
Start: 2022-07-27

## 2022-07-27 RX ORDER — ATORVASTATIN CALCIUM 20 MG/1
20 TABLET, FILM COATED ORAL DAILY
Qty: 90 TABLET | Refills: 1 | Status: SHIPPED | OUTPATIENT
Start: 2022-07-27

## 2022-07-27 RX ORDER — METOPROLOL SUCCINATE 25 MG/1
25 TABLET, EXTENDED RELEASE ORAL DAILY
Qty: 90 TABLET | Refills: 1 | Status: SHIPPED | OUTPATIENT
Start: 2022-07-27

## 2022-07-27 SDOH — ECONOMIC STABILITY: FOOD INSECURITY: WITHIN THE PAST 12 MONTHS, THE FOOD YOU BOUGHT JUST DIDN'T LAST AND YOU DIDN'T HAVE MONEY TO GET MORE.: NEVER TRUE

## 2022-07-27 SDOH — ECONOMIC STABILITY: FOOD INSECURITY: WITHIN THE PAST 12 MONTHS, YOU WORRIED THAT YOUR FOOD WOULD RUN OUT BEFORE YOU GOT MONEY TO BUY MORE.: NEVER TRUE

## 2022-07-27 ASSESSMENT — SOCIAL DETERMINANTS OF HEALTH (SDOH): HOW HARD IS IT FOR YOU TO PAY FOR THE VERY BASICS LIKE FOOD, HOUSING, MEDICAL CARE, AND HEATING?: NOT HARD AT ALL

## 2022-07-27 NOTE — PROGRESS NOTES
SUBJECTIVE:  70 y.o.. male  for follow up of diabetes, hypertension, and hypercholesterolemia. Diabetic Review of Systems - medication compliance: compliant most of the time, diabetic diet compliance: noncompliant some of the time, home glucose monitoring: fasting values range 110-140s, further diabetic ROS: no polyuria or polydipsia, no chest pain, dyspnea or TIA's, no numbness, tingling or pain in extremities, last eye exam approximately 2022- Dr. Boston Vizcarra. He is still taking care of his brother in [place] and goes q 5-6 weeks . 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 Current Outpatient Medications   Medication Sig Dispense Refill    diclofenac (VOLTAREN) 75 MG EC tablet Take 1 tablet by mouth 2 times daily Prn right groin pain 60 tablet 0    lisinopril (PRINIVIL;ZESTRIL) 10 MG tablet TAKE 1 TABLET BY MOUTH DAILY 90 tablet 0    atorvastatin (LIPITOR) 20 MG tablet TAKE 1 TABLET BY MOUTH DAILY 90 tablet 1    blood glucose monitor strips Test 1 times a day & as needed for symptoms of fluctuating blood glucose. Dispense sufficient amount . 100 strip 5    Lancets MISC Test 1 times a day & as needed for symptoms of fluctuating blood glucose. Dispense sufficient amount . 100 each 5    blood glucose monitor kit and supplies Check Blood sugars and prn symptoms of fluctuating Blood sugars . 1 kit 0    metoprolol succinate (TOPROL XL) 25 MG extended release tablet TAKE 1 TABLET BY MOUTH DAILY 90 tablet 1    aspirin 81 MG EC tablet Take 81 mg by mouth daily      famotidine (PEPCID) 20 MG tablet Take 1 tablet by mouth 2 times daily 180 tablet 1    vitamin E 1000 units capsule Take 1,000 Units by mouth daily      Probiotic Product (PROBIOTIC DAILY PO) Take 1 tablet by mouth daily      sildenafil (REVATIO) 20 MG tablet Take 1 - 5 pills po prn ED. 90 tablet 1    therapeutic multivitamin-minerals (THERAGRAN-M) tablet Take 1 tablet by mouth daily.         Ascorbic Acid (VITAMIN C) 1000 MG tablet Take 1,000 mg by mouth daily. vitamin B-12 (CYANOCOBALAMIN) 1000 MCG tablet Take 1,000 mcg by mouth daily. Magnesium 500 MG TABS Take 1 tablet by mouth daily 750mg      fish oil-omega-3 fatty acids 1000 MG capsule Take 2 g by mouth daily. Glucosamine-Chondroitin 750-600 MG TABS Take 1 tablet by mouth 2 times daily. calcium carbonate 600 MG TABS tablet Take 1 tablet by mouth daily. sertraline (ZOLOFT) 50 MG tablet TAKE 1 TABLET BY MOUTH DAILY 90 tablet 1     No current facility-administered medications for this visit. ROS: All other systems were reviewed and were negative . Patient's allergies and medications were reviewed. Patient's past medical, surgical, social , and family history were reviewed. Wt Readings from Last 3 Encounters:   07/27/22 200 lb 9.6 oz (91 kg)   06/01/22 205 lb 6.4 oz (93.2 kg)   04/27/22 199 lb 3.2 oz (90.4 kg)       OBJECTIVE:  /80   Pulse 50   Temp 97.2 °F (36.2 °C)   Resp 18   Wt 200 lb 9.6 oz (91 kg)   SpO2 99%   BMI 29.20 kg/m²   General: NAD, cooperative, alert and oriented X 3, affect / mood is good. Good insight. well hydrated. Neck : no lymphadenopathy, supple, FROM  CV: Regular rate and rhythm , no murmurs/ rub/ gallop. No edema. Lungs : CTA bilaterally, breathing comfortably  Abdomen: positive bowel sounds, soft , non tender, non distended. No hepatosplenomegaly. Feet: normal sensation to monofilament bilaterally, no ulcers. DP/ PT pulses normal.   Skin: no rashes. Non tender. ASSESSMENT:  1. Type 2 diabetes mellitus without complication, without long-term current use of insulin ([facility])  - HgbA1c= 6.6 in 5/22. Continue dietary/ lifestyle modifications, including decreased concentrated sweets and carbohydrates. - Comprehensive Metabolic Panel; Future  - Hemoglobin A1C; Future  - Microalbumin / Creatinine Urine Ratio; Future  -  DIABETES FOOT EXAM    2. Essential hypertension  - Controlled.   Continue Lisinopril 10 mg qd and Metoprolol XL 25 mg qd and low sodium diet. - lisinopril (PRINIVIL;ZESTRIL) 10 MG tablet; Take 1 tablet by mouth in the morning. Dispense: 90 tablet; Refill: 1  - metoprolol succinate (TOPROL XL) 25 MG extended release tablet; Take 1 tablet by mouth in the morning. Dispense: 90 tablet; Refill: 1  - Comprehensive Metabolic Panel; Future    3. Pure hypercholesterolemia  - Controlled. Continue Lipitor 20 mg qd and low cholesterol diet. - atorvastatin (LIPITOR) 20 MG tablet; Take 1 tablet by mouth in the morning. Dispense: 90 tablet; Refill: 1  - Comprehensive Metabolic Panel; Future  - Lipid Panel; Future    4. Coronary artery disease involving native coronary artery of native heart without angina pectoris  - Stable. Continue Aspirin 81 mg qd, Metoprolol XL 25 mg qd and Lipitor 20 mg qd.     5. Obstructive sleep apnea  - Stable. 6. Degeneration of cervical intervertebral disc/ 7. Spinal stenosis in cervical region  - Stable. Moist heat . ROM exercises. Modify activities. 8. DDD (degenerative disc disease), lumbar  - Stable. Moist heat . ROM exercises. Modify activities. 9. Testosterone deficiency  - Stable. - Testosterone, free, total; Future  - Testosterone; Future    10. Need for shingles vaccine  - Recommended Shingrix series. PLAN:  See orders for this visit as documented in the electronic medical record. Issues reviewed with her: low cholesterol diet, weight control and daily exercise discussed, home glucose monitoring emphasized, all medications, side effects and compliance discussed carefully, foot care discussed and Podiatry visits discussed, annual eye examinations at Ophthalmology discussed, glycohemoglobin and other lab monitoring discussed, and long term diabetic complications discussed.     Goals:   1) Blood pressure < 130/80  2) HGA1C ideal less than 6.5, at least less than 7.0  3) LDL cholesterol < 100  4) Exercise 150 minutes a week   5) Dilated eye exam by an optometrist or ophthalmologist once a year  6) Fasting blood sugar < 110  7) Glucose 2 hours after meal < 140  8) Aspirin 81 mg once a day  9) BMI (Body Mass Index) ideal < 25, at least less than 30. Weight loss of even 10 to 15 pounds is effective in improving diabetes  9) Total fat intake to less than 60 grams per day and saturated fat to less than 20 grams per day. 10) Diabetic education and diabetic nutrition classes. [phone]) Doctor visits every 3 months. Follow up 3 months/ prn.

## 2022-07-28 LAB
SEX HORMONE BINDING GLOBULIN: 17 NMOL/L (ref 11–80)
TESTOSTERONE FREE-NONMALE: 83.6 PG/ML (ref 47–244)
TESTOSTERONE TOTAL: 306 NG/DL (ref 220–1000)

## 2022-08-05 ENCOUNTER — TELEPHONE (OUTPATIENT)
Dept: FAMILY MEDICINE CLINIC | Age: 72
End: 2022-08-05

## 2022-08-05 DIAGNOSIS — K64.4 EXTERNAL HEMORRHOIDS: Primary | ICD-10-CM

## 2022-08-05 NOTE — TELEPHONE ENCOUNTER
Patient called stating that he's out of town and he has been in pain from his hemrrhoids for about  x3 days patient has taken preparation h [facility] want to know if he can get something for pain patient cant be reached [address] [phone]     Last ov : 07/27/2022  Last Lab :07/27/2022

## 2022-08-08 NOTE — TELEPHONE ENCOUNTER
Advise dot try sitz baths and Anusol HC suppositories which are over the counter , but RX can be sent if needed. Also advise to increase fluids - water and take fiber supplement once per day ) Citrucel or Metamucil). To ED or Urgent Care for evaluation if pain is excruciating as hemorrhoid may need lanced.

## 2022-08-09 NOTE — TELEPHONE ENCOUNTER
Patient advised   He also wants to know if you could refer him to someone to go see regarding this when he gets home.   Please advise:

## 2022-08-30 ENCOUNTER — TELEPHONE (OUTPATIENT)
Dept: SURGERY | Age: 72
End: 2022-08-30

## 2022-08-30 ENCOUNTER — OFFICE VISIT (OUTPATIENT)
Dept: SURGERY | Age: 72
End: 2022-08-30
Payer: MEDICARE

## 2022-08-30 VITALS
DIASTOLIC BLOOD PRESSURE: 77 MMHG | SYSTOLIC BLOOD PRESSURE: 139 MMHG | TEMPERATURE: 97.2 F | BODY MASS INDEX: 27.77 KG/M2 | WEIGHT: 194 LBS | OXYGEN SATURATION: 100 % | HEIGHT: 70 IN | HEART RATE: 50 BPM

## 2022-08-30 DIAGNOSIS — K60.2 ANAL FISSURE: Primary | ICD-10-CM

## 2022-08-30 PROCEDURE — 1123F ACP DISCUSS/DSCN MKR DOCD: CPT | Performed by: SURGERY

## 2022-08-30 PROCEDURE — G8427 DOCREV CUR MEDS BY ELIG CLIN: HCPCS | Performed by: SURGERY

## 2022-08-30 PROCEDURE — 1036F TOBACCO NON-USER: CPT | Performed by: SURGERY

## 2022-08-30 PROCEDURE — 99204 OFFICE O/P NEW MOD 45 MIN: CPT | Performed by: SURGERY

## 2022-08-30 PROCEDURE — 3017F COLORECTAL CA SCREEN DOC REV: CPT | Performed by: SURGERY

## 2022-08-30 PROCEDURE — G8417 CALC BMI ABV UP PARAM F/U: HCPCS | Performed by: SURGERY

## 2022-08-30 NOTE — PATIENT INSTRUCTIONS
Anal Fissure: Information and Care Instructions        An anal fissure is a tear in the lining of the anus. It typically causes intense, sharp pain and a small amount of bleeding. You may notice bright red blood on the toilet paper after you wipe. A fissure may form if you are constipated and try to pass a large, hard stool, or have excessive diarrhea. Some fissures form despite regular, soft stools. Some are due to trauma. Rarely, fissures can be a sign of other diseases such as Crohn's disease, infections, or cancer. In 50% of patients, anal fissure will heal by addressing the underlying problem and avoiding additional hard stool and constipation. See below for recommendations. In the other 50% of patients, anal fissures are resistant to healing due to spasm (abnormal tightening) of the internal sphincter muscle. This part of the anal muscles is under automatic control, so you may not feel the spasm. Most treatments attempt to break the cycle of spasm and pain by relaxing the internal sphincter muscle. This can be done with medication, Botox injection, and occasionally with surgery. Anal fissures themselves do not lead to cancer or other serious illnesses, however undiagnosed rectal bleeding can be a sign of other problems in the colon and rectum, such as hemorrhoids, infections, polyps, or even cancers. If bleeding is excessive, mixed with stool, or ongoing after healing of the fissure, or you have a family history of cancer, colonoscopy may be recommended. How to treat constipation and avoid straining:  Avoid constipation:  Include fruits, vegetables, beans, and whole grains in your diet each day. These foods are high in fiber. Take a fiber supplement, such as Benefiber, Citrucel, or Metamucil, every day. Read and follow all instructions on the label. Drink plenty of fluids, enough so that your urine is light yellow or clear like water.  If you have kidney, heart, or liver disease and have to limit fluids, talk with your doctor before you increase the amount of fluids you drink. Miralax or colace can be helpful to take as needed for constipation. Read and follow all instructions on the label. Use the toilet when only when you feel the urge. Do not strain when having a bowel movement. Do not sit on the toilet too long, play games on your cell phone, or read while on the commode. Get some exercise every day. Build up slowly to 30 to 60 minutes a day on 5 or more days of the week. Support your feet with a small step stool when you sit on the toilet. This helps flex your hips and places your pelvis in a squatting position. Most over-the-counter ointments and creams are not helpful, and can even cause damage to the skin  Use baby wipes instead of toilet paper to clean after a bowel movement. These products do not irritate the anus. Sit in a few inches of warm water (sitz bath) 3 times a day and after bowel movements. The warm water helps ease discomfort. Do not put soaps, salts, or shampoos in the water. Be sure to follow up in the office as instructed  Typically you will have a follow up appointment scheduled in 4-6 weeks to reassess your symptoms. If not, please call the office to schedule this. You may need to be seen sooner if you have fever, chills, excessive bleeding, increasing pain, inability to urinate, or changes in appetite. Please call the office at [phone] with any questions or concerns  If it is outside of normal hours and you have any concerns, please go to your nearest emergency room. What other options are available to treat fissures if I continue to have symptoms:  Special ointments can be prescribed to help relax the muscle spasm. Typically, these need to be compounded (mixed) at a special pharmacy. A pea-sized amount should be used around (not inside) the anus twice daily.   Botox injections of the sphincter can be performed, which will provide spasm relief for 1-2 months. Occasionally this needs to be repeated. This is typically performed as a same day surgery with anesthesia/sedation. Internal sphincterotomy is a surgery in which a portion of the internal sphincter muscle is cut, to relieve the spasm. This procedure has a high success rate, but a higher risk of complications, including rarely a loss of bowel control (incontinence). This is typically performed as a same day surgery with anesthesia/sedation. Fissurectomy and dermal advancement flap is a surgery in which healthy skin flaps are brought in from around the anus to cover the fissure and promote healing. This surgery is a bit more complex and sometimes require an overnight stay in the hospital.  The decision of which treatment is right for you depends on the chronicity and severity of your symptoms, age, gender, previous anorectal and other surgeries, underlying bowel control issues, and any suspicion for cancer or other diseases. Colonoscopy may be recommended at some point in your care if you have not had one recently or bleeding continues after the fissure heals    Please talk to your colorectal surgeon about any health conditions or concerns you may have regarding your anal fissure treatment.

## 2022-08-30 NOTE — PROGRESS NOTES
[address] [facility] COLORECTAL SURGERY  [address]  Dept: [phone]  Dept Fax: [phone]  Loc: [phone]    Visit Date: 8/30/2022    Nish Jules is a 70 y.o. male who presents today for: New Patient (hemorrhoids)      HPI:       Nish Jules is a 70 y.o. male referred by Dr. Allen Danielle for anorectal discomfort. Patient has had 1 month of anorectal pain and discomfort. Symptoms occur after BMs, when wlkaing. Bleeding: yes  Constipation: yes  Patient has tried: OTC hemorrhoids, supposit  Previous similar symptoms: no  Previous anorectal surgeries: no    Denies fever, chills, abd pain, nausea, emesis, weight loss. Patient's problem list, medications, past medical, surgical, family, and social histories were reviewed and updated in the chart as indicated today.     Past Medical History:   Diagnosis Date    AV block, 1st degree     CAD (coronary artery disease) 04/2011    Dr. Ruth Garrett    Calculus of kidney 12/01/2010    Chondromalacia of patella     Closed nondisplaced fracture of styloid process of left radius 10/2021    DDD (degenerative disc disease), lumbar     L2-3; L4-5    DDD (degenerative disc disease), lumbar 04/2019    L1-2; L2-3, L3-4; L4-5; L5-S1    Degeneration of cervical intervertebral disc      Degenerative arthritis of left foot 07/2016    Depression and anxiety      Diabetes mellitus, type 2 ([address] 04/2019    diet controlled    ED (erectile dysfunction)      Esophageal reflux      Lumbar stenosis 02/2020    L2-3; L3-4; L4-5    Obstructive sleep apnea 2003    CPAP    Prediabetes 04/2015    Pure hypercholesterolemia      Retinal tears 1980, 1997    Spinal stenosis in cervical region      Testosterone deficiency 04/2012    Tricuspid regurgitation      Ventricular tachycardia, nonsustained        Past Surgical History:   Procedure Laterality Date    APPENDECTOMY  1958    CERVICAL FUSION  12/9/2010 ACDF C7-T1with Bilateral C8 Root Decompression, Zero & Impant w/synthesis Removal of C7 screw on Left    [facility]    right    KNEE SURGERY  1994    left    KNEE SURGERY  2001    right    PAIN MANAGEMENT PROCEDURE N/A 3/18/2020    BILATERAL L3 TRANSFORAMINAL  EPIDURAL STEROID INJECTION WITH FLUOROSCOPY performed by Joan Ng MD at [address] Bilateral 5/13/2020    BILATERAL L4 TRANSFORAMINAL EPIDURAL STEROID INJECTION WITH FLUOROSCOPY performed by Joan Ng MD at [address]  08/2016    PTCA  4 26 2011    with stent LAD    [address]       Family History: Family history of colorectal cancer/polyps: no    Social History:   Social History     Tobacco Use    Smoking status: Never    Smokeless tobacco: Never   Substance Use Topics    Alcohol use: Yes     Alcohol/week: 3.0 - 6.0 standard drinks     Types: 3 - 6 Cans of beer per week     Comment: weekly      Tobacco cessation counseling provided as appropriate. REVIEW OF SYSTEMS:    Pertinent positives and negatives are mentioned in the HPI above. Otherwise, all other systems were reviewed and negative. Objective:     Physical Exam   /77   Pulse 50   Temp 97.2 °F (36.2 °C) (Infrared)   Ht 5' 9.5\" (1.765 m)   Wt 194 lb (88 kg)   SpO2 100%   BMI 28.24 kg/m²   Constitutional: Appears well-developed and well-nourished. Grooming appropriate. No gross deformities. Body mass index is 28.24 kg/m². Eyes: No scleral icterus. Conjunctiva/lids normal. Vision intact grossly. Pupils equal/symmetric, reactive bilaterally. ENT: External ears/nose without defect, scars, or masses. Hearing grossly intact. No facial deformity. Lips normal, normal dentition. Neck: No masses. Trachea midline. No crepitus. Thyroid not enlarged. Cardiovascular: Normal rate. No peripheral edema. Abdominal aorta normal size to palpation.   Pulmonary/Chest: Effort normal. No respiratory distress. No wheezes. No use of accessory muscles. Musculoskeletal: Normal range of motion x all 4 extremities and head/neck, without deformity, pain, or crepitus, with normal strength and tone. Normal gait. Nails without clubbing or cyanosis. Neurological: Alert and oriented to person, place, and time. No gross deficits. Sensation intact. Skin: Skin is dry. No rashes noted. No pallor. No induration of nodules. Psychiatric: Normal mood and affect. Behavior normal. Oriented to person, place, and time. Judgment and insight reasonable. Abdominal/wound: soft, nontende    RECTAL EXAM:    Chaperone/MA present in room during entire exam. Patient was placed in lateral or knee-chest positioning depending on comfort. Exam table manipulated for proper visualization and lighting. Buttocks spread. Inspection reveals: posterior  Midline fissure confirmed by cotton tip swab palpation    Digital exam and anoscopy deferred due to acute pain      Labs: none  Radiology: none    Last colonoscopy: 9 yrs ago? Assessment/Plan:     A/P:  New problem(s): Anal fissure  Established problem(s): none  Additional workup/treatment planned: Medical therapy with prescription calcium channel blocker ointment, fiber supplementation, sitz baths, improving dietary and lifestyle choices  Risk of complications/morbidity: moderate    Patient has signs and symptoms consistent with anal fissure. Exam reveals posterior midline acute anal fissure. We will start with conservative management, including fiber supplementation, water, healthy fruits and vegetables, and medical management with prescription topical calcium channel blocker ointment. Discussed the use of the prescription ointment and that it will need to be obtained from a compounding pharmacy, which my office will arrange.   If symptoms do not improve in 6-8 weeks, patient may require more invasive treatment options, such as Botox injection, partial sphincterotomy, or fissurectomy with anocutaneous advancement flap. We briefly discussed operative risks of these various options. Patient understands the need for full anorectal exam in the future after resolution of symptoms (or colonoscopy depending on other risk factors for colon cancer). I provided written information in the After Visit Summary AVS Regarding: Anal fissure    DISPOSITION:  F/u in 6 weeks for symptom reassessment    My findings will be relayed to consulting practitioner or PCP via Epic    Note completed using dictation software, please excuse any errors.     Electronically signed by Archie Vick MD on 8/30/2022 at 9:51 AM

## 2022-08-30 NOTE — TELEPHONE ENCOUNTER
Prescription called in to [facility] [phone] for nifedipine 0.3%, lidocaine 5%. Instructed to use BID. 2 refills.

## 2022-09-06 ENCOUNTER — TELEPHONE (OUTPATIENT)
Dept: SURGERY | Age: 72
End: 2022-09-06

## 2022-09-06 NOTE — TELEPHONE ENCOUNTER
Patient called to let clinical staff know that he is having extreme pain, 10/10, when he has a BM. His stool are also very hard. Patient also cannot sleep at night due to the pain. No fever, chills, or nausea. Please call back at earliest convenience to let him what can be done to provide some relief.      Patient can be reached at [phone]

## 2022-09-06 NOTE — TELEPHONE ENCOUNTER
Talked to patient regarding fissure pain. Advised him to make sure stools are soft and soak in warm water. Take ibuprofen before bed. Call with any more issues.

## 2022-09-21 LAB — TSH SERPL DL<=0.05 MIU/L-ACNC: 3.2 UIU/ML (ref 0.27–4.2)

## 2022-09-22 ENCOUNTER — TELEPHONE (OUTPATIENT)
Dept: SURGERY | Age: 72
End: 2022-09-22

## 2022-09-22 NOTE — TELEPHONE ENCOUNTER
Patient called in stating that he is traveling and needs a refill of his ointment    Please sent the prescription to:  [facility]  fax#: [phone]    Please contact the patient with any questions at [phone]

## 2022-09-22 NOTE — TELEPHONE ENCOUNTER
Prescription called in to [facility] as requested [phone].

## 2022-10-11 ENCOUNTER — OFFICE VISIT (OUTPATIENT)
Dept: SURGERY | Age: 72
End: 2022-10-11
Payer: MEDICARE

## 2022-10-11 VITALS
OXYGEN SATURATION: 100 % | BODY MASS INDEX: 27.77 KG/M2 | TEMPERATURE: 97.1 F | SYSTOLIC BLOOD PRESSURE: 141 MMHG | HEART RATE: 52 BPM | WEIGHT: 194 LBS | HEIGHT: 70 IN | DIASTOLIC BLOOD PRESSURE: 78 MMHG

## 2022-10-11 DIAGNOSIS — K60.2 ANAL FISSURE: Primary | ICD-10-CM

## 2022-10-11 DIAGNOSIS — K59.00 CONSTIPATION, UNSPECIFIED CONSTIPATION TYPE: ICD-10-CM

## 2022-10-11 PROCEDURE — G8417 CALC BMI ABV UP PARAM F/U: HCPCS | Performed by: SURGERY

## 2022-10-11 PROCEDURE — G8427 DOCREV CUR MEDS BY ELIG CLIN: HCPCS | Performed by: SURGERY

## 2022-10-11 PROCEDURE — 99214 OFFICE O/P EST MOD 30 MIN: CPT | Performed by: SURGERY

## 2022-10-11 PROCEDURE — 1123F ACP DISCUSS/DSCN MKR DOCD: CPT | Performed by: SURGERY

## 2022-10-11 PROCEDURE — 1036F TOBACCO NON-USER: CPT | Performed by: SURGERY

## 2022-10-11 PROCEDURE — 3017F COLORECTAL CA SCREEN DOC REV: CPT | Performed by: SURGERY

## 2022-10-11 PROCEDURE — G8484 FLU IMMUNIZE NO ADMIN: HCPCS | Performed by: SURGERY

## 2022-10-11 NOTE — PROGRESS NOTES
[facility]  [address]  Dept: [phone]  Dept Fax: [phone]  Loc: [phone]    Visit Date: 10/11/2022    Juan C Smith is a 67 y.o. male who presents today for: Follow-up (Anal fissure )      HPI:       Juan C Smith is a 67 y.o. male known to me for chronic anal fissure. Was quite miserable up into the last couple weeks. He has recently started Linzess for constipation, of which has greatly improved his symptoms. Now still having stinging but less pain with BMs.     Past Medical History:   Diagnosis Date    Anal fissure 08/2022    AV block, 1st degree     CAD (coronary artery disease) 04/2011    Dr. Rahul Sellers    Calculus of kidney 12/01/2010    Chondromalacia of patella     Closed nondisplaced fracture of styloid process of left radius 10/2021    DDD (degenerative disc disease), lumbar     L2-3; L4-5    DDD (degenerative disc disease), lumbar 04/2019    L1-2; L2-3, L3-4; L4-5; L5-S1    Degeneration of cervical intervertebral disc      Degenerative arthritis of left foot 07/2016    Depression and anxiety      Diabetes mellitus, type 2 ([address] 04/2019    diet controlled    ED (erectile dysfunction)      Esophageal reflux      Lumbar stenosis 02/2020    L2-3; L3-4; L4-5    Obstructive sleep apnea 2003    CPAP    Prediabetes 04/2015    Pure hypercholesterolemia      Retinal tears 1980, 1997    Spinal stenosis in cervical region      Testosterone deficiency 04/2012    Tricuspid regurgitation      Ventricular tachycardia, nonsustained      Past Surgical History:   Procedure Laterality Date    APPENDECTOMY  1958    CERVICAL FUSION  12/9/2010    ACDF C7-T1with Bilateral C8 Root Decompression, Zero & Impant w/synthesis Removal of C7 screw on Left    [facility]    right    KNEE SURGERY  1994    left    KNEE SURGERY  2001    right    PAIN MANAGEMENT PROCEDURE N/A 3/18/2020 BILATERAL L3 TRANSFORAMINAL  EPIDURAL STEROID INJECTION WITH FLUOROSCOPY performed by Hira Mehta MD at [address] Bilateral 5/13/2020    BILATERAL L4 TRANSFORAMINAL EPIDURAL STEROID INJECTION WITH FLUOROSCOPY performed by Hira Mehta MD at [address]  08/2016    PTCA  4 26 2011    with stent LAD    [address] [facility]       Current Outpatient Medications:     atorvastatin (LIPITOR) 20 MG tablet, Take 1 tablet by mouth in the morning., Disp: 90 tablet, Rfl: 1    lisinopril (PRINIVIL;ZESTRIL) 10 MG tablet, Take 1 tablet by mouth in the morning., Disp: 90 tablet, Rfl: 1    metoprolol succinate (TOPROL XL) 25 MG extended release tablet, Take 1 tablet by mouth in the morning., Disp: 90 tablet, Rfl: 1    blood glucose monitor strips, Test 1 times a day & as needed for symptoms of fluctuating blood glucose. Dispense sufficient amount ., Disp: 100 strip, Rfl: 5    Lancets MISC, Test 1 times a day & as needed for symptoms of fluctuating blood glucose. Dispense sufficient amount ., Disp: 100 each, Rfl: 5    blood glucose monitor kit and supplies, Check Blood sugars and prn symptoms of fluctuating Blood sugars . , Disp: 1 kit, Rfl: 0    aspirin 81 MG EC tablet, Take 81 mg by mouth daily, Disp: , Rfl:     famotidine (PEPCID) 20 MG tablet, Take 1 tablet by mouth 2 times daily, Disp: 180 tablet, Rfl: 1    vitamin E 1000 units capsule, Take 1,000 Units by mouth daily, Disp: , Rfl:     Probiotic Product (PROBIOTIC DAILY PO), Take 1 tablet by mouth daily, Disp: , Rfl:     sildenafil (REVATIO) 20 MG tablet, Take 1 - 5 pills po prn ED., Disp: 90 tablet, Rfl: 1    therapeutic multivitamin-minerals (THERAGRAN-M) tablet, Take 1 tablet by mouth daily. , Disp: , Rfl:     Ascorbic Acid (VITAMIN C) 1000 MG tablet, Take 1,000 mg by mouth daily. , Disp: , Rfl:     vitamin B-12 (CYANOCOBALAMIN) 1000 MCG tablet, Take 1,000 mcg by mouth daily.   , Disp: , Rfl: Magnesium 500 MG TABS, Take 1 tablet by mouth daily 750mg, Disp: , Rfl:     fish oil-omega-3 fatty acids 1000 MG capsule, Take 2 g by mouth daily. , Disp: , Rfl:     Glucosamine-Chondroitin 750-600 MG TABS, Take 1 tablet by mouth 2 times daily. , Disp: , Rfl:     calcium carbonate 600 MG TABS tablet, Take 1 tablet by mouth daily. , Disp: , Rfl:     sertraline (ZOLOFT) 50 MG tablet, TAKE 1 TABLET BY MOUTH DAILY, Disp: 90 tablet, Rfl: 1  Allergies   Allergen Reactions    Amoxicillin Hives and Swelling     Past Surgical History:   Procedure Laterality Date    [address]  12/9/2010    ACDF C7-T1with Bilateral C8 Root Decompression, Zero & Impant w/synthesis Removal of C7 screw on Left    [address]    right    KNEE SURGERY  1994    left    KNEE SURGERY  2001    right    PAIN MANAGEMENT PROCEDURE N/A 3/18/2020    BILATERAL L3 TRANSFORAMINAL  EPIDURAL STEROID INJECTION WITH FLUOROSCOPY performed by Andrew Swartz MD at [address] [facility] Bilateral 5/13/2020    BILATERAL L4 TRANSFORAMINAL EPIDURAL STEROID INJECTION WITH FLUOROSCOPY performed by Andrew Swartz MD at [address] [facility]  08/2016    PTCA  4 26 2011    with stent LAD    [address]     Family History   Problem Relation Age of Onset    Heart Disease Mother     Heart Disease Father         CAD    Diabetes Father     High Blood Pressure Father     Hypertension Father     Dementia Brother 68       Social History:   Social History     Tobacco Use    Smoking status: Never    Smokeless tobacco: Never   Substance Use Topics    Alcohol use: Yes     Alcohol/week: 3.0 - 6.0 standard drinks     Types: 3 - 6 Cans of beer per week     Comment: weekly      Tobacco cessation counseling provided as appropriate. REVIEW OF SYSTEMS:    Pertinent positives and negatives are mentioned in the HPI. Otherwise, all other systems were reviewed and negative.     Objective:     Physical Exam   BP (!) 141/78   Pulse 52   Temp 97.1 °F (36.2 °C) (Infrared)   Ht 5' 9.5\" (1.765 m)   Wt 194 lb (88 kg)   SpO2 100%   BMI 28.24 kg/m²   Constitutional: Appears well-developed and well-nourished. Grooming appropriate. No gross deformities. Body mass index is 28.24 kg/m². Eyes: No scleral icterus. Conjunctiva/lids normal. Vision intact grossly. Pupils equal/symmetric, reactive bilaterally. ENT: External ears/nose without defect, scars, or masses. Hearing grossly intact. No facial deformity. Lips normal, normal dentition. Neck: No masses. Trachea midline. No crepitus. Thyroid not enlarged. Cardiovascular: Normal rate. No peripheral edema. Abdominal aorta normal size to palpation. Pulmonary/Chest: Effort normal. No respiratory distress. No wheezes. No use of accessory muscles. Musculoskeletal: Normal range of motion of head/neck, without deformity, pain, or crepitus, with normal strength and tone. Normal gait. Nails without clubbing or cyanosis. Neurological: Alert and oriented to person, place, and time. No gross deficits. Sensation intact. Skin: Skin is dry. No rashes noted. No pallor. No induration of nodules. Psychiatric: Normal mood and affect. Behavior normal. Oriented to person, place, and time. Judgment and insight reasonable. Abdominal/wound: Soft, nontender, nondistended    Anorectal Exam: Chaperone present in room throughout exam.  Patient placed in the left lateral position. Buttocks spread.    Findings reveal: Posterior midline chronic anal fissure with small skin tag,  to cotton to palpation    Labs reviewed: None     Imaging reviewed: None    Assessment/Plan:       A/P:  Established problem(s): Chronic posterior midline anal fissure, constipation  Additional workup/treatment planned: Continue Linzess, continue calcium channel blocker prescription ointment  Risk of complications/morbidity: Moderate    Fortunately, his symptoms have been improving since starting Linzess, as well as using his calcium channel blocker prescription ointment. I encouraged him to continue doing this as he seems to be on the right pathway now. I will see him back in 6 weeks to reassess. At this point seems less likely that he will require surgery. Continue with current prescription medications    DISPOSITION:  f/u 6 wks    My findings will be relayed to consulting practitioner or PCP via Epic note    Note completed using dictation software, please excuse any errors.     Electronically signed by Ilya Back MD on 10/11/2022 at 9:14 AM

## 2022-10-28 DIAGNOSIS — E11.9 TYPE 2 DIABETES MELLITUS WITHOUT COMPLICATION, WITHOUT LONG-TERM CURRENT USE OF INSULIN (HCC): ICD-10-CM

## 2022-10-28 DIAGNOSIS — I10 ESSENTIAL HYPERTENSION: ICD-10-CM

## 2022-10-28 DIAGNOSIS — E78.00 PURE HYPERCHOLESTEROLEMIA: ICD-10-CM

## 2022-10-28 DIAGNOSIS — E34.9 TESTOSTERONE DEFICIENCY: ICD-10-CM

## 2022-10-29 LAB
A/G RATIO: 2.1 (ref 1.1–2.2)
ALBUMIN SERPL-MCNC: 4.9 G/DL (ref 3.4–5)
ALP BLD-CCNC: 72 U/L (ref 40–129)
ALT SERPL-CCNC: 29 U/L (ref 10–40)
ANION GAP SERPL CALCULATED.3IONS-SCNC: 12 MMOL/L (ref 3–16)
AST SERPL-CCNC: 29 U/L (ref 15–37)
BILIRUB SERPL-MCNC: 1.1 MG/DL (ref 0–1)
BUN BLDV-MCNC: 14 MG/DL (ref 7–20)
CALCIUM SERPL-MCNC: 9.9 MG/DL (ref 8.3–10.6)
CHLORIDE BLD-SCNC: 102 MMOL/L (ref 99–110)
CHOLESTEROL, TOTAL: 192 MG/DL (ref 0–199)
CO2: 26 MMOL/L (ref 21–32)
CREAT SERPL-MCNC: 0.9 MG/DL (ref 0.8–1.3)
ESTIMATED AVERAGE GLUCOSE: 137 MG/DL
GFR SERPL CREATININE-BSD FRML MDRD: >60 ML/MIN/{1.73_M2}
GLUCOSE BLD-MCNC: 115 MG/DL (ref 70–99)
HBA1C MFR BLD: 6.4 %
HDLC SERPL-MCNC: 55 MG/DL (ref 40–60)
LDL CHOLESTEROL CALCULATED: 125 MG/DL
POTASSIUM SERPL-SCNC: 4.9 MMOL/L (ref 3.5–5.1)
SODIUM BLD-SCNC: 140 MMOL/L (ref 136–145)
TOTAL PROTEIN: 7.2 G/DL (ref 6.4–8.2)
TRIGL SERPL-MCNC: 62 MG/DL (ref 0–150)
VLDLC SERPL CALC-MCNC: 12 MG/DL

## 2022-10-31 DIAGNOSIS — E78.00 PURE HYPERCHOLESTEROLEMIA: Primary | ICD-10-CM

## 2022-10-31 RX ORDER — ATORVASTATIN CALCIUM 40 MG/1
40 TABLET, FILM COATED ORAL DAILY
Qty: 90 TABLET | Refills: 1 | Status: SHIPPED | OUTPATIENT
Start: 2022-10-31

## 2022-11-02 ENCOUNTER — OFFICE VISIT (OUTPATIENT)
Dept: FAMILY MEDICINE CLINIC | Age: 72
End: 2022-11-02
Payer: MEDICARE

## 2022-11-02 VITALS
WEIGHT: 192.6 LBS | BODY MASS INDEX: 28.03 KG/M2 | HEART RATE: 61 BPM | DIASTOLIC BLOOD PRESSURE: 80 MMHG | RESPIRATION RATE: 12 BRPM | OXYGEN SATURATION: 99 % | TEMPERATURE: 97.2 F | SYSTOLIC BLOOD PRESSURE: 116 MMHG

## 2022-11-02 DIAGNOSIS — M48.061 SPINAL STENOSIS OF LUMBAR REGION, UNSPECIFIED WHETHER NEUROGENIC CLAUDICATION PRESENT: ICD-10-CM

## 2022-11-02 DIAGNOSIS — E11.9 TYPE 2 DIABETES MELLITUS WITHOUT COMPLICATION, WITHOUT LONG-TERM CURRENT USE OF INSULIN (HCC): Primary | ICD-10-CM

## 2022-11-02 DIAGNOSIS — I10 ESSENTIAL HYPERTENSION: ICD-10-CM

## 2022-11-02 DIAGNOSIS — E34.9 TESTOSTERONE DEFICIENCY: ICD-10-CM

## 2022-11-02 DIAGNOSIS — K21.9 GASTROESOPHAGEAL REFLUX DISEASE WITHOUT ESOPHAGITIS: ICD-10-CM

## 2022-11-02 DIAGNOSIS — K60.2 ANAL FISSURE: ICD-10-CM

## 2022-11-02 DIAGNOSIS — M51.36 DDD (DEGENERATIVE DISC DISEASE), LUMBAR: ICD-10-CM

## 2022-11-02 DIAGNOSIS — I25.10 CORONARY ARTERY DISEASE INVOLVING NATIVE CORONARY ARTERY OF NATIVE HEART WITHOUT ANGINA PECTORIS: ICD-10-CM

## 2022-11-02 DIAGNOSIS — F34.1 DYSTHYMIC DISORDER: ICD-10-CM

## 2022-11-02 DIAGNOSIS — E78.00 PURE HYPERCHOLESTEROLEMIA: ICD-10-CM

## 2022-11-02 LAB
SEX HORMONE BINDING GLOBULIN: 24 NMOL/L (ref 11–80)
TESTOSTERONE FREE-NONMALE: 75.9 PG/ML (ref 47–244)
TESTOSTERONE TOTAL: 322 NG/DL (ref 220–1000)

## 2022-11-02 PROCEDURE — 3017F COLORECTAL CA SCREEN DOC REV: CPT | Performed by: FAMILY MEDICINE

## 2022-11-02 PROCEDURE — G8484 FLU IMMUNIZE NO ADMIN: HCPCS | Performed by: FAMILY MEDICINE

## 2022-11-02 PROCEDURE — 3044F HG A1C LEVEL LT 7.0%: CPT | Performed by: FAMILY MEDICINE

## 2022-11-02 PROCEDURE — G8417 CALC BMI ABV UP PARAM F/U: HCPCS | Performed by: FAMILY MEDICINE

## 2022-11-02 PROCEDURE — 1036F TOBACCO NON-USER: CPT | Performed by: FAMILY MEDICINE

## 2022-11-02 PROCEDURE — 3074F SYST BP LT 130 MM HG: CPT | Performed by: FAMILY MEDICINE

## 2022-11-02 PROCEDURE — G8427 DOCREV CUR MEDS BY ELIG CLIN: HCPCS | Performed by: FAMILY MEDICINE

## 2022-11-02 PROCEDURE — 1123F ACP DISCUSS/DSCN MKR DOCD: CPT | Performed by: FAMILY MEDICINE

## 2022-11-02 PROCEDURE — 99214 OFFICE O/P EST MOD 30 MIN: CPT | Performed by: FAMILY MEDICINE

## 2022-11-02 PROCEDURE — 2022F DILAT RTA XM EVC RTNOPTHY: CPT | Performed by: FAMILY MEDICINE

## 2022-11-02 PROCEDURE — 3078F DIAST BP <80 MM HG: CPT | Performed by: FAMILY MEDICINE

## 2022-11-02 NOTE — PROGRESS NOTES
SUBJECTIVE:  67 y.o.. male  for follow up of diabetes, hypertension, and hypercholesterolemia. Diabetic Review of Systems - medication compliance: compliant most of the time, diabetic diet compliance: noncompliant some of the time, home glucose monitoring: fasting values range 120-130s in am 199 before lunch today . Usually 90-130s, further diabetic ROS: no polyuria or polydipsia, no chest pain, dyspnea or TIA's, no numbness, tingling or pain in extremities, last eye exam approximately 2/21. Current Outpatient Medications   Medication Sig Dispense Refill    atorvastatin (LIPITOR) 40 MG tablet Take 1 tablet by mouth daily 90 tablet 1    lisinopril (PRINIVIL;ZESTRIL) 10 MG tablet Take 1 tablet by mouth in the morning. 90 tablet 1    metoprolol succinate (TOPROL XL) 25 MG extended release tablet Take 1 tablet by mouth in the morning. 90 tablet 1    blood glucose monitor strips Test 1 times a day & as needed for symptoms of fluctuating blood glucose. Dispense sufficient amount . 100 strip 5    Lancets MISC Test 1 times a day & as needed for symptoms of fluctuating blood glucose. Dispense sufficient amount . 100 each 5    blood glucose monitor kit and supplies Check Blood sugars and prn symptoms of fluctuating Blood sugars . 1 kit 0    aspirin 81 MG EC tablet Take 81 mg by mouth daily      vitamin E 1000 units capsule Take 1,000 Units by mouth daily      Probiotic Product (PROBIOTIC DAILY PO) Take 1 tablet by mouth daily      sildenafil (REVATIO) 20 MG tablet Take 1 - 5 pills po prn ED. 90 tablet 1    therapeutic multivitamin-minerals (THERAGRAN-M) tablet Take 1 tablet by mouth daily. Ascorbic Acid (VITAMIN C) 1000 MG tablet Take 1,000 mg by mouth daily. vitamin B-12 (CYANOCOBALAMIN) 1000 MCG tablet Take 1,000 mcg by mouth daily. Magnesium 500 MG TABS Take 1 tablet by mouth daily 750mg      fish oil-omega-3 fatty acids 1000 MG capsule Take 2 g by mouth daily.         Glucosamine-Chondroitin 750-600 MG TABS Take 1 tablet by mouth 2 times daily. calcium carbonate 600 MG TABS tablet Take 1 tablet by mouth daily. atorvastatin (LIPITOR) 20 MG tablet Take 1 tablet by mouth in the morning. (Patient not taking: Reported on 11/2/2022) 90 tablet 1    sertraline (ZOLOFT) 50 MG tablet TAKE 1 TABLET BY MOUTH DAILY 90 tablet 1     No current facility-administered medications for this visit. ROS: All other systems were reviewed and were negative . Patient's allergies and medications were reviewed. Patient's past medical, surgical, social , and family history were reviewed. OBJECTIVE:  /80   Pulse 61   Temp 97.2 °F (36.2 °C) (Temporal)   Resp 12   Wt 192 lb 9.6 oz (87.4 kg)   SpO2 99%   BMI 28.03 kg/m²   General: NAD, cooperative, alert and oriented X 3, affect / mood is good. Good insight. well hydrated. Neck : no lymphadenopathy, supple, FROM  CV: Regular rate and rhythm , no murmurs/ rub/ gallop. No edema. Lungs : CTA bilaterally, breathing comfortably  Abdomen: positive bowel sounds, soft , non tender, non distended. No hepatosplenomegaly. Feet: normal sensation to monofilament bilaterally, no ulcers. DP/ PT pulses normal.   Skin: no rashes. Non tender. ASSESSMENT:  1. Type 2 diabetes mellitus without complication, without long-term current use of insulin ([facility])  - HgbA1c= 6.4 in 10/28/22.   - Controlled. Continue dietary/ lifestyle modifications, including decreased concentrated sweets and carbohydrates. -  DIABETES FOOT EXAM  - Hemoglobin A1C; Future  - Comprehensive Metabolic Panel; Future    2. Essential hypertension  - Controlled. Continue Lisinopril 10 mg qd, Metoprolol XL 25 mg qd and low sodium diet. - Comprehensive Metabolic Panel; Future    3. Pure hypercholesterolemia  - Controlled continue Lipitor 40 mg qd. - Comprehensive Metabolic Panel; Future  - Lipid Panel; Future    4. Testosterone deficiency  - Stable.      5. Gastroesophageal reflux disease without esophagitis  - Stable. Continue dietary/ lifestyle modifications. 6. Dysthymic disorder  - Stable off meds. 7. Coronary artery disease involving native coronary artery of native heart without angina pectoris  - Stable. Continue Aspirin qd, Lipitor qd and Metoprolol XL 25 mg qd. - Comprehensive Metabolic Panel; Future    8. Spinal stenosis of lumbar region, unspecified whether neurogenic claudication present/ 9. DDD (degenerative disc disease), lumbar  - Stable. Moist heat . ROM exercises. Modify activities. 10. Anal fissure  - Continue probiotic qd . Push fluids -water and daily dietary fiber.   - linaCLOtide (LINZESS) 72 MCG CAPS capsule; Take 1 capsule by mouth every morning (before breakfast)  Dispense: 90 capsule; Refill: 1    PLAN:  See orders for this visit as documented in the electronic medical record. Issues reviewed with her: low cholesterol diet, weight control and daily exercise discussed, home glucose monitoring emphasized, all medications, side effects and compliance discussed carefully, foot care discussed and Podiatry visits discussed, annual eye examinations at Ophthalmology discussed, glycohemoglobin and other lab monitoring discussed, and long term diabetic complications discussed. Goals:   1) Blood pressure < 130/80  2) HGA1C ideal less than 6.5, at least less than 7.0  3) LDL cholesterol < 100  4) Exercise 150 minutes a week   5) Dilated eye exam by an optometrist or ophthalmologist once a year  6) Fasting blood sugar < 110  7) Glucose 2 hours after meal < 140  8) Aspirin 81 mg once a day  9) BMI (Body Mass Index) ideal < 25, at least less than 30. Weight loss of even 10 to 15 pounds is effective in improving diabetes  9) Total fat intake to less than 60 grams per day and saturated fat to less than 20 grams per day. 10) Diabetic education and diabetic nutrition classes. [phone]) Doctor visits every 3 months. Follow up 3 months/ prn.

## 2022-11-22 ENCOUNTER — OFFICE VISIT (OUTPATIENT)
Dept: SURGERY | Age: 72
End: 2022-11-22
Payer: MEDICARE

## 2022-11-22 VITALS
TEMPERATURE: 97.4 F | WEIGHT: 197 LBS | BODY MASS INDEX: 28.2 KG/M2 | HEART RATE: 57 BPM | OXYGEN SATURATION: 97 % | HEIGHT: 70 IN | DIASTOLIC BLOOD PRESSURE: 76 MMHG | SYSTOLIC BLOOD PRESSURE: 139 MMHG

## 2022-11-22 DIAGNOSIS — K60.2 ANAL FISSURE: Primary | ICD-10-CM

## 2022-11-22 PROCEDURE — 3078F DIAST BP <80 MM HG: CPT | Performed by: SURGERY

## 2022-11-22 PROCEDURE — G8484 FLU IMMUNIZE NO ADMIN: HCPCS | Performed by: SURGERY

## 2022-11-22 PROCEDURE — 99213 OFFICE O/P EST LOW 20 MIN: CPT | Performed by: SURGERY

## 2022-11-22 PROCEDURE — G8427 DOCREV CUR MEDS BY ELIG CLIN: HCPCS | Performed by: SURGERY

## 2022-11-22 PROCEDURE — G8417 CALC BMI ABV UP PARAM F/U: HCPCS | Performed by: SURGERY

## 2022-11-22 PROCEDURE — 1036F TOBACCO NON-USER: CPT | Performed by: SURGERY

## 2022-11-22 PROCEDURE — 1123F ACP DISCUSS/DSCN MKR DOCD: CPT | Performed by: SURGERY

## 2022-11-22 PROCEDURE — 3017F COLORECTAL CA SCREEN DOC REV: CPT | Performed by: SURGERY

## 2022-11-22 PROCEDURE — 3074F SYST BP LT 130 MM HG: CPT | Performed by: SURGERY

## 2022-11-22 NOTE — PROGRESS NOTES
[facility]  [address]  Dept: [phone]  Dept Fax: [phone]  Loc: [phone]    Visit Date: 11/22/2022    Jeremy Toussaint is a 67 y.o. male who presents today for: Follow-up (Anal fissure )      HPI:       Jeremy Toussaint is a 67 y.o. male known to me for chronic anal fissure. Overall states he is doing much better. He has been using his ointment. Still some discomfort, but no sharp pain. No bleeding.     Past Medical History:   Diagnosis Date    Anal fissure 08/2022    AV block, 1st degree     CAD (coronary artery disease) 04/2011    Dr. Herm[place] Keto    Calculus of kidney 12/01/2010    Chondromalacia of patella     Closed nondisplaced fracture of styloid process of left radius 10/2021    DDD (degenerative disc disease), lumbar     L2-3; L4-5    DDD (degenerative disc disease), lumbar 04/2019    L1-2; L2-3, L3-4; L4-5; L5-S1    Degeneration of cervical intervertebral disc      Degenerative arthritis of left foot 07/2016    Depression and anxiety      Diabetes mellitus, type 2 ([address] 04/2019    diet controlled    ED (erectile dysfunction)      Esophageal reflux      Lumbar stenosis 02/2020    L2-3; L3-4; L4-5    Obstructive sleep apnea 2003    CPAP    Prediabetes 04/2015    Pure hypercholesterolemia      Retinal tears 1980, 1997    Spinal stenosis in cervical region      Testosterone deficiency 04/2012    Tricuspid regurgitation      Ventricular tachycardia, nonsustained      Past Surgical History:   Procedure Laterality Date    APPENDECTOMY  1958    CERVICAL FUSION  12/9/2010    ACDF C7-T1with Bilateral C8 Root Decompression, Zero & Impant w/synthesis Removal of C7 screw on Left    [address]    right    KNEE SURGERY  1994    left    KNEE SURGERY  2001    right    PAIN MANAGEMENT PROCEDURE N/A 3/18/2020    BILATERAL L3 TRANSFORAMINAL  EPIDURAL STEROID INJECTION WITH FLUOROSCOPY performed by Maicol Wu MD at [address] Bilateral 5/13/2020    BILATERAL L4 TRANSFORAMINAL EPIDURAL STEROID INJECTION WITH FLUOROSCOPY performed by Maicol Wu MD at [address] [facility]  08/2016    PTCA  4 26 2011    with stent LAD    [address]       Current Outpatient Medications:     atorvastatin (LIPITOR) 40 MG tablet, Take 1 tablet by mouth daily, Disp: 90 tablet, Rfl: 1    lisinopril (PRINIVIL;ZESTRIL) 10 MG tablet, Take 1 tablet by mouth in the morning., Disp: 90 tablet, Rfl: 1    metoprolol succinate (TOPROL XL) 25 MG extended release tablet, Take 1 tablet by mouth in the morning., Disp: 90 tablet, Rfl: 1    blood glucose monitor strips, Test 1 times a day & as needed for symptoms of fluctuating blood glucose. Dispense sufficient amount ., Disp: 100 strip, Rfl: 5    Lancets MISC, Test 1 times a day & as needed for symptoms of fluctuating blood glucose. Dispense sufficient amount ., Disp: 100 each, Rfl: 5    blood glucose monitor kit and supplies, Check Blood sugars and prn symptoms of fluctuating Blood sugars . , Disp: 1 kit, Rfl: 0    aspirin 81 MG EC tablet, Take 81 mg by mouth daily, Disp: , Rfl:     vitamin E 1000 units capsule, Take 1,000 Units by mouth daily, Disp: , Rfl:     Probiotic Product (PROBIOTIC DAILY PO), Take 1 tablet by mouth daily, Disp: , Rfl:     sildenafil (REVATIO) 20 MG tablet, Take 1 - 5 pills po prn ED., Disp: 90 tablet, Rfl: 1    therapeutic multivitamin-minerals (THERAGRAN-M) tablet, Take 1 tablet by mouth daily. , Disp: , Rfl:     Ascorbic Acid (VITAMIN C) 1000 MG tablet, Take 1,000 mg by mouth daily. , Disp: , Rfl:     vitamin B-12 (CYANOCOBALAMIN) 1000 MCG tablet, Take 1,000 mcg by mouth daily. , Disp: , Rfl:     Magnesium 500 MG TABS, Take 1 tablet by mouth daily 750mg, Disp: , Rfl:     fish oil-omega-3 fatty acids 1000 MG capsule, Take 2 g by mouth daily.   , Disp: , Rfl: Glucosamine-Chondroitin 750-600 MG TABS, Take 1 tablet by mouth 2 times daily. , Disp: , Rfl:     calcium carbonate 600 MG TABS tablet, Take 1 tablet by mouth daily. , Disp: , Rfl:     linaCLOtide (LINZESS) 72 MCG CAPS capsule, Take 1 capsule by mouth every morning (before breakfast) (Patient not taking: Reported on 11/22/2022), Disp: 90 capsule, Rfl: 1    sertraline (ZOLOFT) 50 MG tablet, TAKE 1 TABLET BY MOUTH DAILY, Disp: 90 tablet, Rfl: 1  Allergies   Allergen Reactions    Amoxicillin Hives and Swelling     Past Surgical History:   Procedure Laterality Date    [address]  12/9/2010    ACDF C7-T1with Bilateral C8 Root Decompression, Zero & Impant w/synthesis Removal of C7 screw on Left    [address] [facility]    right    [facility]    left    KNEE SURGERY  2001    right    PAIN MANAGEMENT PROCEDURE N/A 3/18/2020    BILATERAL L3 TRANSFORAMINAL  EPIDURAL STEROID INJECTION WITH FLUOROSCOPY performed by Mercedes Bautista MD at [address] [facility] Bilateral 5/13/2020    BILATERAL L4 TRANSFORAMINAL EPIDURAL STEROID INJECTION WITH FLUOROSCOPY performed by Mercedes Bautista MD at [address] [facility]  08/2016    PTCA  4 26 2011    with stent LAD    [address] [facility]     Family History   Problem Relation Age of Onset    Heart Disease Mother     Heart Disease Father         CAD    Diabetes Father     High Blood Pressure Father     Hypertension Father     Dementia Brother 68       Social History:   Social History     Tobacco Use    Smoking status: Never    Smokeless tobacco: Never   Substance Use Topics    Alcohol use: Yes     Alcohol/week: 3.0 - 6.0 standard drinks     Types: 3 - 6 Cans of beer per week     Comment: weekly      Tobacco cessation counseling provided as appropriate. REVIEW OF SYSTEMS:    Pertinent positives and negatives are mentioned in the HPI. Otherwise, all other systems were reviewed and negative.     Objective: Physical Exam   /76   Pulse 57   Temp 97.4 °F (36.3 °C) (Infrared)   Ht 5' 9.5\" (1.765 m)   Wt 197 lb (89.4 kg)   SpO2 97%   BMI 28.67 kg/m²   Constitutional: Appears well-developed and well-nourished. Grooming appropriate. No gross deformities. Body mass index is 28.67 kg/m². Eyes: No scleral icterus. Conjunctiva/lids normal. Vision intact grossly. Pupils equal/symmetric, reactive bilaterally. ENT: External ears/nose without defect, scars, or masses. Hearing grossly intact. No facial deformity. Lips normal, normal dentition. Neck: No masses. Trachea midline. No crepitus. Thyroid not enlarged. Cardiovascular: Normal rate. No peripheral edema. Abdominal aorta normal size to palpation. Pulmonary/Chest: Effort normal. No respiratory distress. No wheezes. No use of accessory muscles. Musculoskeletal: Normal range of motion of head/neck, without deformity, pain, or crepitus, with normal strength and tone. Normal gait. Nails without clubbing or cyanosis. Neurological: Alert and oriented to person, place, and time. No gross deficits. Sensation intact. Skin: Skin is dry. No rashes noted. No pallor. No induration of nodules. Psychiatric: Normal mood and affect. Behavior normal. Oriented to person, place, and time. Judgment and insight reasonable. Abdominal/wound: Soft, nontender, nondistended    Anorectal Exam: Chaperone present in room throughout exam.  Patient placed in the left lateral position. Buttocks spread. Digital rectal exam performed with lubricated finger. Anoscopy performed. Findings reveal: Chronic posterior midline anal fissure, nontender.   Anoscopy otherwise normal.    Labs reviewed: None     Imaging reviewed: None    Assessment/Plan:       A/P:  Established problem(s): Chronic anal fissure, improving  Additional workup/treatment planned: Continue calcium channel blocker prescription ointment, stool softeners  Risk of complications/morbidity: Moderate    Fortunately, Serjio anal fissure seems to be improving. I encouraged him to continue with calcium channel blocker ointment for now and start weaning as his symptoms improve. Unlikely to require surgical intervention. Continue with current prescription medications    DISPOSITION:  f/u PRN    My findings will be relayed to consulting practitioner or PCP via Epic note    Note completed using dictation software, please excuse any errors.     Electronically signed by Mague Morales MD on 11/22/2022 at 9:18 AM

## 2022-12-05 DIAGNOSIS — I10 ESSENTIAL HYPERTENSION: ICD-10-CM

## 2022-12-05 RX ORDER — METOPROLOL SUCCINATE 25 MG/1
25 TABLET, EXTENDED RELEASE ORAL DAILY
Qty: 90 TABLET | Refills: 1 | Status: SHIPPED | OUTPATIENT
Start: 2022-12-05

## 2022-12-24 NOTE — TELEPHONE ENCOUNTER
Pt doesn't have questions regarding medication pt wanted to schedule VV with PCP for tomorrow    Pt scheduled Spontaneous, unlabored and symmetrical

## 2023-01-29 DIAGNOSIS — I10 ESSENTIAL HYPERTENSION: ICD-10-CM

## 2023-01-30 RX ORDER — LISINOPRIL 10 MG/1
10 TABLET ORAL DAILY
Qty: 90 TABLET | Refills: 1 | Status: SHIPPED | OUTPATIENT
Start: 2023-01-30

## 2023-01-30 NOTE — TELEPHONE ENCOUNTER
Requested Prescriptions     Pending Prescriptions Disp Refills    lisinopril (PRINIVIL;ZESTRIL) 10 MG tablet [Pharmacy Med Name: LISINOPRIL 10MG TABLETS] 90 tablet 1     Sig: TAKE 1 TABLET BY MOUTH IN THE MORNING     Last ov 11/2/22  Last lab 10/28/22  Next ov 2/8/23

## 2023-02-28 DIAGNOSIS — E78.00 PURE HYPERCHOLESTEROLEMIA: ICD-10-CM

## 2023-03-01 RX ORDER — ATORVASTATIN CALCIUM 40 MG/1
40 TABLET, FILM COATED ORAL DAILY
Qty: 90 TABLET | Refills: 1 | Status: SHIPPED | OUTPATIENT
Start: 2023-03-01

## 2023-03-01 NOTE — TELEPHONE ENCOUNTER
Requested Prescriptions     Pending Prescriptions Disp Refills    atorvastatin (LIPITOR) 40 MG tablet 90 tablet 1     Sig: Take 1 tablet by mouth daily     Last OV-11/2/2022  Labs- 10/28/22  NFOV

## 2023-03-23 ENCOUNTER — OFFICE VISIT (OUTPATIENT)
Dept: FAMILY MEDICINE CLINIC | Age: 73
End: 2023-03-23

## 2023-03-23 VITALS
HEART RATE: 51 BPM | SYSTOLIC BLOOD PRESSURE: 126 MMHG | WEIGHT: 201.6 LBS | BODY MASS INDEX: 29.34 KG/M2 | OXYGEN SATURATION: 96 % | DIASTOLIC BLOOD PRESSURE: 77 MMHG | TEMPERATURE: 96.8 F

## 2023-03-23 DIAGNOSIS — K08.89 TOOTH PAIN: ICD-10-CM

## 2023-03-23 DIAGNOSIS — M26.609 TMJ (TEMPOROMANDIBULAR JOINT DISORDER): ICD-10-CM

## 2023-03-23 DIAGNOSIS — E78.00 PURE HYPERCHOLESTEROLEMIA: ICD-10-CM

## 2023-03-23 DIAGNOSIS — J01.00 ACUTE MAXILLARY SINUSITIS, RECURRENCE NOT SPECIFIED: Primary | ICD-10-CM

## 2023-03-23 DIAGNOSIS — K59.00 CONSTIPATION, UNSPECIFIED CONSTIPATION TYPE: ICD-10-CM

## 2023-03-23 DIAGNOSIS — M50.30 DEGENERATION OF CERVICAL INTERVERTEBRAL DISC: ICD-10-CM

## 2023-03-23 DIAGNOSIS — I10 ESSENTIAL HYPERTENSION: ICD-10-CM

## 2023-03-23 RX ORDER — CYCLOBENZAPRINE HCL 10 MG
10 TABLET ORAL 3 TIMES DAILY PRN
Qty: 21 TABLET | Refills: 0 | Status: SHIPPED | OUTPATIENT
Start: 2023-03-23 | End: 2023-04-02

## 2023-03-23 RX ORDER — CLINDAMYCIN HYDROCHLORIDE 300 MG/1
300 CAPSULE ORAL 2 TIMES DAILY
Qty: 20 CAPSULE | Refills: 0 | Status: SHIPPED | OUTPATIENT
Start: 2023-03-23 | End: 2023-04-02

## 2023-03-23 SDOH — ECONOMIC STABILITY: INCOME INSECURITY: HOW HARD IS IT FOR YOU TO PAY FOR THE VERY BASICS LIKE FOOD, HOUSING, MEDICAL CARE, AND HEATING?: NOT VERY HARD

## 2023-03-23 SDOH — ECONOMIC STABILITY: FOOD INSECURITY: WITHIN THE PAST 12 MONTHS, THE FOOD YOU BOUGHT JUST DIDN'T LAST AND YOU DIDN'T HAVE MONEY TO GET MORE.: NEVER TRUE

## 2023-03-23 SDOH — ECONOMIC STABILITY: FOOD INSECURITY: WITHIN THE PAST 12 MONTHS, YOU WORRIED THAT YOUR FOOD WOULD RUN OUT BEFORE YOU GOT MONEY TO BUY MORE.: NEVER TRUE

## 2023-03-23 SDOH — ECONOMIC STABILITY: HOUSING INSECURITY
IN THE LAST 12 MONTHS, WAS THERE A TIME WHEN YOU DID NOT HAVE A STEADY PLACE TO SLEEP OR SLEPT IN A SHELTER (INCLUDING NOW)?: NO

## 2023-03-23 ASSESSMENT — PATIENT HEALTH QUESTIONNAIRE - PHQ9
3. TROUBLE FALLING OR STAYING ASLEEP: 0
6. FEELING BAD ABOUT YOURSELF - OR THAT YOU ARE A FAILURE OR HAVE LET YOURSELF OR YOUR FAMILY DOWN: 0
7. TROUBLE CONCENTRATING ON THINGS, SUCH AS READING THE NEWSPAPER OR WATCHING TELEVISION: 0
10. IF YOU CHECKED OFF ANY PROBLEMS, HOW DIFFICULT HAVE THESE PROBLEMS MADE IT FOR YOU TO DO YOUR WORK, TAKE CARE OF THINGS AT HOME, OR GET ALONG WITH OTHER PEOPLE: 0
1. LITTLE INTEREST OR PLEASURE IN DOING THINGS: 0
SUM OF ALL RESPONSES TO PHQ QUESTIONS 1-9: 0
9. THOUGHTS THAT YOU WOULD BE BETTER OFF DEAD, OR OF HURTING YOURSELF: 0
4. FEELING TIRED OR HAVING LITTLE ENERGY: 0
8. MOVING OR SPEAKING SO SLOWLY THAT OTHER PEOPLE COULD HAVE NOTICED. OR THE OPPOSITE, BEING SO FIGETY OR RESTLESS THAT YOU HAVE BEEN MOVING AROUND A LOT MORE THAN USUAL: 0
SUM OF ALL RESPONSES TO PHQ QUESTIONS 1-9: 0
SUM OF ALL RESPONSES TO PHQ QUESTIONS 1-9: 0
5. POOR APPETITE OR OVEREATING: 0
SUM OF ALL RESPONSES TO PHQ QUESTIONS 1-9: 0
SUM OF ALL RESPONSES TO PHQ9 QUESTIONS 1 & 2: 0
2. FEELING DOWN, DEPRESSED OR HOPELESS: 0

## 2023-03-23 NOTE — PROGRESS NOTES
Patient is here for left sided jaw pain. He had dental work either 2/12/23 or 3/1/23. Initially he had cap placed to left lower tooth . He had some issues with it and went back on 3/1/23 to have it filed down. It did not seem to make much of a difference. Cold sensitivity to bottom tooth still. He tried a toothpaste for sensitive gums and teeth . He broke left lower tooth 4 years ago. He has been having problems with same tooth. Nasal congestion and post nasal drip X 2-3 weeks. No fever or chills. Nasal discharge is clear. H/o sinus infection . Occasional popping to ears bilateral.   No sore throat . Slight cough - usually dry . Sleeping okay . Using CPAP. Minimal headaches - left sided. No shortness of breath or wheezing. Some stress with taking care of his brother in [place] and daughter getting  with increasing costs and stock market issues. H/o anal fissures and was on Linzess but got to be too expensive. Anal fissure has resolved. Stools are pasty/ skyler like . No black stools or rectal bleeding. Review of Systems    ROS: All other systems were reviewed and are negative . Patient's allergies and medications were reviewed. Patient's past medical, surgical, social , and family history were reviewed. OBJECTIVE:  /77   Pulse 51   Temp 96.8 °F (36 °C)   Wt 201 lb 9.6 oz (91.4 kg)   SpO2 96%   BMI 29.34 kg/m²     Physical Exam    General: NAD, cooperative, alert and oriented X 3. Mood / affect is good. good insight. well hydrated. HEENT: PERRLA, EOMI, TMs - clear. Nasopharynx clear. Maxillary tenderness bilateral. Left lower tooth tenderness - posterior molar. Left TMJ tenderness. Neck : no lymphadenopathy, supple, FROM. Mild left trapezius tenderness. CV: Regular rate and rhythm , no murmurs/ rub/ gallop. No edema. Lungs : CTA bilaterally, breathing comfortably  Abdomen: positive bowel sounds, soft , non tender, non distended. No hepatosplenomegaly.

## 2023-03-26 DIAGNOSIS — I10 ESSENTIAL HYPERTENSION: ICD-10-CM

## 2023-03-27 RX ORDER — LISINOPRIL 10 MG/1
10 TABLET ORAL DAILY
Qty: 90 TABLET | Refills: 1 | OUTPATIENT
Start: 2023-03-27

## 2023-04-10 ENCOUNTER — TELEPHONE (OUTPATIENT)
Dept: ORTHOPEDIC SURGERY | Age: 73
End: 2023-04-10

## 2023-04-10 DIAGNOSIS — M26.609 TMJ (TEMPOROMANDIBULAR JOINT DISORDER): ICD-10-CM

## 2023-04-10 RX ORDER — CYCLOBENZAPRINE HCL 10 MG
10 TABLET ORAL 3 TIMES DAILY PRN
Qty: 21 TABLET | Refills: 0 | Status: SHIPPED | OUTPATIENT
Start: 2023-04-10 | End: 2023-04-20

## 2023-04-11 ENCOUNTER — TELEPHONE (OUTPATIENT)
Dept: ADMINISTRATIVE | Age: 73
End: 2023-04-11

## 2023-04-25 ENCOUNTER — ANESTHESIA EVENT (OUTPATIENT)
Dept: ENDOSCOPY | Age: 73
End: 2023-04-25
Payer: MEDICARE

## 2023-04-26 ENCOUNTER — ANESTHESIA (OUTPATIENT)
Dept: ENDOSCOPY | Age: 73
End: 2023-04-26
Payer: MEDICARE

## 2023-04-26 ENCOUNTER — HOSPITAL ENCOUNTER (OUTPATIENT)
Age: 73
Setting detail: OUTPATIENT SURGERY
Discharge: HOME | End: 2023-04-26
Attending: INTERNAL MEDICINE | Admitting: INTERNAL MEDICINE
Payer: MEDICARE

## 2023-04-26 VITALS
WEIGHT: 197 LBS | SYSTOLIC BLOOD PRESSURE: 123 MMHG | DIASTOLIC BLOOD PRESSURE: 85 MMHG | OXYGEN SATURATION: 98 % | RESPIRATION RATE: 16 BRPM | HEART RATE: 54 BPM | HEIGHT: 69 IN | TEMPERATURE: 98.3 F | BODY MASS INDEX: 29.18 KG/M2

## 2023-04-26 DIAGNOSIS — E11.9 TYPE 2 DIABETES MELLITUS WITHOUT COMPLICATION, WITHOUT LONG-TERM CURRENT USE OF INSULIN (HCC): ICD-10-CM

## 2023-04-26 DIAGNOSIS — E78.00 PURE HYPERCHOLESTEROLEMIA: ICD-10-CM

## 2023-04-26 DIAGNOSIS — I25.10 CORONARY ARTERY DISEASE INVOLVING NATIVE CORONARY ARTERY OF NATIVE HEART WITHOUT ANGINA PECTORIS: ICD-10-CM

## 2023-04-26 DIAGNOSIS — Z86.010 HISTORY OF COLON POLYPS: ICD-10-CM

## 2023-04-26 DIAGNOSIS — I10 ESSENTIAL HYPERTENSION: ICD-10-CM

## 2023-04-26 LAB
ALBUMIN SERPL-MCNC: 5 G/DL (ref 3.4–5)
ALBUMIN/GLOB SERPL: 2 {RATIO} (ref 1.1–2.2)
ALP SERPL-CCNC: 90 U/L (ref 40–129)
ALT SERPL-CCNC: 61 U/L (ref 10–40)
ANION GAP SERPL CALCULATED.3IONS-SCNC: 12 MMOL/L (ref 3–16)
AST SERPL-CCNC: 48 U/L (ref 15–37)
BILIRUB SERPL-MCNC: 1.5 MG/DL (ref 0–1)
BUN SERPL-MCNC: 13 MG/DL (ref 7–20)
CALCIUM SERPL-MCNC: 9.9 MG/DL (ref 8.3–10.6)
CHLORIDE SERPL-SCNC: 102 MMOL/L (ref 99–110)
CHOLEST SERPL-MCNC: 147 MG/DL (ref 0–199)
CO2 SERPL-SCNC: 28 MMOL/L (ref 21–32)
CREAT SERPL-MCNC: 0.9 MG/DL (ref 0.8–1.3)
CREAT UR-MCNC: 131 MG/DL (ref 39–259)
GFR SERPLBLD CREATININE-BSD FMLA CKD-EPI: >60 ML/MIN/{1.73_M2}
GLUCOSE BLD-MCNC: 114 MG/DL (ref 70–99)
GLUCOSE SERPL-MCNC: 112 MG/DL (ref 70–99)
HDLC SERPL-MCNC: 51 MG/DL (ref 40–60)
LDLC SERPL CALC-MCNC: 83 MG/DL
MICROALBUMIN UR DL<=1MG/L-MCNC: 1.5 MG/DL
MICROALBUMIN/CREAT UR: 11.5 MG/G (ref 0–30)
PERFORMED ON: ABNORMAL
POTASSIUM SERPL-SCNC: 4.7 MMOL/L (ref 3.5–5.1)
PROT SERPL-MCNC: 7.5 G/DL (ref 6.4–8.2)
SODIUM SERPL-SCNC: 142 MMOL/L (ref 136–145)
TRIGL SERPL-MCNC: 64 MG/DL (ref 0–150)
VLDLC SERPL CALC-MCNC: 13 MG/DL

## 2023-04-26 PROCEDURE — 2580000003 HC RX 258: Performed by: NURSE ANESTHETIST, CERTIFIED REGISTERED

## 2023-04-26 PROCEDURE — 3609010600 HC COLONOSCOPY POLYPECTOMY SNARE/COLD BIOPSY: Performed by: INTERNAL MEDICINE

## 2023-04-26 PROCEDURE — 7100000010 HC PHASE II RECOVERY - FIRST 15 MIN: Performed by: INTERNAL MEDICINE

## 2023-04-26 PROCEDURE — 3700000001 HC ADD 15 MINUTES (ANESTHESIA): Performed by: INTERNAL MEDICINE

## 2023-04-26 PROCEDURE — 3700000000 HC ANESTHESIA ATTENDED CARE: Performed by: INTERNAL MEDICINE

## 2023-04-26 PROCEDURE — 7100000011 HC PHASE II RECOVERY - ADDTL 15 MIN: Performed by: INTERNAL MEDICINE

## 2023-04-26 PROCEDURE — 2580000003 HC RX 258: Performed by: ANESTHESIOLOGY

## 2023-04-26 PROCEDURE — 6360000002 HC RX W HCPCS: Performed by: NURSE ANESTHETIST, CERTIFIED REGISTERED

## 2023-04-26 PROCEDURE — 2709999900 HC NON-CHARGEABLE SUPPLY: Performed by: INTERNAL MEDICINE

## 2023-04-26 PROCEDURE — 88305 TISSUE EXAM BY PATHOLOGIST: CPT

## 2023-04-26 PROCEDURE — 2500000003 HC RX 250 WO HCPCS: Performed by: NURSE ANESTHETIST, CERTIFIED REGISTERED

## 2023-04-26 RX ORDER — PROPOFOL 10 MG/ML
INJECTION, EMULSION INTRAVENOUS PRN
Status: DISCONTINUED | OUTPATIENT
Start: 2023-04-26 | End: 2023-04-26 | Stop reason: SDUPTHER

## 2023-04-26 RX ORDER — SODIUM CHLORIDE, SODIUM LACTATE, POTASSIUM CHLORIDE, CALCIUM CHLORIDE 600; 310; 30; 20 MG/100ML; MG/100ML; MG/100ML; MG/100ML
INJECTION, SOLUTION INTRAVENOUS CONTINUOUS
Status: DISCONTINUED | OUTPATIENT
Start: 2023-04-26 | End: 2023-04-26 | Stop reason: HOSPADM

## 2023-04-26 RX ORDER — LIDOCAINE HYDROCHLORIDE 20 MG/ML
INJECTION, SOLUTION INFILTRATION; PERINEURAL PRN
Status: DISCONTINUED | OUTPATIENT
Start: 2023-04-26 | End: 2023-04-26 | Stop reason: SDUPTHER

## 2023-04-26 RX ORDER — PROPOFOL 10 MG/ML
INJECTION, EMULSION INTRAVENOUS CONTINUOUS PRN
Status: DISCONTINUED | OUTPATIENT
Start: 2023-04-26 | End: 2023-04-26 | Stop reason: SDUPTHER

## 2023-04-26 RX ORDER — SODIUM CHLORIDE, SODIUM LACTATE, POTASSIUM CHLORIDE, CALCIUM CHLORIDE 600; 310; 30; 20 MG/100ML; MG/100ML; MG/100ML; MG/100ML
INJECTION, SOLUTION INTRAVENOUS CONTINUOUS PRN
Status: DISCONTINUED | OUTPATIENT
Start: 2023-04-26 | End: 2023-04-26 | Stop reason: SDUPTHER

## 2023-04-26 RX ADMIN — SODIUM CHLORIDE, SODIUM LACTATE, POTASSIUM CHLORIDE, AND CALCIUM CHLORIDE: .6; .31; .03; .02 INJECTION, SOLUTION INTRAVENOUS at 09:54

## 2023-04-26 RX ADMIN — PROPOFOL 150 MCG/KG/MIN: 10 INJECTION, EMULSION INTRAVENOUS at 09:58

## 2023-04-26 RX ADMIN — SODIUM CHLORIDE, POTASSIUM CHLORIDE, SODIUM LACTATE AND CALCIUM CHLORIDE: 600; 310; 30; 20 INJECTION, SOLUTION INTRAVENOUS at 09:48

## 2023-04-26 RX ADMIN — LIDOCAINE HYDROCHLORIDE 100 MG: 20 INJECTION, SOLUTION INFILTRATION; PERINEURAL at 09:58

## 2023-04-26 RX ADMIN — PROPOFOL 70 MG: 10 INJECTION, EMULSION INTRAVENOUS at 09:58

## 2023-04-26 ASSESSMENT — PAIN - FUNCTIONAL ASSESSMENT: PAIN_FUNCTIONAL_ASSESSMENT: 0-10

## 2023-04-26 NOTE — PROGRESS NOTES
Ambulatory Surgery/Procedure Discharge Note    Vitals:    04/26/23 1026   BP: 103/62   Pulse: 50   Resp: 16   Temp: 98.3 °F (36.8 °C)   SpO2: 98%     Pt meets discharge criteria per Leila score. In: 800 [I.V.:800]  Out: -     Restroom use offered before discharge. Yes    Pain assessment:  none  Pain Level: 0    Pt and S.O./family states \"ready to go home\". Pt alert and oriented x4. IV removed. Denies N/V or pain. Discharge instructions given to pt and wife with pt permission. Pt and wife verbalized understanding of all instructions. Left with all belongings, and discharge instructions. Patient discharged to home/self care.  Patient discharged via wheel chair by transporter to waiting family/S.O.       4/26/2023 10:30 AM

## 2023-04-26 NOTE — PROCEDURES
Colonoscopy Procedure  Note          Patient: Miguel Carballo  : 1950  CRN:  @[email or web address]@    Procedure: Colonoscopy with polypectomy (cold snare)    Date:  2023    Surgeon:  Valeria Wadsworth MD, MD    Referring Physician: Pradip Gonzales MD    Preoperative Diagnosis:  History of colon polyps [Z86.010]    Postoperative Diagnosis:  * No post-op diagnosis entered *    Anesthesia:  MAC    EBL: Minimal to none. Indications: This is a 67y.o. year old male who presents today with previous adenomatous polyp. Procedure: An informed consent was obtained from the patient after explanation of indications, benefits, possible risks and complications of the procedure. The patient was then taken to the endoscopy suite, placed in the left lateral decubitus position, and the above IV anesthesia was administered. Digital rectal examination was performed. With the patient in the left lateral decubitus position the endoscope was inserted through the anorectal area into the rectum. The scope was then advanced through the length of the colon to the terminal ileum. The quality of preparation was adequate. The scope was carefully withdrawn and the mucosa was fully inspected including retroflexion in the rectum. Findings and interventions are described below. The patient tolerated the procedure well and was taken to the PACU in good condition. There were no immediate complications. Impression:    Normal terminal ileum. Polyps x2, 5 to 6 mm each, in the mid ascending colon removed by cold snare. Diverticulosis-mild left-sided. Rest of the colon mucosal examination was unremarkable-without any evidence of any other polyps/masses/inflammation. Recommendations: Follow-up biopsy results in the next 7 to 10 days. Repeat colonoscopy in 5 years assuming polyps are benign lesions.     Valeria Wadsworth MD, MD   [facility]  2023      Please note that some or all of this record was generated using voice

## 2023-04-26 NOTE — PROGRESS NOTES
Pre-op and post-op expectations explained and pt verbalizes understanding. No new questions at this time.  Wife at bedside

## 2023-04-26 NOTE — H&P
Glucosamine-Chondroitin 750-600 MG TABS Take 1 tablet by mouth 2 times daily. (Patient not taking: Reported on 4/26/2023)      calcium carbonate 600 MG TABS tablet Take 1 tablet by mouth daily          Allergies:  Amoxicillin      Social History:   Social History     Tobacco Use    Smoking status: Never    Smokeless tobacco: Never   Substance Use Topics    Alcohol use: Yes     Alcohol/week: 3.0 - 6.0 standard drinks     Types: 3 - 6 Cans of beer per week     Comment: weekly     Family History:   Family History   Problem Relation Age of Onset    Heart Disease Mother     Heart Disease Father         CAD    Diabetes Father     High Blood Pressure Father     Hypertension Father     Dementia Brother 68       PHYSICAL EXAM:      BP (!) 151/87   Pulse 50   Temp 97.2 °F (36.2 °C) (Temporal)   Resp 18   Ht 5' 9\" (1.753 m)   Wt 197 lb (89.4 kg)   SpO2 100%   BMI 29.09 kg/m²  I        Heart:   within normal limits    Lungs:  CTA bilat anteriorly,  Normal effort    Abdomen:   soft, NT, ND      ASA Grade:  ASA 3 - Patient with moderate systemic disease with functional limitations    Mallampati Class: 2      ASSESSMENT AND PLAN:    1. Patient is a 67 y.o. male here for Colonoscopy. 2.  Procedure options, risks and benefits reviewed with patient. Patient expresses understanding and wishes to proceed. Jl Ballard MD,   [facility]  4/26/2023    Please note that some or all of this record was generated using voice recognition software. If there are any questions about the content of this document, please contact the author as some errors in translation may have occurred.

## 2023-04-26 NOTE — ANESTHESIA POSTPROCEDURE EVALUATION
Department of Anesthesiology  Postprocedure Note    Patient: Lenora Marmolejo  MRN: [id number]  YOB: 1950  Date of evaluation: 4/26/2023      Procedure Summary     Date: 04/26/23 Room / Location: [facility]    Anesthesia Start: 3627 Anesthesia Stop: 1024    Procedure: COLONOSCOPY POLYPECTOMY SNARE/COLD BIOPSY Diagnosis:       History of colon polyps      (History of colon polyps [Z86.010])    Surgeons: Eyal Terry MD Responsible Provider: Radha Tavera DO    Anesthesia Type: general ASA Status: 3          Anesthesia Type: No value filed.     Leila Phase I: Leila Score: 10    Leila Phase II: Leila Score: 10      Anesthesia Post Evaluation    Patient location during evaluation: PACU  Patient participation: complete - patient participated  Level of consciousness: awake and alert  Airway patency: patent  Nausea & Vomiting: no nausea and no vomiting  Cardiovascular status: blood pressure returned to baseline  Respiratory status: acceptable  Hydration status: euvolemic

## 2023-04-26 NOTE — DISCHARGE INSTRUCTIONS
Follow-up biopsy results in the next 7 to 10 days. Repeat colonoscopy in 5 years assuming polyps are benign lesions. ENDOSCOPY DISCHARGE INSTRUCTIONS:    Call the physician that did your procedure for any questions or concern:    [facility]: [phone]  DR. ROSENDO MARES      ACTIVITY:    There are potential side effects to the medications used for sedation and anesthesia during your procedure. These include:  Dizziness or light-headedness, confusion or memory loss, delayed reaction times, loss of coordination, nausea and vomiting. Because of your increased risk for injury, we ask that you observe the following precautions: For the next 24 hours,  DO NOT operate an automobile, bicycle, motorcycle, , power tools or large equipment of any kind. Do not drink alcohol, sign any legal documents or make any legal decisions for 24 hours. Do not bend your head over lower than your heart. DO sit on the side of bed/couch awhile before getting up. Plan on bedrest or quiet relaxation today. You may resume normal activities in 24 hours. DIET:    Your first meal today should be light, avoiding spicy and fatty foods. If you tolerate this first meal, then you may advance to your regular diet unless otherwise advised by your physician. NORMAL SYMPTOMS:  -Mild sore throat if youve had an EGD   -Gaseous discomfort    NOTIFY YOUR PHYSICIAN IF THESE SYMPTOMS OCCUR:  1. Fever (greater than 100)  5. Increased abdominal bloating  2. Severe pain    6. Excessive bleeding  3. Nausea and vomiting  7. Chest pain                                                                    4. Chills    8. Shortness of breath    ADDITIONAL INSTRUCTIONS:    Biopsy results: Call [address] for biopsy results in 1 week    Educational Information:          Please review these discharge instructions this evening or tomorrow for  information you may have forgotten.             We want to thank you for choosing the [facility]

## 2023-04-27 LAB
EST. AVERAGE GLUCOSE BLD GHB EST-MCNC: 142.7 MG/DL
HBA1C MFR BLD: 6.6 %

## 2023-05-01 PROBLEM — D12.6 TUBULAR ADENOMA OF COLON: Status: ACTIVE | Noted: 2023-04-01

## 2023-05-20 SDOH — HEALTH STABILITY: PHYSICAL HEALTH: ON AVERAGE, HOW MANY DAYS PER WEEK DO YOU ENGAGE IN MODERATE TO STRENUOUS EXERCISE (LIKE A BRISK WALK)?: 6 DAYS

## 2023-05-20 SDOH — HEALTH STABILITY: PHYSICAL HEALTH: ON AVERAGE, HOW MANY MINUTES DO YOU ENGAGE IN EXERCISE AT THIS LEVEL?: 90 MIN

## 2023-05-22 ENCOUNTER — OFFICE VISIT (OUTPATIENT)
Dept: FAMILY MEDICINE CLINIC | Age: 73
End: 2023-05-22

## 2023-05-22 VITALS
SYSTOLIC BLOOD PRESSURE: 96 MMHG | BODY MASS INDEX: 29.98 KG/M2 | WEIGHT: 202.4 LBS | TEMPERATURE: 97 F | HEIGHT: 69 IN | DIASTOLIC BLOOD PRESSURE: 64 MMHG | OXYGEN SATURATION: 96 % | HEART RATE: 52 BPM

## 2023-05-22 DIAGNOSIS — G89.29 CHRONIC PAIN OF RIGHT KNEE: ICD-10-CM

## 2023-05-22 DIAGNOSIS — M50.30 DEGENERATION OF CERVICAL INTERVERTEBRAL DISC: ICD-10-CM

## 2023-05-22 DIAGNOSIS — I10 ESSENTIAL HYPERTENSION: Primary | ICD-10-CM

## 2023-05-22 DIAGNOSIS — F34.1 DYSTHYMIC DISORDER: ICD-10-CM

## 2023-05-22 DIAGNOSIS — B35.1 ONYCHOMYCOSIS: ICD-10-CM

## 2023-05-22 DIAGNOSIS — M25.561 CHRONIC PAIN OF RIGHT KNEE: ICD-10-CM

## 2023-05-22 DIAGNOSIS — E11.9 DIET-CONTROLLED DIABETES MELLITUS (HCC): ICD-10-CM

## 2023-05-22 RX ORDER — FLUCONAZOLE 200 MG/1
TABLET ORAL
COMMUNITY
Start: 2023-04-06

## 2023-05-22 ASSESSMENT — PATIENT HEALTH QUESTIONNAIRE - PHQ9
SUM OF ALL RESPONSES TO PHQ QUESTIONS 1-9: 0
2. FEELING DOWN, DEPRESSED OR HOPELESS: 0
9. THOUGHTS THAT YOU WOULD BE BETTER OFF DEAD, OR OF HURTING YOURSELF: 0
10. IF YOU CHECKED OFF ANY PROBLEMS, HOW DIFFICULT HAVE THESE PROBLEMS MADE IT FOR YOU TO DO YOUR WORK, TAKE CARE OF THINGS AT HOME, OR GET ALONG WITH OTHER PEOPLE: 0
5. POOR APPETITE OR OVEREATING: 0
3. TROUBLE FALLING OR STAYING ASLEEP: 0
SUM OF ALL RESPONSES TO PHQ QUESTIONS 1-9: 0
8. MOVING OR SPEAKING SO SLOWLY THAT OTHER PEOPLE COULD HAVE NOTICED. OR THE OPPOSITE, BEING SO FIGETY OR RESTLESS THAT YOU HAVE BEEN MOVING AROUND A LOT MORE THAN USUAL: 0
4. FEELING TIRED OR HAVING LITTLE ENERGY: 0
SUM OF ALL RESPONSES TO PHQ9 QUESTIONS 1 & 2: 0
SUM OF ALL RESPONSES TO PHQ QUESTIONS 1-9: 0
6. FEELING BAD ABOUT YOURSELF - OR THAT YOU ARE A FAILURE OR HAVE LET YOURSELF OR YOUR FAMILY DOWN: 0
1. LITTLE INTEREST OR PLEASURE IN DOING THINGS: 0
7. TROUBLE CONCENTRATING ON THINGS, SUCH AS READING THE NEWSPAPER OR WATCHING TELEVISION: 0
SUM OF ALL RESPONSES TO PHQ QUESTIONS 1-9: 0

## 2023-05-23 NOTE — PROGRESS NOTES
About Running Out of Food in the Last Year: Never true    Ran Out of Food in the Last Year: Never true   Transportation Needs: Unknown    Lack of Transportation (Non-Medical): No   Physical Activity: Sufficiently Active    Days of Exercise per Week: 6 days    Minutes of Exercise per Session: 90 min   Intimate Partner Violence: Not At Risk    Fear of Current or Ex-Partner: No    Emotionally Abused: No    Physically Abused: No    Sexually Abused: No   Housing Stability: Unknown    Unstable Housing in the Last Year: No          OBJECTIVE:      Vitals:    05/22/23 1225   BP: 96/64   Pulse: 52   Temp: 97 °F (36.1 °C)   SpO2: 96%   Weight: 202 lb 6.4 oz (91.8 kg)   Height: 5' 9\" (1.753 m)       General appearance: alert, no distress, cooperative, appears stated age   Head: Normocephalic, without obvious abnormality, atraumatic  Eyes: conjunctivae/corneas clear. Pupils equal, round, reactive to light. Extraocular Movements intact. Ears: normal Tympanic Membranes and external ear canals bilaterally  Nose: Nares normal.  Mucosa normal. Throat: Lips, mucosa, and tongue normal.   Neck: supple, symmetrical, trachea midline, no adenopathy, thyroid: not enlarged, symmetric, no tenderness/mass/nodules  Back: inspection of back is normal, no tenderness noted   Lungs: no acute distress, clear to auscultation bilaterally  Heart: regular rate and rhythm, S1, S2 normal, no murmur, click, rub or gallop   Abdomen: soft, non-tender. Bowel sounds normal. No masses, no organomegaly   Extremities: extremities normal, atraumatic, no cyanosis or edema  Pulses: pedal pulses intact  Skin: Skin color, texture, turgor normal. No rashes or lesions  Neurologic: Alert and oriented X 3. No focal neurological deficits. Normal coordination and gait. Psychiatric: Patient has a normal mood and affect, behavior is normal. Judgment and thought content normal.            ASSESSMENT AND PLAN    Asif Javier was seen today for new patient.     Diagnoses and all

## 2023-09-11 ENCOUNTER — OFFICE VISIT (OUTPATIENT)
Dept: FAMILY MEDICINE CLINIC | Age: 73
End: 2023-09-11

## 2023-09-11 VITALS
HEIGHT: 69 IN | WEIGHT: 208.2 LBS | BODY MASS INDEX: 30.84 KG/M2 | TEMPERATURE: 97.3 F | HEART RATE: 53 BPM | SYSTOLIC BLOOD PRESSURE: 116 MMHG | OXYGEN SATURATION: 97 % | DIASTOLIC BLOOD PRESSURE: 84 MMHG

## 2023-09-11 DIAGNOSIS — J01.90 ACUTE SINUSITIS, RECURRENCE NOT SPECIFIED, UNSPECIFIED LOCATION: Primary | ICD-10-CM

## 2023-09-11 PROBLEM — G47.9 DISTURBANCE IN SLEEP BEHAVIOR: Status: ACTIVE | Noted: 2023-09-11

## 2023-09-11 PROBLEM — E78.2 MIXED HYPERLIPIDEMIA: Status: ACTIVE | Noted: 2023-09-11

## 2023-09-11 PROBLEM — I47.29 NSVT (NONSUSTAINED VENTRICULAR TACHYCARDIA) (HCC): Status: ACTIVE | Noted: 2023-09-11

## 2023-09-11 PROBLEM — M51.16 HERNIATION OF LUMBAR INTERVERTEBRAL DISC WITH RADICULOPATHY: Status: ACTIVE | Noted: 2021-03-05

## 2023-09-11 PROBLEM — I48.91 AFIB (HCC): Status: ACTIVE | Noted: 2021-12-17

## 2023-09-11 PROBLEM — I48.3 TYPICAL ATRIAL FLUTTER (HCC): Status: ACTIVE | Noted: 2021-12-16

## 2023-09-11 PROBLEM — F41.1 OTHER ANXIETY STATES: Status: ACTIVE | Noted: 2023-09-11

## 2023-09-11 RX ORDER — AZITHROMYCIN 250 MG/1
250 TABLET, FILM COATED ORAL SEE ADMIN INSTRUCTIONS
Qty: 6 TABLET | Refills: 0 | Status: SHIPPED | OUTPATIENT
Start: 2023-09-11 | End: 2023-09-16

## 2023-09-11 ASSESSMENT — LIFESTYLE VARIABLES
HOW OFTEN DO YOU HAVE A DRINK CONTAINING ALCOHOL: 4 OR MORE TIMES A WEEK
HOW OFTEN DURING THE LAST YEAR HAVE YOU NEEDED AN ALCOHOLIC DRINK FIRST THING IN THE MORNING TO GET YOURSELF GOING AFTER A NIGHT OF HEAVY DRINKING: 0
HOW MANY STANDARD DRINKS CONTAINING ALCOHOL DO YOU HAVE ON A TYPICAL DAY: 1 OR 2
HAVE YOU OR SOMEONE ELSE BEEN INJURED AS A RESULT OF YOUR DRINKING: 0
HOW OFTEN DURING THE LAST YEAR HAVE YOU FAILED TO DO WHAT WAS NORMALLY EXPECTED FROM YOU BECAUSE OF DRINKING: 0
HOW OFTEN DURING THE LAST YEAR HAVE YOU HAD A FEELING OF GUILT OR REMORSE AFTER DRINKING: 0
HAS A RELATIVE, FRIEND, DOCTOR, OR ANOTHER HEALTH PROFESSIONAL EXPRESSED CONCERN ABOUT YOUR DRINKING OR SUGGESTED YOU CUT DOWN: 4
HOW OFTEN DURING THE LAST YEAR HAVE YOU BEEN UNABLE TO REMEMBER WHAT HAPPENED THE NIGHT BEFORE BECAUSE YOU HAD BEEN DRINKING: 0
HOW OFTEN DURING THE LAST YEAR HAVE YOU FOUND THAT YOU WERE NOT ABLE TO STOP DRINKING ONCE YOU HAD STARTED: 0

## 2023-09-11 ASSESSMENT — PATIENT HEALTH QUESTIONNAIRE - PHQ9
SUM OF ALL RESPONSES TO PHQ9 QUESTIONS 1 & 2: 0
SUM OF ALL RESPONSES TO PHQ QUESTIONS 1-9: 0
7. TROUBLE CONCENTRATING ON THINGS, SUCH AS READING THE NEWSPAPER OR WATCHING TELEVISION: 0
4. FEELING TIRED OR HAVING LITTLE ENERGY: 0
9. THOUGHTS THAT YOU WOULD BE BETTER OFF DEAD, OR OF HURTING YOURSELF: 0
SUM OF ALL RESPONSES TO PHQ QUESTIONS 1-9: 0
10. IF YOU CHECKED OFF ANY PROBLEMS, HOW DIFFICULT HAVE THESE PROBLEMS MADE IT FOR YOU TO DO YOUR WORK, TAKE CARE OF THINGS AT HOME, OR GET ALONG WITH OTHER PEOPLE: 0
2. FEELING DOWN, DEPRESSED OR HOPELESS: 0
5. POOR APPETITE OR OVEREATING: 0
6. FEELING BAD ABOUT YOURSELF - OR THAT YOU ARE A FAILURE OR HAVE LET YOURSELF OR YOUR FAMILY DOWN: 0
3. TROUBLE FALLING OR STAYING ASLEEP: 0
8. MOVING OR SPEAKING SO SLOWLY THAT OTHER PEOPLE COULD HAVE NOTICED. OR THE OPPOSITE, BEING SO FIGETY OR RESTLESS THAT YOU HAVE BEEN MOVING AROUND A LOT MORE THAN USUAL: 0
SUM OF ALL RESPONSES TO PHQ QUESTIONS 1-9: 0
SUM OF ALL RESPONSES TO PHQ QUESTIONS 1-9: 0
1. LITTLE INTEREST OR PLEASURE IN DOING THINGS: 0

## 2023-09-22 DIAGNOSIS — I10 ESSENTIAL HYPERTENSION: ICD-10-CM

## 2023-09-22 RX ORDER — LISINOPRIL 10 MG/1
10 TABLET ORAL DAILY
Qty: 90 TABLET | Refills: 1 | Status: SHIPPED | OUTPATIENT
Start: 2023-09-22

## 2023-11-09 ENCOUNTER — OFFICE VISIT (OUTPATIENT)
Dept: FAMILY MEDICINE CLINIC | Age: 73
End: 2023-11-09

## 2023-11-09 VITALS
DIASTOLIC BLOOD PRESSURE: 64 MMHG | TEMPERATURE: 97.1 F | HEIGHT: 69 IN | WEIGHT: 201 LBS | SYSTOLIC BLOOD PRESSURE: 114 MMHG | BODY MASS INDEX: 29.77 KG/M2 | OXYGEN SATURATION: 96 % | HEART RATE: 61 BPM

## 2023-11-09 DIAGNOSIS — H69.83 EUSTACHIAN TUBE DYSFUNCTION, BILATERAL: ICD-10-CM

## 2023-11-09 DIAGNOSIS — R05.1 ACUTE COUGH: Primary | ICD-10-CM

## 2023-11-09 RX ORDER — IPRATROPIUM BROMIDE 21 UG/1
2 SPRAY, METERED NASAL EVERY 12 HOURS
Qty: 30 ML | Refills: 3 | Status: SHIPPED | OUTPATIENT
Start: 2023-11-09

## 2023-11-09 ASSESSMENT — PATIENT HEALTH QUESTIONNAIRE - PHQ9
6. FEELING BAD ABOUT YOURSELF - OR THAT YOU ARE A FAILURE OR HAVE LET YOURSELF OR YOUR FAMILY DOWN: 0
4. FEELING TIRED OR HAVING LITTLE ENERGY: 0
SUM OF ALL RESPONSES TO PHQ QUESTIONS 1-9: 0
3. TROUBLE FALLING OR STAYING ASLEEP: 0
9. THOUGHTS THAT YOU WOULD BE BETTER OFF DEAD, OR OF HURTING YOURSELF: 0
SUM OF ALL RESPONSES TO PHQ QUESTIONS 1-9: 0
SUM OF ALL RESPONSES TO PHQ9 QUESTIONS 1 & 2: 0
1. LITTLE INTEREST OR PLEASURE IN DOING THINGS: 0
10. IF YOU CHECKED OFF ANY PROBLEMS, HOW DIFFICULT HAVE THESE PROBLEMS MADE IT FOR YOU TO DO YOUR WORK, TAKE CARE OF THINGS AT HOME, OR GET ALONG WITH OTHER PEOPLE: 0
7. TROUBLE CONCENTRATING ON THINGS, SUCH AS READING THE NEWSPAPER OR WATCHING TELEVISION: 0
8. MOVING OR SPEAKING SO SLOWLY THAT OTHER PEOPLE COULD HAVE NOTICED. OR THE OPPOSITE, BEING SO FIGETY OR RESTLESS THAT YOU HAVE BEEN MOVING AROUND A LOT MORE THAN USUAL: 0
SUM OF ALL RESPONSES TO PHQ QUESTIONS 1-9: 0
2. FEELING DOWN, DEPRESSED OR HOPELESS: 0
5. POOR APPETITE OR OVEREATING: 0
SUM OF ALL RESPONSES TO PHQ QUESTIONS 1-9: 0

## 2023-11-09 ASSESSMENT — ENCOUNTER SYMPTOMS
SHORTNESS OF BREATH: 0
COUGH: 1
SORE THROAT: 0
ABDOMINAL PAIN: 0
RHINORRHEA: 0
EYE PAIN: 0
NAUSEA: 0

## 2023-11-09 ASSESSMENT — COLUMBIA-SUICIDE SEVERITY RATING SCALE - C-SSRS
7. DID THIS OCCUR IN THE LAST THREE MONTHS: NO
3. HAVE YOU BEEN THINKING ABOUT HOW YOU MIGHT KILL YOURSELF?: NO
4. HAVE YOU HAD THESE THOUGHTS AND HAD SOME INTENTION OF ACTING ON THEM?: NO
5. HAVE YOU STARTED TO WORK OUT OR WORKED OUT THE DETAILS OF HOW TO KILL YOURSELF? DO YOU INTEND TO CARRY OUT THIS PLAN?: NO

## 2023-11-09 NOTE — PROGRESS NOTES
Judith Ordaz is a 68 y.o.male who presents with   Chief Complaint   Patient presents with    Ear Fullness     Bilateral ear pressure, nasal drainage, slightly wet cough-- thick green mucous. Denies any fevers   . New problem of nighttime cough and sensation of fullness in his ears. Reports a history of seasonal allergies. States that every time around this time every year he develops similar symptoms. States that he is currently taking Zyrtec and Flonase at home. He says he has been taking Tessalon Perles for the cough at night. Ear Fullness   There is pain in both ears. The problem has been unchanged. There has been no fever. Associated symptoms include coughing. Pertinent negatives include no abdominal pain, headaches, rash, rhinorrhea or sore throat. Cough  This is a new problem. The current episode started 1 to 4 weeks ago. The problem has been unchanged. The cough is Productive of sputum. Associated symptoms include ear congestion, nasal congestion and postnasal drip. Pertinent negatives include no chest pain, chills, ear pain, fever, headaches, myalgias, rash, rhinorrhea, sore throat or shortness of breath. Review of Systems   Constitutional:  Negative for chills, fatigue and fever. HENT:  Positive for postnasal drip. Negative for ear pain, rhinorrhea and sore throat. Eyes:  Negative for pain and visual disturbance. Respiratory:  Positive for cough. Negative for shortness of breath. Cardiovascular:  Negative for chest pain. Gastrointestinal:  Negative for abdominal pain and nausea. Genitourinary:  Negative for dysuria. Musculoskeletal:  Negative for myalgias. Skin:  Negative for pallor and rash. Neurological:  Negative for dizziness, light-headedness and headaches.         Past Medical History:   Diagnosis Date    Anal fissure 08/2022    AV block, 1st degree     CAD (coronary artery disease) 04/2011    Dr. Bill Thakkar    Calculus of kidney 12/01/2010    Chondromalacia of

## 2023-12-08 LAB — PSA SERPL DL<=0.01 NG/ML-MCNC: 6.81 NG/ML (ref 0–4)

## 2023-12-11 ENCOUNTER — OFFICE VISIT (OUTPATIENT)
Dept: ENT CLINIC | Age: 73
End: 2023-12-11
Payer: MEDICARE

## 2023-12-11 VITALS
HEART RATE: 57 BPM | TEMPERATURE: 97.3 F | WEIGHT: 201 LBS | HEIGHT: 69 IN | SYSTOLIC BLOOD PRESSURE: 147 MMHG | DIASTOLIC BLOOD PRESSURE: 75 MMHG | BODY MASS INDEX: 29.77 KG/M2

## 2023-12-11 DIAGNOSIS — H91.91 HEARING LOSS OF RIGHT EAR, UNSPECIFIED HEARING LOSS TYPE: ICD-10-CM

## 2023-12-11 DIAGNOSIS — R09.A2 GLOBUS PHARYNGEUS: Primary | ICD-10-CM

## 2023-12-11 DIAGNOSIS — K21.9 LARYNGOPHARYNGEAL REFLUX (LPR): ICD-10-CM

## 2023-12-11 PROCEDURE — G8417 CALC BMI ABV UP PARAM F/U: HCPCS | Performed by: OTOLARYNGOLOGY

## 2023-12-11 PROCEDURE — G8427 DOCREV CUR MEDS BY ELIG CLIN: HCPCS | Performed by: OTOLARYNGOLOGY

## 2023-12-11 PROCEDURE — 3078F DIAST BP <80 MM HG: CPT | Performed by: OTOLARYNGOLOGY

## 2023-12-11 PROCEDURE — 3077F SYST BP >= 140 MM HG: CPT | Performed by: OTOLARYNGOLOGY

## 2023-12-11 PROCEDURE — 1036F TOBACCO NON-USER: CPT | Performed by: OTOLARYNGOLOGY

## 2023-12-11 PROCEDURE — G8484 FLU IMMUNIZE NO ADMIN: HCPCS | Performed by: OTOLARYNGOLOGY

## 2023-12-11 PROCEDURE — 99204 OFFICE O/P NEW MOD 45 MIN: CPT | Performed by: OTOLARYNGOLOGY

## 2023-12-11 PROCEDURE — 3017F COLORECTAL CA SCREEN DOC REV: CPT | Performed by: OTOLARYNGOLOGY

## 2023-12-11 PROCEDURE — 1123F ACP DISCUSS/DSCN MKR DOCD: CPT | Performed by: OTOLARYNGOLOGY

## 2023-12-11 RX ORDER — PANTOPRAZOLE SODIUM 40 MG/1
40 TABLET, DELAYED RELEASE ORAL
Qty: 30 TABLET | Refills: 5 | Status: SHIPPED | OUTPATIENT
Start: 2023-12-11 | End: 2023-12-12 | Stop reason: SDUPTHER

## 2023-12-11 NOTE — PROGRESS NOTES
CHIEF COMPLAINT: Phlegm in his throat    HISTORY OF PRESENT ILLNESS:  68 y.o. male who presents with 6 weeks of drainage in his throat. Worse recumbent. Some throat clearing. No nasal obstruction. Little facial pain. Some anterior rhinorrhea. Steroid did not help. On ipratroprium nasal. Not much better. Also concerned with hearing loss. Bilateral but the right is worse than the left. Has some tinnitus in both ears as well. No ear pain or otorrhea. PAST MEDICAL HISTORY:   Social History     Tobacco Use   Smoking Status Never   Smokeless Tobacco Never                                                    Social History     Substance and Sexual Activity   Alcohol Use Yes    Alcohol/week: 12.0 standard drinks of alcohol    Types: 12 Cans of beer per week    Comment: weekly                                                    Current Outpatient Medications:     ipratropium (ATROVENT) 0.03 % nasal spray, 2 sprays by Each Nostril route in the morning and 2 sprays in the evening., Disp: 30 mL, Rfl: 3    lisinopril (PRINIVIL;ZESTRIL) 10 MG tablet, TAKE 1 TABLET BY MOUTH IN THE MORNING, Disp: 90 tablet, Rfl: 1    fluconazole (DIFLUCAN) 200 MG tablet, TAKE 1 TABLET BY MOUTH EVERY WEEK, Disp: , Rfl:     atorvastatin (LIPITOR) 40 MG tablet, Take 1 tablet by mouth daily, Disp: 90 tablet, Rfl: 1    metoprolol succinate (TOPROL XL) 25 MG extended release tablet, TAKE 1 TABLET BY MOUTH IN THE MORNING, Disp: 90 tablet, Rfl: 1    blood glucose monitor strips, Test 1 times a day & as needed for symptoms of fluctuating blood glucose. Dispense sufficient amount ., Disp: 100 strip, Rfl: 5    Lancets MISC, Test 1 times a day & as needed for symptoms of fluctuating blood glucose. Dispense sufficient amount ., Disp: 100 each, Rfl: 5    blood glucose monitor kit and supplies, Check Blood sugars and prn symptoms of fluctuating Blood sugars . , Disp: 1 kit, Rfl: 0    sertraline (ZOLOFT) 50 MG tablet, TAKE 1 TABLET BY MOUTH DAILY, Disp: 90

## 2023-12-12 DIAGNOSIS — K21.9 LARYNGOPHARYNGEAL REFLUX (LPR): ICD-10-CM

## 2023-12-12 DIAGNOSIS — R09.A2 GLOBUS PHARYNGEUS: ICD-10-CM

## 2023-12-12 RX ORDER — PANTOPRAZOLE SODIUM 40 MG/1
40 TABLET, DELAYED RELEASE ORAL
Qty: 90 TABLET | Refills: 1 | Status: SHIPPED | OUTPATIENT
Start: 2023-12-12 | End: 2024-03-11

## 2023-12-15 ENCOUNTER — PROCEDURE VISIT (OUTPATIENT)
Dept: AUDIOLOGY | Age: 73
End: 2023-12-15

## 2023-12-15 ENCOUNTER — OFFICE VISIT (OUTPATIENT)
Dept: ENT CLINIC | Age: 73
End: 2023-12-15
Payer: MEDICARE

## 2023-12-15 VITALS
TEMPERATURE: 97.3 F | HEIGHT: 69 IN | HEART RATE: 55 BPM | DIASTOLIC BLOOD PRESSURE: 68 MMHG | WEIGHT: 201.4 LBS | BODY MASS INDEX: 29.83 KG/M2 | SYSTOLIC BLOOD PRESSURE: 123 MMHG

## 2023-12-15 DIAGNOSIS — H93.13 TINNITUS OF BOTH EARS: ICD-10-CM

## 2023-12-15 DIAGNOSIS — R09.A2 GLOBUS PHARYNGEUS: Primary | ICD-10-CM

## 2023-12-15 DIAGNOSIS — H90.3 SENSORINEURAL HEARING LOSS, BILATERAL: ICD-10-CM

## 2023-12-15 DIAGNOSIS — H90.3 SENSORINEURAL HEARING LOSS (SNHL) OF BOTH EARS: Primary | ICD-10-CM

## 2023-12-15 DIAGNOSIS — R42 DIZZINESS AND GIDDINESS: ICD-10-CM

## 2023-12-15 PROCEDURE — 3017F COLORECTAL CA SCREEN DOC REV: CPT | Performed by: OTOLARYNGOLOGY

## 2023-12-15 PROCEDURE — 1123F ACP DISCUSS/DSCN MKR DOCD: CPT | Performed by: OTOLARYNGOLOGY

## 2023-12-15 PROCEDURE — 1036F TOBACCO NON-USER: CPT | Performed by: OTOLARYNGOLOGY

## 2023-12-15 PROCEDURE — G8484 FLU IMMUNIZE NO ADMIN: HCPCS | Performed by: OTOLARYNGOLOGY

## 2023-12-15 PROCEDURE — G8417 CALC BMI ABV UP PARAM F/U: HCPCS | Performed by: OTOLARYNGOLOGY

## 2023-12-15 PROCEDURE — 3078F DIAST BP <80 MM HG: CPT | Performed by: OTOLARYNGOLOGY

## 2023-12-15 PROCEDURE — 3074F SYST BP LT 130 MM HG: CPT | Performed by: OTOLARYNGOLOGY

## 2023-12-15 PROCEDURE — 99212 OFFICE O/P EST SF 10 MIN: CPT | Performed by: OTOLARYNGOLOGY

## 2023-12-15 PROCEDURE — G8427 DOCREV CUR MEDS BY ELIG CLIN: HCPCS | Performed by: OTOLARYNGOLOGY

## 2023-12-15 RX ORDER — SERTRALINE HYDROCHLORIDE 25 MG/1
25 TABLET, FILM COATED ORAL DAILY
COMMUNITY

## 2023-12-15 NOTE — PROGRESS NOTES
Chacorta Vazquez   1950, 68 y.o. male   [id number]       Referring Provider: Romel Heredia MD  Referral Type:  Referring provider encounter note    Reason for Visit: Evaluation of suspected change in hearing, tinnitus, or balance. ADULT AUDIOLOGIC EVALUATION      Chacorta Vazquez is a 68 y.o. male seen today, 12/15/2023, for a recheck audiologic evaluation; previous audiogram from 2/3/2021 in media tab. AUDIOLOGIC AND OTHER PERTINENT MEDICAL HISTORY:        Chacorta Vazquez noted decreased hearing bilaterally, right is worse than left; tinnitus bilaterally; has had fullness in ears, feeling like his head is under water, has improved, but hearing may seem a bit reduced; some imbalance. Ness Del Valle denied otalgia, otorrhea, and dizziness. IMPRESSIONS:       Today's results are consistent with bilateral sensorineural eharing loss with excellent word recogntion for both ears; right ear with negative middle ear pressure and left ear with hypermobile TM. Hearing loss is significant enough to result in difficulty understanding speech in at least some listening environments. Follow medical recommendations from Dr. Suze Aponte. ASSESSMENT AND FINDINGS:       Otoscopy revealed: Clear ear canals bilaterally      RIGHT EAR:  Hearing Sensitivity: Within normal limits through 2000 Hz steeply sloping to moderately-severe sensorineural hearing loss. Speech Recognition Threshold: 20 dBHL  Word Recognition: Excellent (96%), based on NU-6 25-word list at 50 dBHL using recorded speech stimuli. Tympanometry: Negative peak pressure with normal compliance, Type C tympanogram, consistent with ETD/history of otitis media. LEFT EAR:  Hearing Sensitivity: Within normal limits to mild through 2000 Hz steeply sloping to severe sensorineural hearing loss. Speech Recognition Threshold: 20 dBHL  Word Recognition: Excellent (100%), based on NU-6 25-word list at 50 dBHL using recorded speech stimuli.

## 2023-12-15 NOTE — PROGRESS NOTES
FOLLOW UP VISIT:    CHIEF COMPLAINT: Phlegm in the throat. INTERIM HISTORY: Seen earlier this week with a globus sensation and rhinorrhea which I have treated with steroids and ipratropium nasal spray but with little improvement. I suspect that some of his throat symptoms may be due to extra esophageal reflux disease and I started him on omeprazole 40 mg to be taken prior to his evening meal.  In addition he was concerned about hearing loss, right greater than left along with some tinnitus and both ears. Audiogram was ordered. PAST MEDICAL HISTORY:   Social History     Tobacco Use   Smoking Status Never   Smokeless Tobacco Never                                                    Social History     Substance and Sexual Activity   Alcohol Use Yes    Alcohol/week: 12.0 standard drinks of alcohol    Types: 12 Cans of beer per week    Comment: weekly                                                    Current Outpatient Medications:     sertraline (ZOLOFT) 25 MG tablet, Take 1 tablet by mouth daily, Disp: , Rfl:     pantoprazole (PROTONIX) 40 MG tablet, Take 1 tablet by mouth Daily with supper, Disp: 90 tablet, Rfl: 1    lisinopril (PRINIVIL;ZESTRIL) 10 MG tablet, TAKE 1 TABLET BY MOUTH IN THE MORNING, Disp: 90 tablet, Rfl: 1    atorvastatin (LIPITOR) 40 MG tablet, Take 1 tablet by mouth daily, Disp: 90 tablet, Rfl: 1    metoprolol succinate (TOPROL XL) 25 MG extended release tablet, TAKE 1 TABLET BY MOUTH IN THE MORNING, Disp: 90 tablet, Rfl: 1    blood glucose monitor strips, Test 1 times a day & as needed for symptoms of fluctuating blood glucose. Dispense sufficient amount ., Disp: 100 strip, Rfl: 5    Lancets MISC, Test 1 times a day & as needed for symptoms of fluctuating blood glucose. Dispense sufficient amount ., Disp: 100 each, Rfl: 5    blood glucose monitor kit and supplies, Check Blood sugars and prn symptoms of fluctuating Blood sugars . , Disp: 1 kit, Rfl: 0    sildenafil (REVATIO) 20 MG tablet,

## 2024-01-17 DIAGNOSIS — I10 ESSENTIAL HYPERTENSION: ICD-10-CM

## 2024-01-18 RX ORDER — METOPROLOL SUCCINATE 25 MG/1
25 TABLET, EXTENDED RELEASE ORAL DAILY
Qty: 90 TABLET | Refills: 1 | Status: SHIPPED | OUTPATIENT
Start: 2024-01-18

## 2024-01-31 DIAGNOSIS — E78.00 PURE HYPERCHOLESTEROLEMIA: ICD-10-CM

## 2024-02-02 RX ORDER — ATORVASTATIN CALCIUM 20 MG/1
20 TABLET, FILM COATED ORAL DAILY
Qty: 90 TABLET | Refills: 1 | OUTPATIENT
Start: 2024-02-02

## 2024-03-13 DIAGNOSIS — E78.00 PURE HYPERCHOLESTEROLEMIA: ICD-10-CM

## 2024-03-14 RX ORDER — ATORVASTATIN CALCIUM 40 MG/1
40 TABLET, FILM COATED ORAL DAILY
Qty: 30 TABLET | Refills: 1 | Status: SHIPPED | OUTPATIENT
Start: 2024-03-14

## 2024-04-01 ENCOUNTER — TELEPHONE (OUTPATIENT)
Dept: FAMILY MEDICINE CLINIC | Age: 74
End: 2024-04-01

## 2024-04-09 SDOH — ECONOMIC STABILITY: FOOD INSECURITY: WITHIN THE PAST 12 MONTHS, YOU WORRIED THAT YOUR FOOD WOULD RUN OUT BEFORE YOU GOT MONEY TO BUY MORE.: NEVER TRUE

## 2024-04-09 SDOH — ECONOMIC STABILITY: TRANSPORTATION INSECURITY
IN THE PAST 12 MONTHS, HAS LACK OF TRANSPORTATION KEPT YOU FROM MEETINGS, WORK, OR FROM GETTING THINGS NEEDED FOR DAILY LIVING?: NO

## 2024-04-09 SDOH — ECONOMIC STABILITY: FOOD INSECURITY: WITHIN THE PAST 12 MONTHS, THE FOOD YOU BOUGHT JUST DIDN'T LAST AND YOU DIDN'T HAVE MONEY TO GET MORE.: NEVER TRUE

## 2024-04-09 SDOH — ECONOMIC STABILITY: INCOME INSECURITY: HOW HARD IS IT FOR YOU TO PAY FOR THE VERY BASICS LIKE FOOD, HOUSING, MEDICAL CARE, AND HEATING?: NOT HARD AT ALL

## 2024-04-09 ASSESSMENT — PATIENT HEALTH QUESTIONNAIRE - PHQ9
SUM OF ALL RESPONSES TO PHQ QUESTIONS 1-9: 1
1. LITTLE INTEREST OR PLEASURE IN DOING THINGS: NOT AT ALL
6. FEELING BAD ABOUT YOURSELF - OR THAT YOU ARE A FAILURE OR HAVE LET YOURSELF OR YOUR FAMILY DOWN: NOT AT ALL
9. THOUGHTS THAT YOU WOULD BE BETTER OFF DEAD, OR OF HURTING YOURSELF: NOT AT ALL
3. TROUBLE FALLING OR STAYING ASLEEP: SEVERAL DAYS
3. TROUBLE FALLING OR STAYING ASLEEP: SEVERAL DAYS
SUM OF ALL RESPONSES TO PHQ QUESTIONS 1-9: 1
8. MOVING OR SPEAKING SO SLOWLY THAT OTHER PEOPLE COULD HAVE NOTICED. OR THE OPPOSITE, BEING SO FIGETY OR RESTLESS THAT YOU HAVE BEEN MOVING AROUND A LOT MORE THAN USUAL: NOT AT ALL
7. TROUBLE CONCENTRATING ON THINGS, SUCH AS READING THE NEWSPAPER OR WATCHING TELEVISION: NOT AT ALL
10. IF YOU CHECKED OFF ANY PROBLEMS, HOW DIFFICULT HAVE THESE PROBLEMS MADE IT FOR YOU TO DO YOUR WORK, TAKE CARE OF THINGS AT HOME, OR GET ALONG WITH OTHER PEOPLE: NOT DIFFICULT AT ALL
SUM OF ALL RESPONSES TO PHQ9 QUESTIONS 1 & 2: 0
5. POOR APPETITE OR OVEREATING: NOT AT ALL
7. TROUBLE CONCENTRATING ON THINGS, SUCH AS READING THE NEWSPAPER OR WATCHING TELEVISION: NOT AT ALL
9. THOUGHTS THAT YOU WOULD BE BETTER OFF DEAD, OR OF HURTING YOURSELF: NOT AT ALL
SUM OF ALL RESPONSES TO PHQ QUESTIONS 1-9: 1
5. POOR APPETITE OR OVEREATING: NOT AT ALL
2. FEELING DOWN, DEPRESSED OR HOPELESS: NOT AT ALL
10. IF YOU CHECKED OFF ANY PROBLEMS, HOW DIFFICULT HAVE THESE PROBLEMS MADE IT FOR YOU TO DO YOUR WORK, TAKE CARE OF THINGS AT HOME, OR GET ALONG WITH OTHER PEOPLE: NOT DIFFICULT AT ALL
4. FEELING TIRED OR HAVING LITTLE ENERGY: NOT AT ALL
SUM OF ALL RESPONSES TO PHQ QUESTIONS 1-9: 1
1. LITTLE INTEREST OR PLEASURE IN DOING THINGS: NOT AT ALL
2. FEELING DOWN, DEPRESSED OR HOPELESS: NOT AT ALL
6. FEELING BAD ABOUT YOURSELF - OR THAT YOU ARE A FAILURE OR HAVE LET YOURSELF OR YOUR FAMILY DOWN: NOT AT ALL
SUM OF ALL RESPONSES TO PHQ QUESTIONS 1-9: 1
8. MOVING OR SPEAKING SO SLOWLY THAT OTHER PEOPLE COULD HAVE NOTICED. OR THE OPPOSITE - BEING SO FIDGETY OR RESTLESS THAT YOU HAVE BEEN MOVING AROUND A LOT MORE THAN USUAL: NOT AT ALL
4. FEELING TIRED OR HAVING LITTLE ENERGY: NOT AT ALL

## 2024-04-10 ENCOUNTER — OFFICE VISIT (OUTPATIENT)
Dept: FAMILY MEDICINE CLINIC | Age: 74
End: 2024-04-10
Payer: MEDICARE

## 2024-04-10 VITALS
SYSTOLIC BLOOD PRESSURE: 130 MMHG | OXYGEN SATURATION: 98 % | DIASTOLIC BLOOD PRESSURE: 74 MMHG | TEMPERATURE: 97.1 F | BODY MASS INDEX: 30.87 KG/M2 | HEART RATE: 56 BPM | WEIGHT: 208.4 LBS | HEIGHT: 69 IN

## 2024-04-10 DIAGNOSIS — I25.10 CORONARY ARTERY DISEASE INVOLVING NATIVE CORONARY ARTERY OF NATIVE HEART WITHOUT ANGINA PECTORIS: ICD-10-CM

## 2024-04-10 DIAGNOSIS — I48.91 ATRIAL FIBRILLATION, UNSPECIFIED TYPE (HCC): ICD-10-CM

## 2024-04-10 DIAGNOSIS — J30.9 CHRONIC ALLERGIC RHINITIS: ICD-10-CM

## 2024-04-10 DIAGNOSIS — E78.5 TYPE 2 DIABETES MELLITUS WITH HYPERLIPIDEMIA (HCC): Primary | ICD-10-CM

## 2024-04-10 DIAGNOSIS — F41.9 ANXIETY: ICD-10-CM

## 2024-04-10 DIAGNOSIS — I10 ESSENTIAL HYPERTENSION: ICD-10-CM

## 2024-04-10 DIAGNOSIS — E11.69 TYPE 2 DIABETES MELLITUS WITH HYPERLIPIDEMIA (HCC): Primary | ICD-10-CM

## 2024-04-10 PROBLEM — G47.9 DISTURBANCE IN SLEEP BEHAVIOR: Status: RESOLVED | Noted: 2023-09-11 | Resolved: 2024-04-10

## 2024-04-10 PROBLEM — S52.515A CLOSED NONDISPLACED FRACTURE OF STYLOID PROCESS OF LEFT RADIUS: Status: RESOLVED | Noted: 2021-10-01 | Resolved: 2024-04-10

## 2024-04-10 PROCEDURE — 3078F DIAST BP <80 MM HG: CPT | Performed by: FAMILY MEDICINE

## 2024-04-10 PROCEDURE — 99214 OFFICE O/P EST MOD 30 MIN: CPT | Performed by: FAMILY MEDICINE

## 2024-04-10 PROCEDURE — 3017F COLORECTAL CA SCREEN DOC REV: CPT | Performed by: FAMILY MEDICINE

## 2024-04-10 PROCEDURE — 3075F SYST BP GE 130 - 139MM HG: CPT | Performed by: FAMILY MEDICINE

## 2024-04-10 PROCEDURE — 3046F HEMOGLOBIN A1C LEVEL >9.0%: CPT | Performed by: FAMILY MEDICINE

## 2024-04-10 PROCEDURE — 1123F ACP DISCUSS/DSCN MKR DOCD: CPT | Performed by: FAMILY MEDICINE

## 2024-04-10 PROCEDURE — 2022F DILAT RTA XM EVC RTNOPTHY: CPT | Performed by: FAMILY MEDICINE

## 2024-04-10 PROCEDURE — G8417 CALC BMI ABV UP PARAM F/U: HCPCS | Performed by: FAMILY MEDICINE

## 2024-04-10 PROCEDURE — G8427 DOCREV CUR MEDS BY ELIG CLIN: HCPCS | Performed by: FAMILY MEDICINE

## 2024-04-10 PROCEDURE — 1036F TOBACCO NON-USER: CPT | Performed by: FAMILY MEDICINE

## 2024-04-15 DIAGNOSIS — E11.69 TYPE 2 DIABETES MELLITUS WITH HYPERLIPIDEMIA (HCC): ICD-10-CM

## 2024-04-15 DIAGNOSIS — E78.5 TYPE 2 DIABETES MELLITUS WITH HYPERLIPIDEMIA (HCC): ICD-10-CM

## 2024-04-15 NOTE — PROGRESS NOTES
Portions of this chart may have been created with voice recognition software. Occasional wrong-word or \"sound-alike\" substitutions may have occurred due to the inherent limitations of voice recognition software. Read the chart carefully and recognize, using context, where these substitutions have occurred        Chief Complaint     Medication Check and Follow-up               SUBJECTIVE    Luis E Del Valle is a 73 y.o. male here for Medication Check and Follow-up           1. Type 2 diabetes mellitus with hyperlipidemia (HCC)    Diabetic ROS -recently uncontrolled, patient was supposed to follow-up from the last office visit however he has been trying to make adjustments and lifestyle modifications to to help with the blood sugars and not on medications at this point.  Recheck A1c at this time.  home glucose monitoring: is not performed,   further diabetic ROS: no polyuria or polydipsia, no chest pain, dyspnea or TIA's, no numbness, tingling or pain in extremities, no unusual visual symptoms, no   hypoglycemia, no medication side effects noted,     - Hemoglobin A1C; Future  - Comprehensive Metabolic Panel; Future    2. Essential hypertension      Hypertension ROS: taking medications as instructed, no medication side effects noted, no TIA's, no chest pain on exertion, no dyspnea on exertion, no swelling of ankles, no orthostatic dizziness or lightheadedness, no orthopnea or paroxysmal nocturnal dyspnea, no palpitations, no intermittent claudication symptoms.  New concerns: none.  Plan: current treatment plan is effective, no change in therapy  lab results and schedule of future lab studies reviewed with patient     3. Coronary artery disease involving native coronary artery of native heart without angina pectoris    Cardiovascular risk analysis - The 10-year ASCVD risk score (Estevan ESCAMILLA, et al., 2019) is: 42.7%    Values used to calculate the score:      Age: 73 years      Sex: Male      Is Non-

## 2024-04-16 LAB
ALBUMIN SERPL-MCNC: 4.7 G/DL (ref 3.4–5)
ALBUMIN/GLOB SERPL: 2 {RATIO} (ref 1.1–2.2)
ALP SERPL-CCNC: 77 U/L (ref 40–129)
ALT SERPL-CCNC: 77 U/L (ref 10–40)
ANION GAP SERPL CALCULATED.3IONS-SCNC: 12 MMOL/L (ref 3–16)
AST SERPL-CCNC: 76 U/L (ref 15–37)
BILIRUB SERPL-MCNC: 1 MG/DL (ref 0–1)
BUN SERPL-MCNC: 13 MG/DL (ref 7–20)
CALCIUM SERPL-MCNC: 9.5 MG/DL (ref 8.3–10.6)
CHLORIDE SERPL-SCNC: 105 MMOL/L (ref 99–110)
CO2 SERPL-SCNC: 27 MMOL/L (ref 21–32)
CREAT SERPL-MCNC: 1.1 MG/DL (ref 0.8–1.3)
EST. AVERAGE GLUCOSE BLD GHB EST-MCNC: 188.6 MG/DL
GFR SERPLBLD CREATININE-BSD FMLA CKD-EPI: 71 ML/MIN/{1.73_M2}
GLUCOSE SERPL-MCNC: 117 MG/DL (ref 70–99)
HBA1C MFR BLD: 8.2 %
POTASSIUM SERPL-SCNC: 4.2 MMOL/L (ref 3.5–5.1)
PROT SERPL-MCNC: 7 G/DL (ref 6.4–8.2)
SODIUM SERPL-SCNC: 144 MMOL/L (ref 136–145)

## 2024-05-31 DIAGNOSIS — E78.00 PURE HYPERCHOLESTEROLEMIA: ICD-10-CM

## 2024-06-01 DIAGNOSIS — E78.00 PURE HYPERCHOLESTEROLEMIA: ICD-10-CM

## 2024-06-03 RX ORDER — ATORVASTATIN CALCIUM 40 MG/1
40 TABLET, FILM COATED ORAL DAILY
Qty: 90 TABLET | Refills: 1 | Status: SHIPPED | OUTPATIENT
Start: 2024-06-03

## 2024-06-03 RX ORDER — ATORVASTATIN CALCIUM 40 MG/1
40 TABLET, FILM COATED ORAL DAILY
Qty: 30 TABLET | Refills: 1 | OUTPATIENT
Start: 2024-06-03

## 2024-06-03 NOTE — TELEPHONE ENCOUNTER
Requested Prescriptions     Pending Prescriptions Disp Refills    atorvastatin (LIPITOR) 40 MG tablet 30 tablet 1     Sig: Take 1 tablet by mouth daily          Last Office Visit: 4/10/2024    Next Office Visit: Visit date not found

## 2024-06-24 DIAGNOSIS — I10 ESSENTIAL HYPERTENSION: ICD-10-CM

## 2024-06-24 NOTE — TELEPHONE ENCOUNTER
Requested Prescriptions     Pending Prescriptions Disp Refills    lisinopril (PRINIVIL;ZESTRIL) 10 MG tablet [Pharmacy Med Name: LISINOPRIL 10MG TABLETS] 90 tablet 1     Sig: TAKE 1 TABLET BY MOUTH IN THE MORNING     LOV 4.10.24

## 2024-06-25 RX ORDER — LISINOPRIL 10 MG/1
10 TABLET ORAL DAILY
Qty: 90 TABLET | Refills: 0 | Status: SHIPPED | OUTPATIENT
Start: 2024-06-25

## 2024-06-28 DIAGNOSIS — I10 ESSENTIAL HYPERTENSION: ICD-10-CM

## 2024-06-28 RX ORDER — METOPROLOL SUCCINATE 25 MG/1
25 TABLET, EXTENDED RELEASE ORAL DAILY
Qty: 90 TABLET | Refills: 1 | Status: SHIPPED | OUTPATIENT
Start: 2024-06-28

## 2024-06-28 NOTE — TELEPHONE ENCOUNTER
Requested Prescriptions     Pending Prescriptions Disp Refills    metoprolol succinate (TOPROL XL) 25 MG extended release tablet [Pharmacy Med Name: METOPROLOL ER SUCCINATE 25MG TABS] 90 tablet 1     Sig: TAKE 1 TABLET BY MOUTH DAILY         Last OV: 4/10/2024    Last labs: 4/15/2024     F/u: Visit date not found

## 2024-07-26 SDOH — HEALTH STABILITY: PHYSICAL HEALTH: ON AVERAGE, HOW MANY DAYS PER WEEK DO YOU ENGAGE IN MODERATE TO STRENUOUS EXERCISE (LIKE A BRISK WALK)?: 6 DAYS

## 2024-07-26 SDOH — HEALTH STABILITY: PHYSICAL HEALTH: ON AVERAGE, HOW MANY MINUTES DO YOU ENGAGE IN EXERCISE AT THIS LEVEL?: 130 MIN

## 2024-07-29 ENCOUNTER — OFFICE VISIT (OUTPATIENT)
Dept: FAMILY MEDICINE CLINIC | Age: 74
End: 2024-07-29
Payer: MEDICARE

## 2024-07-29 VITALS
SYSTOLIC BLOOD PRESSURE: 120 MMHG | WEIGHT: 195 LBS | HEART RATE: 61 BPM | DIASTOLIC BLOOD PRESSURE: 62 MMHG | BODY MASS INDEX: 28.8 KG/M2 | OXYGEN SATURATION: 94 %

## 2024-07-29 DIAGNOSIS — I10 ESSENTIAL HYPERTENSION: ICD-10-CM

## 2024-07-29 DIAGNOSIS — E11.69 TYPE 2 DIABETES MELLITUS WITH HYPERLIPIDEMIA (HCC): ICD-10-CM

## 2024-07-29 DIAGNOSIS — R39.16 STRAINING TO VOID: ICD-10-CM

## 2024-07-29 DIAGNOSIS — E78.5 TYPE 2 DIABETES MELLITUS WITH HYPERLIPIDEMIA (HCC): ICD-10-CM

## 2024-07-29 DIAGNOSIS — R79.9 ABNORMAL FINDING OF BLOOD CHEMISTRY, UNSPECIFIED: ICD-10-CM

## 2024-07-29 DIAGNOSIS — R73.03 PREDIABETES: ICD-10-CM

## 2024-07-29 DIAGNOSIS — I25.10 CORONARY ARTERY DISEASE INVOLVING NATIVE CORONARY ARTERY OF NATIVE HEART WITHOUT ANGINA PECTORIS: ICD-10-CM

## 2024-07-29 DIAGNOSIS — Z76.89 ESTABLISHING CARE WITH NEW DOCTOR, ENCOUNTER FOR: Primary | ICD-10-CM

## 2024-07-29 LAB — HBA1C MFR BLD: 9 %

## 2024-07-29 PROCEDURE — 3052F HG A1C>EQUAL 8.0%<EQUAL 9.0%: CPT | Performed by: STUDENT IN AN ORGANIZED HEALTH CARE EDUCATION/TRAINING PROGRAM

## 2024-07-29 PROCEDURE — 99214 OFFICE O/P EST MOD 30 MIN: CPT | Performed by: STUDENT IN AN ORGANIZED HEALTH CARE EDUCATION/TRAINING PROGRAM

## 2024-07-29 PROCEDURE — 3017F COLORECTAL CA SCREEN DOC REV: CPT | Performed by: STUDENT IN AN ORGANIZED HEALTH CARE EDUCATION/TRAINING PROGRAM

## 2024-07-29 PROCEDURE — 3074F SYST BP LT 130 MM HG: CPT | Performed by: STUDENT IN AN ORGANIZED HEALTH CARE EDUCATION/TRAINING PROGRAM

## 2024-07-29 PROCEDURE — 2022F DILAT RTA XM EVC RTNOPTHY: CPT | Performed by: STUDENT IN AN ORGANIZED HEALTH CARE EDUCATION/TRAINING PROGRAM

## 2024-07-29 PROCEDURE — G8427 DOCREV CUR MEDS BY ELIG CLIN: HCPCS | Performed by: STUDENT IN AN ORGANIZED HEALTH CARE EDUCATION/TRAINING PROGRAM

## 2024-07-29 PROCEDURE — G8417 CALC BMI ABV UP PARAM F/U: HCPCS | Performed by: STUDENT IN AN ORGANIZED HEALTH CARE EDUCATION/TRAINING PROGRAM

## 2024-07-29 PROCEDURE — 3078F DIAST BP <80 MM HG: CPT | Performed by: STUDENT IN AN ORGANIZED HEALTH CARE EDUCATION/TRAINING PROGRAM

## 2024-07-29 PROCEDURE — 1036F TOBACCO NON-USER: CPT | Performed by: STUDENT IN AN ORGANIZED HEALTH CARE EDUCATION/TRAINING PROGRAM

## 2024-07-29 PROCEDURE — 83036 HEMOGLOBIN GLYCOSYLATED A1C: CPT | Performed by: STUDENT IN AN ORGANIZED HEALTH CARE EDUCATION/TRAINING PROGRAM

## 2024-07-29 PROCEDURE — 1123F ACP DISCUSS/DSCN MKR DOCD: CPT | Performed by: STUDENT IN AN ORGANIZED HEALTH CARE EDUCATION/TRAINING PROGRAM

## 2024-07-29 RX ORDER — METHOCARBAMOL 750 MG/1
TABLET, FILM COATED ORAL
COMMUNITY
Start: 2024-06-25

## 2024-07-29 ASSESSMENT — ENCOUNTER SYMPTOMS
ABDOMINAL PAIN: 0
SHORTNESS OF BREATH: 0

## 2024-08-01 ENCOUNTER — OFFICE VISIT (OUTPATIENT)
Dept: FAMILY MEDICINE CLINIC | Age: 74
End: 2024-08-01

## 2024-08-01 VITALS
OXYGEN SATURATION: 98 % | BODY MASS INDEX: 29.12 KG/M2 | HEIGHT: 69 IN | HEART RATE: 50 BPM | DIASTOLIC BLOOD PRESSURE: 70 MMHG | WEIGHT: 196.6 LBS | SYSTOLIC BLOOD PRESSURE: 108 MMHG

## 2024-08-01 DIAGNOSIS — R00.1 SINUS BRADYCARDIA: ICD-10-CM

## 2024-08-01 DIAGNOSIS — R39.16 STRAINING TO VOID: ICD-10-CM

## 2024-08-01 DIAGNOSIS — Z76.89 ESTABLISHING CARE WITH NEW DOCTOR, ENCOUNTER FOR: ICD-10-CM

## 2024-08-01 DIAGNOSIS — I25.10 CORONARY ARTERY DISEASE INVOLVING NATIVE CORONARY ARTERY OF NATIVE HEART WITHOUT ANGINA PECTORIS: Primary | ICD-10-CM

## 2024-08-01 DIAGNOSIS — Z01.818 PRE-OP EVALUATION: ICD-10-CM

## 2024-08-01 DIAGNOSIS — R79.9 ABNORMAL FINDING OF BLOOD CHEMISTRY, UNSPECIFIED: ICD-10-CM

## 2024-08-01 DIAGNOSIS — R73.03 PREDIABETES: ICD-10-CM

## 2024-08-01 LAB
BASOPHILS # BLD: 0.1 K/UL (ref 0–0.2)
BASOPHILS NFR BLD: 0.8 %
CREAT UR-MCNC: 112.6 MG/DL (ref 39–259)
DEPRECATED RDW RBC AUTO: 12.5 % (ref 12.4–15.4)
EOSINOPHIL # BLD: 0.2 K/UL (ref 0–0.6)
EOSINOPHIL NFR BLD: 3.6 %
EST. AVERAGE GLUCOSE BLD GHB EST-MCNC: 208.7 MG/DL
HBA1C MFR BLD: 8.9 %
HCT VFR BLD AUTO: 38.3 % (ref 40.5–52.5)
HGB BLD-MCNC: 13.2 G/DL (ref 13.5–17.5)
LYMPHOCYTES # BLD: 2.1 K/UL (ref 1–5.1)
LYMPHOCYTES NFR BLD: 30.7 %
MCH RBC QN AUTO: 31.2 PG (ref 26–34)
MCHC RBC AUTO-ENTMCNC: 34.4 G/DL (ref 31–36)
MCV RBC AUTO: 90.7 FL (ref 80–100)
MICROALBUMIN UR DL<=1MG/L-MCNC: 1.9 MG/DL
MICROALBUMIN/CREAT UR: 16.9 MG/G (ref 0–30)
MONOCYTES # BLD: 0.7 K/UL (ref 0–1.3)
MONOCYTES NFR BLD: 9.6 %
NEUTROPHILS # BLD: 3.8 K/UL (ref 1.7–7.7)
NEUTROPHILS NFR BLD: 55.3 %
PLATELET # BLD AUTO: 172 K/UL (ref 135–450)
PMV BLD AUTO: 8.6 FL (ref 5–10.5)
PSA SERPL DL<=0.01 NG/ML-MCNC: 8.89 NG/ML (ref 0–4)
RBC # BLD AUTO: 4.23 M/UL (ref 4.2–5.9)
TSH SERPL DL<=0.005 MIU/L-ACNC: 2.45 UIU/ML (ref 0.27–4.2)
WBC # BLD AUTO: 6.8 K/UL (ref 4–11)

## 2024-08-01 ASSESSMENT — ENCOUNTER SYMPTOMS
ABDOMINAL PAIN: 0
SHORTNESS OF BREATH: 0

## 2024-08-01 NOTE — PROGRESS NOTES
Luis E Del Valle is a 73 y.o.male who presents with   Chief Complaint   Patient presents with    Pre-op Exam     Cataract removal 8-6-24 Dr. Jacques @ [facility]     Portions of this chart may have been created with voice recognition software. Occasional wrong-word or \"sound-alike\" substitutions may have occurred due to the inherent limitations of voice recognition software. Read the chart carefully and recognize, using context, where these substitutions have occurred      Patient presents for preop visit.  See below    Vitals today:  HR 47  Bp 108/64  EKG performed in office shows sinus bradycardia with first-degree AV block.  Patient has a history of CAD.  Again, currently asymptomatic.  Patient reports that he feels well.  Denies any lightheadedness, dizziness, blurry vision, chest pain or shortness of breath.  Patient states that he got up today and drank a glass of water in that day.  States that normally he drinks much more water because he goes multiple first wakes up.    Patient has not yet completed blood work ordered at the last visit.  He was advised to do so.  Has been told that due to likely from neck surgery he is Difficult to intubate due to possibly neck surgery.          Review of Systems   Constitutional:  Negative for fatigue.   Eyes:  Negative for visual disturbance.   Respiratory:  Negative for shortness of breath.    Cardiovascular:  Negative for chest pain.   Gastrointestinal:  Negative for abdominal pain.   Skin:  Negative for rash.   Neurological:  Negative for dizziness, light-headedness and headaches.        Past Medical History:   Diagnosis Date    Anal fissure 08/2022    Atrial fibrillation (HCC) 11/2021    AV block, 1st degree     CAD (coronary artery disease) 04/2011    Dr. Llanos    Calculus of kidney 12/01/2010    Chondromalacia of patella     Closed nondisplaced fracture of styloid process of left radius 10/2021    Congenital heart disease 2011    DDD (degenerative disc disease), lumbar

## 2024-08-02 LAB
ALBUMIN SERPL-MCNC: 4.4 G/DL (ref 3.4–5)
ALBUMIN/GLOB SERPL: 1.9 {RATIO} (ref 1.1–2.2)
ALP SERPL-CCNC: 77 U/L (ref 40–129)
ALT SERPL-CCNC: 48 U/L (ref 10–40)
ANION GAP SERPL CALCULATED.3IONS-SCNC: 14 MMOL/L (ref 3–16)
AST SERPL-CCNC: 27 U/L (ref 15–37)
BILIRUB SERPL-MCNC: 1 MG/DL (ref 0–1)
BUN SERPL-MCNC: 13 MG/DL (ref 7–20)
CALCIUM SERPL-MCNC: 9.4 MG/DL (ref 8.3–10.6)
CHLORIDE SERPL-SCNC: 99 MMOL/L (ref 99–110)
CHOLEST SERPL-MCNC: 148 MG/DL (ref 0–199)
CO2 SERPL-SCNC: 25 MMOL/L (ref 21–32)
CREAT SERPL-MCNC: 0.8 MG/DL (ref 0.8–1.3)
GFR SERPLBLD CREATININE-BSD FMLA CKD-EPI: >90 ML/MIN/{1.73_M2}
GLUCOSE SERPL-MCNC: 210 MG/DL (ref 70–99)
HDLC SERPL-MCNC: 45 MG/DL (ref 40–60)
LDLC SERPL CALC-MCNC: 89 MG/DL
POTASSIUM SERPL-SCNC: 4.7 MMOL/L (ref 3.5–5.1)
PROT SERPL-MCNC: 6.7 G/DL (ref 6.4–8.2)
SODIUM SERPL-SCNC: 138 MMOL/L (ref 136–145)
TRIGL SERPL-MCNC: 71 MG/DL (ref 0–150)
VLDLC SERPL CALC-MCNC: 14 MG/DL

## 2024-08-08 ENCOUNTER — OFFICE VISIT (OUTPATIENT)
Dept: FAMILY MEDICINE CLINIC | Age: 74
End: 2024-08-08

## 2024-08-08 VITALS
WEIGHT: 191 LBS | TEMPERATURE: 96.9 F | DIASTOLIC BLOOD PRESSURE: 70 MMHG | OXYGEN SATURATION: 98 % | SYSTOLIC BLOOD PRESSURE: 118 MMHG | HEART RATE: 57 BPM | BODY MASS INDEX: 28.21 KG/M2

## 2024-08-08 DIAGNOSIS — L25.9 CONTACT DERMATITIS, UNSPECIFIED CONTACT DERMATITIS TYPE, UNSPECIFIED TRIGGER: Primary | ICD-10-CM

## 2024-08-08 PROBLEM — I47.29 NSVT (NONSUSTAINED VENTRICULAR TACHYCARDIA) (HCC): Status: RESOLVED | Noted: 2023-09-11 | Resolved: 2024-08-08

## 2024-08-08 PROBLEM — I48.91 AFIB (HCC): Status: RESOLVED | Noted: 2021-12-17 | Resolved: 2024-08-08

## 2024-08-08 PROBLEM — I48.3 TYPICAL ATRIAL FLUTTER (HCC): Status: RESOLVED | Noted: 2021-12-16 | Resolved: 2024-08-08

## 2024-08-08 RX ORDER — PREDNISONE 20 MG/1
20 TABLET ORAL DAILY
Qty: 10 TABLET | Refills: 0 | Status: SHIPPED | OUTPATIENT
Start: 2024-08-08 | End: 2024-08-18

## 2024-08-08 ASSESSMENT — ENCOUNTER SYMPTOMS
RESPIRATORY NEGATIVE: 1
GASTROINTESTINAL NEGATIVE: 1
EYES NEGATIVE: 1

## 2024-08-08 NOTE — PROGRESS NOTES
Luis E Del Valle (:  1950) is a 73 y.o. male,Established patient, here for evaluation of the following chief complaint(s):  Facial Swelling (X 1 week) and Blister (X 1 week )      ASSESSMENT/PLAN:  1. Contact dermatitis, unspecified contact dermatitis type, unspecified trigger  Assessment & Plan:  Acute - new problem; 10-day course of steroids for inflammation  Educated that warm showers or heat can irritate dermatitis  Follow-up if symptoms do not resolve or worsen  Orders:  -     predniSONE (DELTASONE) 20 MG tablet; Take 1 tablet by mouth daily for 10 days, Disp-10 tablet, R-0Normal    No follow-ups on file.    SUBJECTIVE/OBJECTIVE:  Elan is a 73-year-old  male presenting to the office today for 1 week of facial redness and swelling and scattered macular lesions on bilateral arms and legs. He reports recently having poison ivy that was treated with steroids and a topical. His facial swelling and redness has improved but he continues to have new lesions on his arms and legs.      Current Outpatient Medications   Medication Sig Dispense Refill    predniSONE (DELTASONE) 20 MG tablet Take 1 tablet by mouth daily for 10 days 10 tablet 0    methocarbamol (ROBAXIN) 750 MG tablet METHOCARBAMOL 750 MG TABS      metoprolol succinate (TOPROL XL) 25 MG extended release tablet TAKE 1 TABLET BY MOUTH DAILY 90 tablet 1    lisinopril (PRINIVIL;ZESTRIL) 10 MG tablet TAKE 1 TABLET BY MOUTH IN THE MORNING 90 tablet 0    atorvastatin (LIPITOR) 40 MG tablet TAKE 1 TABLET BY MOUTH DAILY 90 tablet 1    sertraline (ZOLOFT) 25 MG tablet Take 1 tablet by mouth daily      fluconazole (DIFLUCAN) 200 MG tablet TAKE 1 TABLET BY MOUTH EVERY WEEK      blood glucose monitor strips Test 1 times a day & as needed for symptoms of fluctuating blood glucose. Dispense sufficient amount . 100 strip 5    Lancets MISC Test 1 times a day & as needed for symptoms of fluctuating blood glucose. Dispense sufficient amount . 100 each 5    blood

## 2024-10-05 DIAGNOSIS — I10 ESSENTIAL HYPERTENSION: ICD-10-CM

## 2024-10-07 RX ORDER — LISINOPRIL 10 MG/1
10 TABLET ORAL DAILY
Qty: 90 TABLET | Refills: 0 | Status: SHIPPED | OUTPATIENT
Start: 2024-10-07

## 2024-10-28 SDOH — HEALTH STABILITY: PHYSICAL HEALTH: ON AVERAGE, HOW MANY MINUTES DO YOU ENGAGE IN EXERCISE AT THIS LEVEL?: 130 MIN

## 2024-10-28 SDOH — HEALTH STABILITY: PHYSICAL HEALTH: ON AVERAGE, HOW MANY DAYS PER WEEK DO YOU ENGAGE IN MODERATE TO STRENUOUS EXERCISE (LIKE A BRISK WALK)?: 6 DAYS

## 2024-10-28 ASSESSMENT — LIFESTYLE VARIABLES
HOW OFTEN DURING THE LAST YEAR HAVE YOU FOUND THAT YOU WERE NOT ABLE TO STOP DRINKING ONCE YOU HAD STARTED: NEVER
HOW OFTEN DO YOU HAVE SIX OR MORE DRINKS ON ONE OCCASION: 3
HOW OFTEN DURING THE LAST YEAR HAVE YOU FOUND THAT YOU WERE NOT ABLE TO STOP DRINKING ONCE YOU HAD STARTED: NEVER
HOW MANY STANDARD DRINKS CONTAINING ALCOHOL DO YOU HAVE ON A TYPICAL DAY: 1 OR 2
HOW OFTEN DURING THE LAST YEAR HAVE YOU BEEN UNABLE TO REMEMBER WHAT HAPPENED THE NIGHT BEFORE BECAUSE YOU HAD BEEN DRINKING: NEVER
HOW OFTEN DO YOU HAVE A DRINK CONTAINING ALCOHOL: 5
HOW OFTEN DO YOU HAVE A DRINK CONTAINING ALCOHOL: 4 OR MORE TIMES A WEEK
HOW OFTEN DURING THE LAST YEAR HAVE YOU HAD A FEELING OF GUILT OR REMORSE AFTER DRINKING: NEVER
HAS A RELATIVE, FRIEND, DOCTOR, OR ANOTHER HEALTH PROFESSIONAL EXPRESSED CONCERN ABOUT YOUR DRINKING OR SUGGESTED YOU CUT DOWN: YES, DURING THE PAST YEAR
HOW OFTEN DURING THE LAST YEAR HAVE YOU FAILED TO DO WHAT WAS NORMALLY EXPECTED FROM YOU BECAUSE OF DRINKING: NEVER
HOW OFTEN DURING THE LAST YEAR HAVE YOU NEEDED AN ALCOHOLIC DRINK FIRST THING IN THE MORNING TO GET YOURSELF GOING AFTER A NIGHT OF HEAVY DRINKING: NEVER
HOW MANY STANDARD DRINKS CONTAINING ALCOHOL DO YOU HAVE ON A TYPICAL DAY: 1
HOW OFTEN DURING THE LAST YEAR HAVE YOU NEEDED AN ALCOHOLIC DRINK FIRST THING IN THE MORNING TO GET YOURSELF GOING AFTER A NIGHT OF HEAVY DRINKING: NEVER
HOW OFTEN DURING THE LAST YEAR HAVE YOU FAILED TO DO WHAT WAS NORMALLY EXPECTED FROM YOU BECAUSE OF DRINKING: NEVER
HAVE YOU OR SOMEONE ELSE BEEN INJURED AS A RESULT OF YOUR DRINKING: NO
HOW OFTEN DURING THE LAST YEAR HAVE YOU HAD A FEELING OF GUILT OR REMORSE AFTER DRINKING: NEVER
HOW OFTEN DURING THE LAST YEAR HAVE YOU BEEN UNABLE TO REMEMBER WHAT HAPPENED THE NIGHT BEFORE BECAUSE YOU HAD BEEN DRINKING: NEVER
HAS A RELATIVE, FRIEND, DOCTOR, OR ANOTHER HEALTH PROFESSIONAL EXPRESSED CONCERN ABOUT YOUR DRINKING OR SUGGESTED YOU CUT DOWN: YES, DURING THE PAST YEAR
HAVE YOU OR SOMEONE ELSE BEEN INJURED AS A RESULT OF YOUR DRINKING: NO

## 2024-10-28 ASSESSMENT — PATIENT HEALTH QUESTIONNAIRE - PHQ9
2. FEELING DOWN, DEPRESSED OR HOPELESS: NOT AT ALL
SUM OF ALL RESPONSES TO PHQ QUESTIONS 1-9: 0
1. LITTLE INTEREST OR PLEASURE IN DOING THINGS: NOT AT ALL
SUM OF ALL RESPONSES TO PHQ QUESTIONS 1-9: 0
SUM OF ALL RESPONSES TO PHQ9 QUESTIONS 1 & 2: 0

## 2024-10-30 ENCOUNTER — OFFICE VISIT (OUTPATIENT)
Dept: FAMILY MEDICINE CLINIC | Age: 74
End: 2024-10-30

## 2024-10-30 VITALS
WEIGHT: 191.8 LBS | HEIGHT: 70 IN | DIASTOLIC BLOOD PRESSURE: 66 MMHG | HEART RATE: 50 BPM | SYSTOLIC BLOOD PRESSURE: 112 MMHG | BODY MASS INDEX: 27.46 KG/M2 | OXYGEN SATURATION: 95 %

## 2024-10-30 DIAGNOSIS — E11.69 TYPE 2 DIABETES MELLITUS WITH HYPERLIPIDEMIA (HCC): ICD-10-CM

## 2024-10-30 DIAGNOSIS — Z00.00 MEDICARE ANNUAL WELLNESS VISIT, SUBSEQUENT: Primary | ICD-10-CM

## 2024-10-30 DIAGNOSIS — E78.5 TYPE 2 DIABETES MELLITUS WITH HYPERLIPIDEMIA (HCC): ICD-10-CM

## 2024-10-30 LAB — HBA1C MFR BLD: 8.4 %

## 2024-10-30 RX ORDER — SERTRALINE HYDROCHLORIDE 25 MG/1
25 TABLET, FILM COATED ORAL DAILY
Qty: 30 TABLET | Refills: 0 | Status: SHIPPED | OUTPATIENT
Start: 2024-10-30

## 2024-10-30 ASSESSMENT — PATIENT HEALTH QUESTIONNAIRE - PHQ9
9. THOUGHTS THAT YOU WOULD BE BETTER OFF DEAD, OR OF HURTING YOURSELF: NOT AT ALL
SUM OF ALL RESPONSES TO PHQ QUESTIONS 1-9: 1
SUM OF ALL RESPONSES TO PHQ QUESTIONS 1-9: 1
7. TROUBLE CONCENTRATING ON THINGS, SUCH AS READING THE NEWSPAPER OR WATCHING TELEVISION: NOT AT ALL
1. LITTLE INTEREST OR PLEASURE IN DOING THINGS: NOT AT ALL
8. MOVING OR SPEAKING SO SLOWLY THAT OTHER PEOPLE COULD HAVE NOTICED. OR THE OPPOSITE, BEING SO FIGETY OR RESTLESS THAT YOU HAVE BEEN MOVING AROUND A LOT MORE THAN USUAL: SEVERAL DAYS
3. TROUBLE FALLING OR STAYING ASLEEP: NOT AT ALL
SUM OF ALL RESPONSES TO PHQ QUESTIONS 1-9: 1
10. IF YOU CHECKED OFF ANY PROBLEMS, HOW DIFFICULT HAVE THESE PROBLEMS MADE IT FOR YOU TO DO YOUR WORK, TAKE CARE OF THINGS AT HOME, OR GET ALONG WITH OTHER PEOPLE: NOT DIFFICULT AT ALL
SUM OF ALL RESPONSES TO PHQ9 QUESTIONS 1 & 2: 0
SUM OF ALL RESPONSES TO PHQ QUESTIONS 1-9: 1
4. FEELING TIRED OR HAVING LITTLE ENERGY: NOT AT ALL
6. FEELING BAD ABOUT YOURSELF - OR THAT YOU ARE A FAILURE OR HAVE LET YOURSELF OR YOUR FAMILY DOWN: NOT AT ALL
2. FEELING DOWN, DEPRESSED OR HOPELESS: NOT AT ALL
5. POOR APPETITE OR OVEREATING: NOT AT ALL

## 2024-10-30 ASSESSMENT — ENCOUNTER SYMPTOMS
SHORTNESS OF BREATH: 0
ABDOMINAL PAIN: 0

## 2024-10-30 NOTE — PROGRESS NOTES
Food Insecurity (4/9/2024)    Hunger Vital Sign     Worried About Running Out of Food in the Last Year: Never true     Ran Out of Food in the Last Year: Never true   Transportation Needs: Unknown (4/9/2024)    PRAPARE - Transportation     Lack of Transportation (Medical): Not on file     Lack of Transportation (Non-Medical): No   Physical Activity: Sufficiently Active (10/28/2024)    Exercise Vital Sign     Days of Exercise per Week: 6 days     Minutes of Exercise per Session: 130 min   Stress: Not on file   Social Connections: Not on file   Intimate Partner Violence: Unknown (1/21/2024)    [facility] from [facility] and [facility]    Interpersonal Safety     Feel physically or emotionally unsafe where currently live: Not on file     Harm by anyone: Not on file     Emotionally Harmed: Not on file   Housing Stability: Unknown (4/9/2024)    Housing Stability Vital Sign     Unable to Pay for Housing in the Last Year: Not on file     Number of Places Lived in the Last Year: Not on file     Unstable Housing in the Last Year: No       Current Outpatient Medications on File Prior to Visit   Medication Sig Dispense Refill    lisinopril (PRINIVIL;ZESTRIL) 10 MG tablet TAKE 1 TABLET BY MOUTH IN THE MORNING 90 tablet 0    methocarbamol (ROBAXIN) 750 MG tablet METHOCARBAMOL 750 MG TABS      metoprolol succinate (TOPROL XL) 25 MG extended release tablet TAKE 1 TABLET BY MOUTH DAILY 90 tablet 1    atorvastatin (LIPITOR) 40 MG tablet TAKE 1 TABLET BY MOUTH DAILY 90 tablet 1    fluconazole (DIFLUCAN) 200 MG tablet TAKE 1 TABLET BY MOUTH EVERY WEEK      blood glucose monitor strips Test 1 times a day & as needed for symptoms of fluctuating blood glucose. Dispense sufficient amount . 100 strip 5    Lancets MISC Test 1 times a day & as needed for symptoms of fluctuating blood glucose. Dispense sufficient amount . 100 each 5    blood glucose monitor kit and supplies Check Blood sugars and prn symptoms of fluctuating Blood

## 2024-10-31 NOTE — PATIENT INSTRUCTIONS
9 Ways to Cut Back on Drinking  Maybe you've found yourself drinking more alcohol than you'd prefer. If you want to cut back, here are some ideas to try.    Think before you drink.  Do you really want a drink, or is it just a habit? If you're used to having a drink at a certain time, try doing something else then.     Look for substitutes.  Find some no-alcohol drinks that you enjoy, like flavored seltzer water, tea with honey, or tonic with a slice of lime. Or try alcohol-free beer or \"virgin\" cocktails (without the alcohol).     Drink more water.  Use water to quench your thirst. Drink a glass of water before you have any alcohol. Have another glass along with every drink or between drinks.     Shrink your drink.  For example, have a bottle of beer instead of a pint. Use a smaller glass for wine. Choose drinks with lower alcohol content (ABV%). Or use less liquor and more mixer in cocktails.     Slow down.  It's easy to drink quickly and without thinking about it. Pay attention, and make each drink last longer.     Do the math.  Total up how much you spend on alcohol each month. How much is that a year? If you cut back, what could you do with the money you save?     Take a break.  Choose a day or two each week when you won't drink at all. Notice how you feel on those days, physically and emotionally. How did you sleep? Do you feel better? Over time, add more break days.     Count calories.  Would you like to lose some weight? For some people that's a good motivator for cutting back. Figure out how many calories are in each drink. How many does that add up to in a day? In a week? In a month?     Practice saying no.  Be ready when someone offers you a drink. Try: \"Thanks, I've had enough.\" Or \"Thanks, but I'm cutting back.\" Or \"No, thanks. I feel better when I drink less.\"   Current as of: November 15, 2023  Content Version: 14.2  © 2024 [employer].   Care instructions adapted under license by [facility]

## 2024-11-19 ENCOUNTER — OFFICE VISIT (OUTPATIENT)
Dept: FAMILY MEDICINE CLINIC | Age: 74
End: 2024-11-19
Payer: MEDICARE

## 2024-11-19 VITALS
WEIGHT: 196 LBS | HEIGHT: 70 IN | HEART RATE: 51 BPM | TEMPERATURE: 98.5 F | DIASTOLIC BLOOD PRESSURE: 72 MMHG | SYSTOLIC BLOOD PRESSURE: 122 MMHG | OXYGEN SATURATION: 99 % | BODY MASS INDEX: 28.06 KG/M2

## 2024-11-19 DIAGNOSIS — B02.9 HERPES ZOSTER WITHOUT COMPLICATION: Primary | ICD-10-CM

## 2024-11-19 PROCEDURE — 3078F DIAST BP <80 MM HG: CPT | Performed by: NURSE PRACTITIONER

## 2024-11-19 PROCEDURE — 1159F MED LIST DOCD IN RCRD: CPT | Performed by: NURSE PRACTITIONER

## 2024-11-19 PROCEDURE — 99213 OFFICE O/P EST LOW 20 MIN: CPT | Performed by: NURSE PRACTITIONER

## 2024-11-19 PROCEDURE — G8427 DOCREV CUR MEDS BY ELIG CLIN: HCPCS | Performed by: NURSE PRACTITIONER

## 2024-11-19 PROCEDURE — 1123F ACP DISCUSS/DSCN MKR DOCD: CPT | Performed by: NURSE PRACTITIONER

## 2024-11-19 PROCEDURE — 1036F TOBACCO NON-USER: CPT | Performed by: NURSE PRACTITIONER

## 2024-11-19 PROCEDURE — 3074F SYST BP LT 130 MM HG: CPT | Performed by: NURSE PRACTITIONER

## 2024-11-19 PROCEDURE — G8484 FLU IMMUNIZE NO ADMIN: HCPCS | Performed by: NURSE PRACTITIONER

## 2024-11-19 PROCEDURE — 3017F COLORECTAL CA SCREEN DOC REV: CPT | Performed by: NURSE PRACTITIONER

## 2024-11-19 PROCEDURE — G8417 CALC BMI ABV UP PARAM F/U: HCPCS | Performed by: NURSE PRACTITIONER

## 2024-11-19 RX ORDER — LIDOCAINE 50 MG/G
1 PATCH TOPICAL DAILY
Qty: 30 PATCH | Refills: 0 | Status: SHIPPED | OUTPATIENT
Start: 2024-11-19

## 2024-11-19 ASSESSMENT — ENCOUNTER SYMPTOMS
WHEEZING: 0
SHORTNESS OF BREATH: 0
BACK PAIN: 0
SINUS PAIN: 0
CONSTIPATION: 0
DIARRHEA: 0
COLOR CHANGE: 0
SINUS PRESSURE: 0
ABDOMINAL PAIN: 0
COUGH: 0

## 2024-11-19 NOTE — PROGRESS NOTES
Luis E Del Valle (:  1950) is a 74 y.o. male,Established patient, here for evaluation of the following chief complaint(s):  Skin Problem (Rash on back, present for ~2 weeks)         Assessment & Plan  Herpes zoster without complication  Started 9 days ago so starting antivirals at this point would likely not be helpful.  Continue Tylenol as needed for pain.  Advised loosefitting clothing.  Can use OTC anti-itch cream and lidocaine as needed.  He is up-to-date on shingles vaccine    Orders:  •  lidocaine (LIDODERM) 5 %; Place 1 patch onto the skin daily 12 hours on, 12 hours off.      No follow-ups on file.         Subjective   HPI   Patient here today for rash on his right mid back that started about 9 days ago.  This rash is painful to touch and intermittently throughout the day. He states it is itchy and he has tried an anti-itch cream which does help some.  He recently switched detergents and was not sure if that is the cause.  He has also been more stressed as his brother is dealing with a new diagnosis of early onset Alzheimer's.         Allergies   Allergen Reactions   • Amoxicillin Hives and Swelling       Current Outpatient Medications   Medication Sig Dispense Refill   • lidocaine (LIDODERM) 5 % Place 1 patch onto the skin daily 12 hours on, 12 hours off. 30 patch 0   • sertraline (ZOLOFT) 25 MG tablet Take 1 tablet by mouth daily 30 tablet 0   • diclofenac sodium (VOLTAREN) 1 % GEL Apply 4 g topically 4 times daily 50 g 0   • lisinopril (PRINIVIL;ZESTRIL) 10 MG tablet TAKE 1 TABLET BY MOUTH IN THE MORNING 90 tablet 0   • methocarbamol (ROBAXIN) 750 MG tablet METHOCARBAMOL 750 MG TABS     • metoprolol succinate (TOPROL XL) 25 MG extended release tablet TAKE 1 TABLET BY MOUTH DAILY 90 tablet 1   • atorvastatin (LIPITOR) 40 MG tablet TAKE 1 TABLET BY MOUTH DAILY 90 tablet 1   • fluconazole (DIFLUCAN) 200 MG tablet TAKE 1 TABLET BY MOUTH EVERY WEEK     • blood glucose monitor strips Test 1 times a

## 2024-11-19 NOTE — ASSESSMENT & PLAN NOTE
Started 9 days ago so starting antivirals at this point would likely not be helpful.  Continue Tylenol as needed for pain.  Advised loosefitting clothing.  Can use OTC anti-itch cream and lidocaine as needed.  He is up-to-date on shingles vaccine    Orders:    lidocaine (LIDODERM) 5 %; Place 1 patch onto the skin daily 12 hours on, 12 hours off.

## 2024-11-21 ENCOUNTER — TELEPHONE (OUTPATIENT)
Dept: ADMINISTRATIVE | Age: 74
End: 2024-11-21

## 2024-11-21 NOTE — TELEPHONE ENCOUNTER
Submitted PA for Lidocaine 5% patches  Via [facility] Key: [id number]  STATUS: PENDING.    Follow up done daily; if no decision with in three days we will refax.  If another three days goes by with no decision will call the insurance for status.

## 2024-12-02 RX ORDER — SERTRALINE HYDROCHLORIDE 25 MG/1
25 TABLET, FILM COATED ORAL DAILY
Qty: 30 TABLET | Refills: 0 | Status: SHIPPED | OUTPATIENT
Start: 2024-12-02

## 2024-12-19 DIAGNOSIS — E78.00 PURE HYPERCHOLESTEROLEMIA: ICD-10-CM

## 2024-12-19 RX ORDER — ATORVASTATIN CALCIUM 40 MG/1
40 TABLET, FILM COATED ORAL DAILY
Qty: 90 TABLET | Refills: 1 | Status: SHIPPED | OUTPATIENT
Start: 2024-12-19

## 2024-12-19 NOTE — TELEPHONE ENCOUNTER
Medication:   Requested Prescriptions     Pending Prescriptions Disp Refills    atorvastatin (LIPITOR) 40 MG tablet [Pharmacy Med Name: ATORVASTATIN 40MG TABLETS] 90 tablet 1     Sig: TAKE 1 TABLET BY MOUTH DAILY     Last Filled:      Last appt: 11/19/2024   Next appt: Visit date not found    Last OARRS:       2/4/2020     9:08 PM   RX Monitoring   Periodic Controlled Substance Monitoring Possible medication side effects, risk of tolerance/dependence & alternative treatments discussed.;No signs of potential drug abuse or diversion identified.

## 2024-12-20 DIAGNOSIS — R09.A2 GLOBUS PHARYNGEUS: ICD-10-CM

## 2024-12-20 DIAGNOSIS — K21.9 LARYNGOPHARYNGEAL REFLUX (LPR): ICD-10-CM

## 2024-12-23 RX ORDER — PANTOPRAZOLE SODIUM 40 MG/1
40 TABLET, DELAYED RELEASE ORAL
Qty: 90 TABLET | Refills: 3 | Status: SHIPPED | OUTPATIENT
Start: 2024-12-23 | End: 2025-03-23

## 2024-12-26 DIAGNOSIS — I10 ESSENTIAL HYPERTENSION: ICD-10-CM

## 2024-12-26 RX ORDER — METOPROLOL SUCCINATE 25 MG/1
25 TABLET, EXTENDED RELEASE ORAL DAILY
Qty: 90 TABLET | Refills: 1 | OUTPATIENT
Start: 2024-12-26

## 2025-01-06 DIAGNOSIS — I10 ESSENTIAL HYPERTENSION: ICD-10-CM

## 2025-01-07 RX ORDER — LISINOPRIL 10 MG/1
10 TABLET ORAL DAILY
Qty: 90 TABLET | Refills: 0 | Status: SHIPPED | OUTPATIENT
Start: 2025-01-07

## 2025-01-08 RX ORDER — SERTRALINE HYDROCHLORIDE 25 MG/1
25 TABLET, FILM COATED ORAL DAILY
Qty: 30 TABLET | Refills: 0 | Status: SHIPPED | OUTPATIENT
Start: 2025-01-08

## 2025-02-03 RX ORDER — SERTRALINE HYDROCHLORIDE 25 MG/1
25 TABLET, FILM COATED ORAL DAILY
Qty: 30 TABLET | Refills: 0 | Status: SHIPPED | OUTPATIENT
Start: 2025-02-03

## 2025-02-03 RX ORDER — SERTRALINE HYDROCHLORIDE 25 MG/1
25 TABLET, FILM COATED ORAL DAILY
Qty: 30 TABLET | Refills: 0 | OUTPATIENT
Start: 2025-02-03

## 2025-03-12 DIAGNOSIS — I10 ESSENTIAL HYPERTENSION: ICD-10-CM

## 2025-03-12 DIAGNOSIS — E11.9 TYPE 2 DIABETES MELLITUS WITHOUT COMPLICATION, WITHOUT LONG-TERM CURRENT USE OF INSULIN (HCC): ICD-10-CM

## 2025-03-13 RX ORDER — AVOBENZONE, HOMOSALATE, OCTISALATE, OCTOCRYLENE 30; 40; 45; 26 MG/ML; MG/ML; MG/ML; MG/ML
CREAM TOPICAL
Qty: 100 EACH | Refills: 5 | OUTPATIENT
Start: 2025-03-13

## 2025-03-13 RX ORDER — METOPROLOL SUCCINATE 25 MG/1
25 TABLET, EXTENDED RELEASE ORAL DAILY
Qty: 90 TABLET | Refills: 1 | OUTPATIENT
Start: 2025-03-13

## 2025-03-13 RX ORDER — GLUCOSAMINE HCL/CHONDROITIN SU 500-400 MG
CAPSULE ORAL
Qty: 100 STRIP | Refills: 5 | OUTPATIENT
Start: 2025-03-13

## 2025-03-13 RX ORDER — SERTRALINE HYDROCHLORIDE 25 MG/1
25 TABLET, FILM COATED ORAL DAILY
Qty: 30 TABLET | Refills: 0 | OUTPATIENT
Start: 2025-03-13

## 2025-04-07 DIAGNOSIS — I10 ESSENTIAL HYPERTENSION: ICD-10-CM

## 2025-04-07 DIAGNOSIS — E11.9 TYPE 2 DIABETES MELLITUS WITHOUT COMPLICATION, WITHOUT LONG-TERM CURRENT USE OF INSULIN (HCC): ICD-10-CM

## 2025-04-07 RX ORDER — SERTRALINE HYDROCHLORIDE 25 MG/1
25 TABLET, FILM COATED ORAL DAILY
Qty: 30 TABLET | Refills: 0 | Status: CANCELLED | OUTPATIENT
Start: 2025-04-07

## 2025-04-07 RX ORDER — GLUCOSAMINE HCL/CHONDROITIN SU 500-400 MG
CAPSULE ORAL
Qty: 100 STRIP | Refills: 5 | OUTPATIENT
Start: 2025-04-07

## 2025-04-07 RX ORDER — METOPROLOL SUCCINATE 25 MG/1
25 TABLET, EXTENDED RELEASE ORAL DAILY
Qty: 90 TABLET | Refills: 0 | Status: CANCELLED | OUTPATIENT
Start: 2025-04-07

## 2025-04-07 NOTE — TELEPHONE ENCOUNTER
MIGUEL ÁNGEL 10/30/24 Maxim STORY not scheduled. Left My Chart message and scheduling ticket letting know to schedule follow up appointment

## 2025-04-08 ENCOUNTER — OFFICE VISIT (OUTPATIENT)
Dept: FAMILY MEDICINE CLINIC | Age: 75
End: 2025-04-08

## 2025-04-08 VITALS
WEIGHT: 187.4 LBS | SYSTOLIC BLOOD PRESSURE: 138 MMHG | OXYGEN SATURATION: 100 % | BODY MASS INDEX: 27.28 KG/M2 | DIASTOLIC BLOOD PRESSURE: 86 MMHG | HEART RATE: 58 BPM

## 2025-04-08 DIAGNOSIS — I10 ESSENTIAL HYPERTENSION: ICD-10-CM

## 2025-04-08 DIAGNOSIS — E11.9 TYPE 2 DIABETES MELLITUS WITHOUT COMPLICATION, WITHOUT LONG-TERM CURRENT USE OF INSULIN: ICD-10-CM

## 2025-04-08 DIAGNOSIS — E11.69 TYPE 2 DIABETES MELLITUS WITH HYPERLIPIDEMIA (HCC): ICD-10-CM

## 2025-04-08 DIAGNOSIS — J20.9 ACUTE BRONCHITIS, UNSPECIFIED ORGANISM: Primary | ICD-10-CM

## 2025-04-08 DIAGNOSIS — L98.9 SCALP LESION: ICD-10-CM

## 2025-04-08 DIAGNOSIS — E78.5 TYPE 2 DIABETES MELLITUS WITH HYPERLIPIDEMIA (HCC): ICD-10-CM

## 2025-04-08 LAB — HBA1C MFR BLD: 6.8 %

## 2025-04-08 RX ORDER — METOPROLOL SUCCINATE 25 MG/1
25 TABLET, EXTENDED RELEASE ORAL DAILY
Qty: 90 TABLET | Refills: 1 | Status: SHIPPED | OUTPATIENT
Start: 2025-04-08

## 2025-04-08 RX ORDER — SERTRALINE HYDROCHLORIDE 25 MG/1
25 TABLET, FILM COATED ORAL DAILY
Qty: 30 TABLET | Refills: 0 | Status: SHIPPED | OUTPATIENT
Start: 2025-04-08

## 2025-04-08 RX ORDER — AZITHROMYCIN 250 MG/1
TABLET, FILM COATED ORAL
Qty: 6 TABLET | Refills: 0 | Status: SHIPPED | OUTPATIENT
Start: 2025-04-08 | End: 2025-04-18

## 2025-04-08 RX ORDER — GLUCOSAMINE HCL/CHONDROITIN SU 500-400 MG
CAPSULE ORAL
Qty: 100 STRIP | Refills: 5 | Status: SHIPPED | OUTPATIENT
Start: 2025-04-08

## 2025-04-08 ASSESSMENT — PATIENT HEALTH QUESTIONNAIRE - PHQ9
3. TROUBLE FALLING OR STAYING ASLEEP: NOT AT ALL
8. MOVING OR SPEAKING SO SLOWLY THAT OTHER PEOPLE COULD HAVE NOTICED. OR THE OPPOSITE, BEING SO FIGETY OR RESTLESS THAT YOU HAVE BEEN MOVING AROUND A LOT MORE THAN USUAL: NOT AT ALL
4. FEELING TIRED OR HAVING LITTLE ENERGY: SEVERAL DAYS
6. FEELING BAD ABOUT YOURSELF - OR THAT YOU ARE A FAILURE OR HAVE LET YOURSELF OR YOUR FAMILY DOWN: NOT AT ALL
2. FEELING DOWN, DEPRESSED OR HOPELESS: NOT AT ALL
9. THOUGHTS THAT YOU WOULD BE BETTER OFF DEAD, OR OF HURTING YOURSELF: NOT AT ALL
SUM OF ALL RESPONSES TO PHQ QUESTIONS 1-9: 1
SUM OF ALL RESPONSES TO PHQ QUESTIONS 1-9: 1
5. POOR APPETITE OR OVEREATING: NOT AT ALL
10. IF YOU CHECKED OFF ANY PROBLEMS, HOW DIFFICULT HAVE THESE PROBLEMS MADE IT FOR YOU TO DO YOUR WORK, TAKE CARE OF THINGS AT HOME, OR GET ALONG WITH OTHER PEOPLE: NOT DIFFICULT AT ALL
1. LITTLE INTEREST OR PLEASURE IN DOING THINGS: NOT AT ALL
SUM OF ALL RESPONSES TO PHQ QUESTIONS 1-9: 1
SUM OF ALL RESPONSES TO PHQ QUESTIONS 1-9: 1
7. TROUBLE CONCENTRATING ON THINGS, SUCH AS READING THE NEWSPAPER OR WATCHING TELEVISION: NOT AT ALL

## 2025-04-08 NOTE — PROGRESS NOTES
Luis E Del Valle is a 74 y.o.male who presents with   Chief Complaint   Patient presents with    Medication Refill    Congestion    Cough     Symptoms have been ongoing for about a week     History of Present Illness  The patient presents for evaluation of cough, diabetes mellitus, and stress.    A persistent cough characterized by the production of thick, yellow-green mucus is reported. This symptom was preceded by nasal congestion, which has since resolved. A mild fever of 99 degrees was experienced 3 days ago, and new-onset dental pain is noted. Recent travel to [place], including exposure to large crowds at a basketball game and an airport, is recalled. Symptoms typically manifest during seasonal transitions. Atrovent nasal spray has been used for the past few days, providing some relief. No sinus or throat pain is reported, but the voice is described as raspy. Mucinex has not been used, and frequent nose blowing is noted.    An A1c level of 6.8 is reported. A weight loss of over 10 pounds since November 2024, from 196 to 187, is noted. Walking 5 miles daily, regardless of the weather, has been the primary form of exercise, although walking has ceased since returning from [place]. No weight gain is reported, and less food intake is noted. Glucose levels were 102 last night, 111 one day while on vacation, and 89 the next day. Typically, glucose levels range from 145 to 165 earlier in the day and lower at night. No medication is taken for this condition. Diet is monitored closely, and glucose levels are checked more frequently. Running has been reduced, and walking or working out with a  once a week has been started.    Stress is managed well, and no depression is reported. Upset feelings about global issues are expressed, with solace found in writing letters or posting on Facebook. Exercise is believed to help manage stress. Fishing in the spring is enjoyed, which helps take the mind off things.    A lesion

## 2025-04-11 DIAGNOSIS — I10 ESSENTIAL HYPERTENSION: ICD-10-CM

## 2025-04-14 RX ORDER — LISINOPRIL 10 MG/1
10 TABLET ORAL DAILY
Qty: 90 TABLET | Refills: 0 | Status: SHIPPED | OUTPATIENT
Start: 2025-04-14

## 2025-05-07 ENCOUNTER — OFFICE VISIT (OUTPATIENT)
Dept: FAMILY MEDICINE CLINIC | Age: 75
End: 2025-05-07

## 2025-05-07 VITALS
HEART RATE: 49 BPM | DIASTOLIC BLOOD PRESSURE: 68 MMHG | OXYGEN SATURATION: 100 % | BODY MASS INDEX: 27.8 KG/M2 | WEIGHT: 191 LBS | SYSTOLIC BLOOD PRESSURE: 116 MMHG

## 2025-05-07 DIAGNOSIS — J06.9 VIRAL URI: ICD-10-CM

## 2025-05-07 DIAGNOSIS — R20.0 FINGER NUMBNESS: Primary | ICD-10-CM

## 2025-05-07 DIAGNOSIS — M62.838 MUSCLE SPASM: ICD-10-CM

## 2025-05-07 DIAGNOSIS — R00.1 SINUS BRADYCARDIA: ICD-10-CM

## 2025-05-07 RX ORDER — SERTRALINE HYDROCHLORIDE 25 MG/1
25 TABLET, FILM COATED ORAL DAILY
Qty: 30 TABLET | Refills: 0 | Status: SHIPPED | OUTPATIENT
Start: 2025-05-07

## 2025-05-07 SDOH — ECONOMIC STABILITY: FOOD INSECURITY: WITHIN THE PAST 12 MONTHS, THE FOOD YOU BOUGHT JUST DIDN'T LAST AND YOU DIDN'T HAVE MONEY TO GET MORE.: NEVER TRUE

## 2025-05-07 SDOH — ECONOMIC STABILITY: FOOD INSECURITY: WITHIN THE PAST 12 MONTHS, YOU WORRIED THAT YOUR FOOD WOULD RUN OUT BEFORE YOU GOT MONEY TO BUY MORE.: NEVER TRUE

## 2025-05-07 SDOH — ECONOMIC STABILITY: INCOME INSECURITY: IN THE LAST 12 MONTHS, WAS THERE A TIME WHEN YOU WERE NOT ABLE TO PAY THE MORTGAGE OR RENT ON TIME?: NO

## 2025-05-07 SDOH — ECONOMIC STABILITY: TRANSPORTATION INSECURITY
IN THE PAST 12 MONTHS, HAS THE LACK OF TRANSPORTATION KEPT YOU FROM MEDICAL APPOINTMENTS OR FROM GETTING MEDICATIONS?: NO

## 2025-05-07 NOTE — PROGRESS NOTES
Luis E Del Valle is a 74 y.o.male who presents with   Chief Complaint   Patient presents with    Ear Fullness     States that his ears feel \"muffled\" for about 2 weeks    Congestion   .    History of Present Illness  The patient presents for evaluation of bradycardia, nasal drainage, fingertip numbness, and muscle spasms.    He reports a recent onset of clear nasal drainage, which started 2 days ago. He attributes this to allergies rather than a cold. There is no associated facial pain, fevers, or chills. He has not been using Flonase, despite having it at home. He also reports dry and itchy eyes following cataract surgery. Mild sneezing is present, but no severe allergy symptoms.    For the past 2 weeks, he has been experiencing muffled hearing, which predates his current congestion.    Intermittent numbness in his fingertips is described as a sensation of coldness that resolves spontaneously. There are no color changes in the fingertips or increased frequency of dropping objects. The numbness does not worsen with prolonged use of his hands. He does not engage in excessive typing or manual labor. He has a history of neck surgery and wonders if this could be related to his symptoms. There are no tingling sensations. Some relief from the numbness is achieved by sitting on his hands while watching television.    He is currently taking methocarbamol 3 times daily for muscle spasms, which he finds beneficial for his piriformis muscles and lower back pain.    His heart rate is usually in the mid-60s. He has a cardiologist but has not seen him in 2 years. He takes metoprolol every day at night. He had an EKG done in August 2024, which showed a low heart rate. There are no episodes of dizziness or feeling like he is going to faint.    He was referred to a urologist for his prostate, which was high, but it has since gone down. He will see him in a year.    PAST SURGICAL HISTORY:  Cataract surgery  Neck surgery    FAMILY

## 2025-05-08 RX ORDER — METHOCARBAMOL 750 MG/1
750 TABLET, FILM COATED ORAL 3 TIMES DAILY PRN
Qty: 30 TABLET | Refills: 0 | Status: SHIPPED | OUTPATIENT
Start: 2025-05-08 | End: 2025-05-18

## 2025-05-12 ENCOUNTER — TELEPHONE (OUTPATIENT)
Dept: ADMINISTRATIVE | Age: 75
End: 2025-05-12

## 2025-05-12 NOTE — TELEPHONE ENCOUNTER
Submitted PA for Methocarbamol 750MG tablets  Via [facility] Key: [id number] STATUS: PENDING.    Follow up done daily; if no decision with in three days we will refax.  If another three days goes by with no decision will call the insurance for status.

## 2025-05-13 NOTE — TELEPHONE ENCOUNTER
The medication Methocarbamol 750MG tablets is APPROVED from 04/28/25 until further notice.    Please notify the patient.    If this requires a response please respond to the pool ( P MHCX PSC MEDICATION PRE-AUTH).

## 2025-06-06 RX ORDER — SERTRALINE HYDROCHLORIDE 25 MG/1
25 TABLET, FILM COATED ORAL DAILY
Qty: 30 TABLET | Refills: 0 | Status: SHIPPED | OUTPATIENT
Start: 2025-06-06

## 2025-06-14 DIAGNOSIS — E78.00 PURE HYPERCHOLESTEROLEMIA: ICD-10-CM

## 2025-06-16 RX ORDER — ATORVASTATIN CALCIUM 40 MG/1
40 TABLET, FILM COATED ORAL DAILY
Qty: 90 TABLET | Refills: 1 | Status: SHIPPED | OUTPATIENT
Start: 2025-06-16

## 2025-07-07 RX ORDER — SERTRALINE HYDROCHLORIDE 25 MG/1
25 TABLET, FILM COATED ORAL DAILY
Qty: 30 TABLET | Refills: 0 | Status: SHIPPED | OUTPATIENT
Start: 2025-07-07

## 2025-07-14 ENCOUNTER — PROCEDURE VISIT (OUTPATIENT)
Dept: AUDIOLOGY | Age: 75
End: 2025-07-14
Payer: MEDICARE

## 2025-07-14 ENCOUNTER — OFFICE VISIT (OUTPATIENT)
Dept: ENT CLINIC | Age: 75
End: 2025-07-14
Payer: MEDICARE

## 2025-07-14 VITALS
DIASTOLIC BLOOD PRESSURE: 84 MMHG | WEIGHT: 189 LBS | SYSTOLIC BLOOD PRESSURE: 160 MMHG | HEART RATE: 60 BPM | BODY MASS INDEX: 27.51 KG/M2

## 2025-07-14 DIAGNOSIS — H93.13 TINNITUS OF BOTH EARS: ICD-10-CM

## 2025-07-14 DIAGNOSIS — H90.3 SENSORINEURAL HEARING LOSS, BILATERAL: Primary | ICD-10-CM

## 2025-07-14 DIAGNOSIS — H69.91 DYSFUNCTION OF RIGHT EUSTACHIAN TUBE: ICD-10-CM

## 2025-07-14 DIAGNOSIS — H65.91 FLUID LEVEL BEHIND TYMPANIC MEMBRANE OF RIGHT EAR: ICD-10-CM

## 2025-07-14 DIAGNOSIS — H90.3 SENSORINEURAL HEARING LOSS (SNHL) OF BOTH EARS: Primary | ICD-10-CM

## 2025-07-14 DIAGNOSIS — H93.8X1 SENSATION OF FULLNESS IN RIGHT EAR: ICD-10-CM

## 2025-07-14 PROCEDURE — 3077F SYST BP >= 140 MM HG: CPT | Performed by: OTOLARYNGOLOGY

## 2025-07-14 PROCEDURE — 1123F ACP DISCUSS/DSCN MKR DOCD: CPT | Performed by: OTOLARYNGOLOGY

## 2025-07-14 PROCEDURE — 92504 EAR MICROSCOPY EXAMINATION: CPT | Performed by: OTOLARYNGOLOGY

## 2025-07-14 PROCEDURE — G8417 CALC BMI ABV UP PARAM F/U: HCPCS | Performed by: OTOLARYNGOLOGY

## 2025-07-14 PROCEDURE — 92557 COMPREHENSIVE HEARING TEST: CPT

## 2025-07-14 PROCEDURE — G8427 DOCREV CUR MEDS BY ELIG CLIN: HCPCS | Performed by: OTOLARYNGOLOGY

## 2025-07-14 PROCEDURE — 99214 OFFICE O/P EST MOD 30 MIN: CPT | Performed by: OTOLARYNGOLOGY

## 2025-07-14 PROCEDURE — 1036F TOBACCO NON-USER: CPT | Performed by: OTOLARYNGOLOGY

## 2025-07-14 PROCEDURE — 92567 TYMPANOMETRY: CPT

## 2025-07-14 PROCEDURE — 1159F MED LIST DOCD IN RCRD: CPT | Performed by: OTOLARYNGOLOGY

## 2025-07-14 PROCEDURE — 3017F COLORECTAL CA SCREEN DOC REV: CPT | Performed by: OTOLARYNGOLOGY

## 2025-07-14 PROCEDURE — 3079F DIAST BP 80-89 MM HG: CPT | Performed by: OTOLARYNGOLOGY

## 2025-07-14 RX ORDER — PREDNISONE 10 MG/1
TABLET ORAL
Qty: 34 TABLET | Refills: 0 | Status: SHIPPED | OUTPATIENT
Start: 2025-07-14

## 2025-07-14 ASSESSMENT — ENCOUNTER SYMPTOMS
PHOTOPHOBIA: 0
DIARRHEA: 0
COLOR CHANGE: 0
COUGH: 0
SINUS PRESSURE: 0
EYE PAIN: 0
EYE REDNESS: 0
NAUSEA: 0
STRIDOR: 0
FACIAL SWELLING: 0
SORE THROAT: 0
TROUBLE SWALLOWING: 0
SHORTNESS OF BREATH: 0
RHINORRHEA: 0
EYE ITCHING: 0
CHOKING: 0
VOICE CHANGE: 0
SINUS PAIN: 0

## 2025-07-14 NOTE — PROGRESS NOTES
[facility]  [address] , [address]  [place] [address]  P: [phone]  F: [phone]       Patient     Luis E Del Valle  1950    ChiefComplaint     Chief Complaint   Patient presents with    Hearing Problem     Bilateral hearing loss, last hearing test done 7/14/25       History of Present Illness     Luis E Del Valle is a pleasant 74 y.o. male here for follow-up for bilateral hearing loss.  Audiogram performed in the office today reveals bilateral normal to moderate severe downward sloping sensorineural loss, type C tympanogram in the right, type a hypermobile on the left.  No interval change in audiogram compared to 2023.  Identical pure-tone line and tympanograms.  2 months ago, the patient was on a return flight and developed right-sided ear pressure and muffled hearing.  He started fluticasone after seeing an outside ENT.  He states the hearing has improved.  He denies any history of ear infections or ear surgeries or ear tubes.  No tinnitus or otorrhea.    Past Medical History     Past Medical History:   Diagnosis Date    Anal fissure 08/2022    Atrial fibrillation (HCC) 11/2021    AV block, 1st degree     CAD (coronary artery disease) 04/2011    Dr. Llanos    Calculus of kidney 12/01/2010    Chondromalacia of patella     Closed nondisplaced fracture of styloid process of left radius 10/2021    Congenital heart disease 2011    DDD (degenerative disc disease), lumbar     L2-3; L4-5    DDD (degenerative disc disease), lumbar 04/2019    L1-2; L2-3, L3-4; L4-5; L5-S1    Degeneration of cervical intervertebral disc      Degenerative arthritis of left foot 07/2016    Depression and anxiety      Diabetes mellitus, type 2 (HCC) 04/2019    diet controlled    ED (erectile dysfunction)      Esophageal reflux      Hypertension 2012    Lumbar stenosis 02/2020    L2-3; L3-4; L4-5    Nosebleed     Obstructive sleep apnea 2003    CPAP    Prediabetes 04/2015    Pure hypercholesterolemia

## 2025-07-14 NOTE — PATIENT INSTRUCTIONS
Afrin (oxymetazoline) nasal spray - 2 sprays each nostril one hour and thirty minutes prior to flight descent  Sudafed (pseudoephedrine) - take one tablet one hour prior to flight descent

## 2025-07-14 NOTE — PROGRESS NOTES
Luis E Del Valle   1950, 74 y.o. male   [id number]       Referring Provider: Adan Garcia DO  Referral Type: In an order in Epic    Reason for Visit: Evaluation of suspected change in hearing, tinnitus, or balance.    ADULT AUDIOLOGIC EVALUATION      Luis E Del Valle is a 74 y.o. male seen today, 7/14/2025 , for an initial audiologic evaluation.  Patient was seen by Adan Garcia DO following today's evaluation.    AUDIOLOGIC AND OTHER PERTINENT MEDICAL HISTORY:      Luis E Del Valle reports having right ear fluid for the last 8 weeks. He had a sinus infection prior and was also flying at the time of onset. He notes hearing has been muffled since. Patient saw an ENT prior to today who noted fluid on exam. He admits to bilateral constant tinnitus that is not bothersome and brief dizziness but attributes this to heart issues.     He denied otalgia, otorrhea, history of noise exposure, history of head trauma, history of ear surgery, and family history of hearing loss    Date: 7/14/2025     IMPRESSIONS:      Today's results revealed symmetric sensorineural hearing loss with Excellent speech understanding when in quiet, bilaterally. Tympanometry indicates significant negative middle ear pressure in the right ear indicating abnormal function. Discussed test results and implications with patient. Discussed scheduling a HAE. Discussed use of tinnitus management strategies. Hearing aids recommended at this time.  Follow medical recommendations of Adan Garcia DO.     ASSESSMENT AND FINDINGS:     Otoscopy unremarkable.    RIGHT EAR:  Hearing Sensitivity:Normal sloping to moderately severe sensorineural hearing loss   Speech Recognition Threshold: 15 dB HL  Word Recognition: Excellent 100%, based on NU-6 25-word list at 15 dBHL using recorded speech stimuli.    Tympanometry: Negative peak pressure with normal compliance, Type C tympanogram, consistent with ETD/history of otitis media.       LEFT EAR:  Hearing

## 2025-07-25 DIAGNOSIS — I10 ESSENTIAL HYPERTENSION: ICD-10-CM

## 2025-07-25 RX ORDER — LISINOPRIL 10 MG/1
10 TABLET ORAL DAILY
Qty: 90 TABLET | Refills: 0 | Status: SHIPPED | OUTPATIENT
Start: 2025-07-25

## 2025-08-08 RX ORDER — SERTRALINE HYDROCHLORIDE 25 MG/1
25 TABLET, FILM COATED ORAL DAILY
Qty: 30 TABLET | Refills: 0 | Status: SHIPPED | OUTPATIENT
Start: 2025-08-08

## 2025-08-20 ENCOUNTER — OFFICE VISIT (OUTPATIENT)
Dept: ORTHOPEDIC SURGERY | Age: 75
End: 2025-08-20

## 2025-08-20 VITALS — WEIGHT: 189 LBS | BODY MASS INDEX: 27.06 KG/M2 | HEIGHT: 70 IN

## 2025-08-20 DIAGNOSIS — M17.12 PRIMARY OSTEOARTHRITIS OF LEFT KNEE: Primary | ICD-10-CM

## 2025-08-20 DIAGNOSIS — I25.10 CORONARY ARTERY DISEASE INVOLVING NATIVE CORONARY ARTERY OF NATIVE HEART WITHOUT ANGINA PECTORIS: ICD-10-CM

## 2025-08-20 DIAGNOSIS — S83.242A TEAR OF MEDIAL MENISCUS OF LEFT KNEE, UNSPECIFIED TEAR TYPE, UNSPECIFIED WHETHER OLD OR CURRENT TEAR, INITIAL ENCOUNTER: ICD-10-CM

## 2025-08-20 DIAGNOSIS — M25.562 LEFT KNEE PAIN, UNSPECIFIED CHRONICITY: ICD-10-CM

## 2025-08-20 RX ORDER — ROPIVACAINE HYDROCHLORIDE 5 MG/ML
30 INJECTION, SOLUTION EPIDURAL; INFILTRATION; PERINEURAL ONCE
Status: COMPLETED | OUTPATIENT
Start: 2025-08-20 | End: 2025-08-20

## 2025-08-20 RX ORDER — TRIAMCINOLONE ACETONIDE 40 MG/ML
40 INJECTION, SUSPENSION INTRA-ARTICULAR; INTRAMUSCULAR ONCE
Status: COMPLETED | OUTPATIENT
Start: 2025-08-20 | End: 2025-08-20

## 2025-08-20 RX ADMIN — TRIAMCINOLONE ACETONIDE 40 MG: 40 INJECTION, SUSPENSION INTRA-ARTICULAR; INTRAMUSCULAR at 15:28

## 2025-08-20 RX ADMIN — ROPIVACAINE HYDROCHLORIDE 30 ML: 5 INJECTION, SOLUTION EPIDURAL; INFILTRATION; PERINEURAL at 15:28

## (undated) DEVICE — STERILE POLYISOPRENE POWDER-FREE SURGICAL GLOVES: Brand: PROTEXIS

## (undated) DEVICE — PEN: MARKING STD 100/CS: Brand: MEDICAL ACTION INDUSTRIES

## (undated) DEVICE — SNARES COLD OVAL 10MM THIN

## (undated) DEVICE — CANNULA SAMP CO2 AD GRN 7FT CO2 AND 7FT O2 TBNG UNIV CONN

## (undated) DEVICE — SET EXTN L7IN PRIMING VOL 0.5ML PRSS RATE STD BOR 1 REM

## (undated) DEVICE — STANDARD HYPODERMIC NEEDLE,POLYPROPYLENE HUB: Brand: MONOJECT

## (undated) DEVICE — NEEDLE SPNL 22GA L3.5IN BLK HUB S STL REG WALL FIT STYL W/

## (undated) DEVICE — NEEDLE EPI 22GA L2IN CLR HUB N WNG PERIFIX TUOHY

## (undated) DEVICE — Device: Brand: JELCO

## (undated) DEVICE — SYRINGE MED 3ML CLR PLAS STD N CTRL LUERLOCK TIP DISP

## (undated) DEVICE — CHLORAPREP 26ML ORANGE

## (undated) DEVICE — MEDIA CONTRAST RX ISOVUE-300 61% 30ML VIALS

## (undated) DEVICE — UNIVERSAL BLOCK TRAY: Brand: AVANOS*

## (undated) DEVICE — DISPOSABLE OR TOWEL: Brand: CARDINAL HEALTH

## (undated) DEVICE — TRAP SPEC RETRV CLR PLAS POLYP IN LN SUCT QUIK CTCH